# Patient Record
Sex: FEMALE | Race: WHITE | Employment: PART TIME
[De-identification: names, ages, dates, MRNs, and addresses within clinical notes are randomized per-mention and may not be internally consistent; named-entity substitution may affect disease eponyms.]

---

## 2017-01-03 ENCOUNTER — OFFICE VISIT (OUTPATIENT)
Dept: FAMILY MEDICINE CLINIC | Facility: CLINIC | Age: 32
End: 2017-01-03

## 2017-01-03 VITALS
HEIGHT: 64 IN | SYSTOLIC BLOOD PRESSURE: 126 MMHG | DIASTOLIC BLOOD PRESSURE: 68 MMHG | WEIGHT: 212 LBS | BODY MASS INDEX: 36.19 KG/M2 | HEART RATE: 114 BPM | TEMPERATURE: 97.9 F

## 2017-01-03 DIAGNOSIS — E11.42 DIABETIC POLYNEUROPATHY ASSOCIATED WITH TYPE 2 DIABETES MELLITUS (HCC): Primary | ICD-10-CM

## 2017-01-03 DIAGNOSIS — E78.5 HYPERLIPIDEMIA, UNSPECIFIED HYPERLIPIDEMIA TYPE: ICD-10-CM

## 2017-01-03 DIAGNOSIS — I10 ESSENTIAL HYPERTENSION: ICD-10-CM

## 2017-01-03 LAB
GLUCOSE BLD-MCNC: 167 MG/DL
HBA1C MFR BLD: 6.7 %

## 2017-01-03 PROCEDURE — 82962 GLUCOSE BLOOD TEST: CPT | Performed by: FAMILY MEDICINE

## 2017-01-03 PROCEDURE — 83036 HEMOGLOBIN GLYCOSYLATED A1C: CPT | Performed by: FAMILY MEDICINE

## 2017-01-03 PROCEDURE — 99214 OFFICE O/P EST MOD 30 MIN: CPT | Performed by: FAMILY MEDICINE

## 2017-01-03 ASSESSMENT — ENCOUNTER SYMPTOMS
ABDOMINAL PAIN: 0
SHORTNESS OF BREATH: 0
NAUSEA: 0
SORE THROAT: 0

## 2017-01-09 RX ORDER — INSULIN GLARGINE 100 [IU]/ML
INJECTION, SOLUTION SUBCUTANEOUS
Qty: 5 PEN | Refills: 1 | Status: SHIPPED | OUTPATIENT
Start: 2017-01-09 | End: 2017-02-06 | Stop reason: SDUPTHER

## 2017-01-09 RX ORDER — GLIMEPIRIDE 4 MG/1
4 TABLET ORAL 2 TIMES DAILY
Qty: 60 TABLET | Refills: 2 | Status: SHIPPED | OUTPATIENT
Start: 2017-01-09 | End: 2020-02-26 | Stop reason: SINTOL

## 2017-01-24 RX ORDER — CITALOPRAM 40 MG/1
40 TABLET ORAL DAILY
Qty: 30 TABLET | Refills: 2 | Status: SHIPPED | OUTPATIENT
Start: 2017-01-24 | End: 2020-02-26 | Stop reason: SDUPTHER

## 2017-02-03 ENCOUNTER — OFFICE VISIT (OUTPATIENT)
Dept: FAMILY MEDICINE CLINIC | Facility: CLINIC | Age: 32
End: 2017-02-03

## 2017-02-03 VITALS
BODY MASS INDEX: 36.02 KG/M2 | DIASTOLIC BLOOD PRESSURE: 60 MMHG | HEIGHT: 64 IN | WEIGHT: 211 LBS | SYSTOLIC BLOOD PRESSURE: 134 MMHG | TEMPERATURE: 98.2 F | HEART RATE: 110 BPM

## 2017-02-03 DIAGNOSIS — N39.0 RECURRENT UTI: Primary | ICD-10-CM

## 2017-02-03 DIAGNOSIS — I10 ESSENTIAL HYPERTENSION: ICD-10-CM

## 2017-02-03 DIAGNOSIS — E11.42 DIABETIC POLYNEUROPATHY ASSOCIATED WITH TYPE 2 DIABETES MELLITUS (HCC): ICD-10-CM

## 2017-02-03 LAB
BILIRUBIN, POC: ABNORMAL
BLOOD URINE, POC: ABNORMAL
CLARITY, POC: ABNORMAL
COLOR, POC: YELLOW
GLUCOSE URINE, POC: ABNORMAL
KETONES, POC: ABNORMAL
LEUKOCYTE EST, POC: ABNORMAL
NITRITE, POC: ABNORMAL
PH, POC: 5
PROTEIN, POC: ABNORMAL
SPECIFIC GRAVITY, POC: 1.03
UROBILINOGEN, POC: ABNORMAL

## 2017-02-03 PROCEDURE — 81002 URINALYSIS NONAUTO W/O SCOPE: CPT | Performed by: FAMILY MEDICINE

## 2017-02-03 PROCEDURE — 99213 OFFICE O/P EST LOW 20 MIN: CPT | Performed by: FAMILY MEDICINE

## 2017-02-03 RX ORDER — GLIMEPIRIDE 4 MG/1
4 TABLET ORAL 2 TIMES DAILY
Qty: 60 TABLET | Refills: 2 | Status: CANCELLED | OUTPATIENT
Start: 2017-02-03

## 2017-02-03 RX ORDER — CITALOPRAM 40 MG/1
40 TABLET ORAL DAILY
Qty: 30 TABLET | Refills: 2 | Status: CANCELLED | OUTPATIENT
Start: 2017-02-03

## 2017-02-03 RX ORDER — CIPROFLOXACIN 500 MG/1
500 TABLET, FILM COATED ORAL 2 TIMES DAILY
Qty: 14 TABLET | Refills: 0 | Status: SHIPPED | OUTPATIENT
Start: 2017-02-03 | End: 2017-02-10

## 2017-02-03 RX ORDER — LISINOPRIL 10 MG/1
10 TABLET ORAL DAILY
Qty: 30 TABLET | Refills: 2 | Status: CANCELLED | OUTPATIENT
Start: 2017-02-03

## 2017-02-03 RX ORDER — PREGABALIN 50 MG/1
50 CAPSULE ORAL DAILY
Qty: 30 CAPSULE | Refills: 1 | Status: CANCELLED | OUTPATIENT
Start: 2017-02-03

## 2017-02-03 RX ORDER — INSULIN GLARGINE 100 [IU]/ML
INJECTION, SOLUTION SUBCUTANEOUS
Qty: 5 PEN | Refills: 1 | Status: CANCELLED | OUTPATIENT
Start: 2017-02-03

## 2017-02-03 RX ORDER — PHENAZOPYRIDINE HYDROCHLORIDE 200 MG/1
200 TABLET, FILM COATED ORAL 3 TIMES DAILY
Qty: 9 TABLET | Refills: 0 | Status: SHIPPED | OUTPATIENT
Start: 2017-02-03 | End: 2017-02-06

## 2017-02-03 RX ORDER — LANCETS 30 GAUGE
EACH MISCELLANEOUS
Qty: 100 EACH | Refills: 2 | Status: CANCELLED | OUTPATIENT
Start: 2017-02-03

## 2017-02-03 RX ORDER — SIMVASTATIN 20 MG
20 TABLET ORAL EVERY EVENING
Qty: 30 TABLET | Refills: 2 | Status: CANCELLED | OUTPATIENT
Start: 2017-02-03

## 2017-02-03 ASSESSMENT — ENCOUNTER SYMPTOMS
SORE THROAT: 0
ABDOMINAL PAIN: 0
BACK PAIN: 1
SHORTNESS OF BREATH: 0
NAUSEA: 0

## 2017-02-03 ASSESSMENT — PATIENT HEALTH QUESTIONNAIRE - PHQ9
SUM OF ALL RESPONSES TO PHQ QUESTIONS 1-9: 0
2. FEELING DOWN, DEPRESSED OR HOPELESS: 0
SUM OF ALL RESPONSES TO PHQ9 QUESTIONS 1 & 2: 0
1. LITTLE INTEREST OR PLEASURE IN DOING THINGS: 0

## 2017-02-14 DIAGNOSIS — I10 ESSENTIAL HYPERTENSION: ICD-10-CM

## 2017-02-14 DIAGNOSIS — E11.42 DIABETIC POLYNEUROPATHY ASSOCIATED WITH TYPE 2 DIABETES MELLITUS (HCC): ICD-10-CM

## 2017-02-14 RX ORDER — SIMVASTATIN 20 MG
20 TABLET ORAL EVERY EVENING
Qty: 30 TABLET | Refills: 2 | Status: SHIPPED | OUTPATIENT
Start: 2017-02-14 | End: 2020-02-26 | Stop reason: ALTCHOICE

## 2017-02-14 RX ORDER — LISINOPRIL 10 MG/1
10 TABLET ORAL DAILY
Qty: 30 TABLET | Refills: 2 | Status: SHIPPED | OUTPATIENT
Start: 2017-02-14 | End: 2020-02-26

## 2017-02-24 RX ORDER — GLIMEPIRIDE 4 MG/1
4 TABLET ORAL 2 TIMES DAILY
Qty: 60 TABLET | Refills: 2 | Status: CANCELLED | OUTPATIENT
Start: 2017-02-24

## 2017-02-24 RX ORDER — PREGABALIN 50 MG/1
50 CAPSULE ORAL DAILY
Qty: 30 CAPSULE | Refills: 1 | OUTPATIENT
Start: 2017-02-24 | End: 2020-02-26 | Stop reason: ALTCHOICE

## 2017-03-02 ENCOUNTER — OFFICE VISIT (OUTPATIENT)
Dept: FAMILY MEDICINE CLINIC | Facility: CLINIC | Age: 32
End: 2017-03-02

## 2017-03-02 VITALS
TEMPERATURE: 97.9 F | HEART RATE: 98 BPM | BODY MASS INDEX: 35.34 KG/M2 | WEIGHT: 207 LBS | SYSTOLIC BLOOD PRESSURE: 126 MMHG | HEIGHT: 64 IN | DIASTOLIC BLOOD PRESSURE: 62 MMHG

## 2017-03-02 DIAGNOSIS — J32.1 FRONTAL SINUSITIS, UNSPECIFIED CHRONICITY: Primary | ICD-10-CM

## 2017-03-02 DIAGNOSIS — E11.42 DIABETIC POLYNEUROPATHY ASSOCIATED WITH TYPE 2 DIABETES MELLITUS (HCC): ICD-10-CM

## 2017-03-02 PROCEDURE — 99213 OFFICE O/P EST LOW 20 MIN: CPT | Performed by: FAMILY MEDICINE

## 2017-03-02 RX ORDER — SULFAMETHOXAZOLE AND TRIMETHOPRIM 800; 160 MG/1; MG/1
1 TABLET ORAL 2 TIMES DAILY
Qty: 20 TABLET | Refills: 0 | Status: SHIPPED | OUTPATIENT
Start: 2017-03-02 | End: 2017-03-12

## 2017-03-02 RX ORDER — FLUTICASONE PROPIONATE 50 MCG
2 SPRAY, SUSPENSION (ML) NASAL DAILY
Qty: 1 BOTTLE | Refills: 0 | Status: SHIPPED | OUTPATIENT
Start: 2017-03-02 | End: 2021-12-28

## 2017-03-02 ASSESSMENT — ENCOUNTER SYMPTOMS
SINUS PRESSURE: 1
COUGH: 1
SHORTNESS OF BREATH: 0
NAUSEA: 0
ABDOMINAL PAIN: 0
SORE THROAT: 0
WHEEZING: 1

## 2017-05-08 ENCOUNTER — TELEPHONE (OUTPATIENT)
Dept: FAMILY MEDICINE CLINIC | Age: 32
End: 2017-05-08

## 2018-09-23 ENCOUNTER — HOSPITAL ENCOUNTER (EMERGENCY)
Age: 33
Discharge: HOME | End: 2018-09-23
Attending: EMERGENCY MEDICINE

## 2018-09-23 VITALS
BODY MASS INDEX: 35.44 KG/M2 | TEMPERATURE: 97.5 F | WEIGHT: 200 LBS | HEART RATE: 100 BPM | OXYGEN SATURATION: 99 % | HEIGHT: 63 IN | DIASTOLIC BLOOD PRESSURE: 79 MMHG | SYSTOLIC BLOOD PRESSURE: 143 MMHG | RESPIRATION RATE: 18 BRPM

## 2018-09-23 DIAGNOSIS — K05.10 GINGIVITIS: ICD-10-CM

## 2018-09-23 DIAGNOSIS — K02.9 DENTAL DECAY: Primary | ICD-10-CM

## 2018-09-23 PROCEDURE — 99282 EMERGENCY DEPT VISIT SF MDM: CPT

## 2018-09-23 RX ORDER — CLINDAMYCIN HYDROCHLORIDE 150 MG/1
300 CAPSULE ORAL 4 TIMES DAILY
Qty: 56 CAPSULE | Refills: 0 | Status: SHIPPED | OUTPATIENT
Start: 2018-09-23 | End: 2018-09-30

## 2018-09-23 RX ORDER — CHLORHEXIDINE GLUCONATE 0.12 MG/ML
15 RINSE ORAL 2 TIMES DAILY
Qty: 420 ML | Refills: 0 | Status: SHIPPED | OUTPATIENT
Start: 2018-09-23 | End: 2018-10-07

## 2018-09-23 ASSESSMENT — PAIN DESCRIPTION - PAIN TYPE: TYPE: ACUTE PAIN

## 2018-09-23 ASSESSMENT — PAIN DESCRIPTION - LOCATION: LOCATION: TEETH

## 2018-09-23 ASSESSMENT — PAIN SCALES - GENERAL: PAINLEVEL_OUTOF10: 8

## 2018-09-23 NOTE — ED NOTES
C/o pain left lower jaw; left lower jacob + decay redness at gums slight swelling      Lorraine Ibrahim RN  09/23/18 [id number]

## 2018-09-24 NOTE — ED PROVIDER NOTES
[address] ED  eMERGENCY dEPARTMENT eNCOUnter   Independent Attestation     Pt Name: Binh Miranda  MRN: [id number]  [place] 1985  Date of evaluation: 9/23/18   Binh Miranda is a 26 y.o. female who presents with Dental Pain    Vitals:   Vitals:    09/23/18 0950   BP: (!) 143/79   Pulse: 100   Resp: 18   Temp: 97.5 °F (36.4 °C)   TempSrc: Oral   SpO2: 99%   Weight: 200 lb (90.7 kg)   Height: 5' 3\" (1.6 m)     Impression:   1. Dental decay    2. Gingivitis      I was personally available for consultation in the Emergency Department. I have reviewed the chart and agree with the documentation as recorded by the [facility], including the assessment, treatment plan and disposition.   Steve Lomax MD  Attending Emergency  Physician                  Steve Loamx MD  09/23/18 2012       Steve Lomax MD  10/20/18 [id number]

## 2019-07-26 ENCOUNTER — TELEPHONE (OUTPATIENT)
Dept: FAMILY MEDICINE CLINIC | Age: 34
End: 2019-07-26

## 2019-08-28 ENCOUNTER — TELEPHONE (OUTPATIENT)
Dept: FAMILY MEDICINE CLINIC | Age: 34
End: 2019-08-28

## 2019-09-02 ENCOUNTER — HOSPITAL ENCOUNTER (EMERGENCY)
Age: 34
Discharge: HOME | End: 2019-09-02
Attending: EMERGENCY MEDICINE

## 2019-09-02 VITALS
DIASTOLIC BLOOD PRESSURE: 89 MMHG | RESPIRATION RATE: 16 BRPM | SYSTOLIC BLOOD PRESSURE: 153 MMHG | TEMPERATURE: 98 F | HEART RATE: 94 BPM | OXYGEN SATURATION: 97 % | HEIGHT: 63 IN | BODY MASS INDEX: 35.44 KG/M2 | WEIGHT: 200 LBS

## 2019-09-02 DIAGNOSIS — R22.0 SWELLING OF RIGHT SIDE OF FACE: ICD-10-CM

## 2019-09-02 DIAGNOSIS — K08.89 PAIN, DENTAL: Primary | ICD-10-CM

## 2019-09-02 PROCEDURE — 99282 EMERGENCY DEPT VISIT SF MDM: CPT

## 2019-09-02 PROCEDURE — 6370000000 HC RX 637 (ALT 250 FOR IP): Performed by: PHYSICIAN ASSISTANT

## 2019-09-02 RX ORDER — CLINDAMYCIN HYDROCHLORIDE 150 MG/1
300 CAPSULE ORAL ONCE
Status: COMPLETED | OUTPATIENT
Start: 2019-09-02 | End: 2019-09-02

## 2019-09-02 RX ORDER — CLINDAMYCIN HYDROCHLORIDE 300 MG/1
300 CAPSULE ORAL 3 TIMES DAILY
Qty: 30 CAPSULE | Refills: 0 | Status: SHIPPED | OUTPATIENT
Start: 2019-09-02 | End: 2019-09-12

## 2019-09-02 RX ORDER — IBUPROFEN 800 MG/1
800 TABLET ORAL EVERY 8 HOURS PRN
Qty: 20 TABLET | Refills: 0 | Status: SHIPPED | OUTPATIENT
Start: 2019-09-02

## 2019-09-02 RX ORDER — IBUPROFEN 800 MG/1
800 TABLET ORAL ONCE
Status: COMPLETED | OUTPATIENT
Start: 2019-09-02 | End: 2019-09-02

## 2019-09-02 RX ADMIN — IBUPROFEN 800 MG: 800 TABLET ORAL at 21:01

## 2019-09-02 RX ADMIN — CLINDAMYCIN HYDROCHLORIDE 300 MG: 150 CAPSULE ORAL at 21:01

## 2019-09-02 ASSESSMENT — PAIN SCALES - GENERAL: PAINLEVEL_OUTOF10: 5

## 2019-09-03 NOTE — ED PROVIDER NOTES
eMERGENCY dEPARTMENT eNCOUnter   Independent Attestation     Pt Name: Harpal Covington  MRN: [id number]  [place] 1985  Date of evaluation: 9/3/19     Harpal Covington is a 37 y.o. female with CC: Dental Pain      Based on the medical record the care appears appropriate. I was personally available for consultation in the Emergency Department.     Jackie Tapia MD  Attending Emergency Physician                    Agustin Moore MD  09/03/19 [id number]       Agustin Moore MD  09/03/19 [id number]
Take 1 capsule by mouth 3 times daily for 10 days    IBUPROFEN (ADVIL;MOTRIN) 800 MG TABLET    Take 1 tablet by mouth every 8 hours as needed for Pain       (Please note that portions of this note were completed with a voice recognition program.  Efforts were made to edit the dictations but occasionally words are mis-transcribed.)    Julia Morris 124, PA-C  09/02/19 [id number]

## 2019-10-23 ENCOUNTER — TELEPHONE (OUTPATIENT)
Dept: FAMILY MEDICINE CLINIC | Age: 34
End: 2019-10-23

## 2019-11-08 ENCOUNTER — TELEPHONE (OUTPATIENT)
Dept: FAMILY MEDICINE CLINIC | Age: 34
End: 2019-11-08

## 2020-02-26 ENCOUNTER — OFFICE VISIT (OUTPATIENT)
Dept: FAMILY MEDICINE CLINIC | Age: 35
End: 2020-02-26
Payer: COMMERCIAL

## 2020-02-26 VITALS
HEART RATE: 93 BPM | BODY MASS INDEX: 33.73 KG/M2 | WEIGHT: 190.4 LBS | DIASTOLIC BLOOD PRESSURE: 84 MMHG | OXYGEN SATURATION: 98 % | SYSTOLIC BLOOD PRESSURE: 132 MMHG | HEIGHT: 63 IN

## 2020-02-26 PROBLEM — M25.472 LEFT ANKLE SWELLING: Status: ACTIVE | Noted: 2020-02-26

## 2020-02-26 LAB — HBA1C MFR BLD: 12.1 %

## 2020-02-26 PROCEDURE — 4004F PT TOBACCO SCREEN RCVD TLK: CPT | Performed by: FAMILY MEDICINE

## 2020-02-26 PROCEDURE — 99204 OFFICE O/P NEW MOD 45 MIN: CPT | Performed by: FAMILY MEDICINE

## 2020-02-26 PROCEDURE — G8427 DOCREV CUR MEDS BY ELIG CLIN: HCPCS | Performed by: FAMILY MEDICINE

## 2020-02-26 PROCEDURE — G8417 CALC BMI ABV UP PARAM F/U: HCPCS | Performed by: FAMILY MEDICINE

## 2020-02-26 PROCEDURE — 2022F DILAT RTA XM EVC RTNOPTHY: CPT | Performed by: FAMILY MEDICINE

## 2020-02-26 PROCEDURE — G8484 FLU IMMUNIZE NO ADMIN: HCPCS | Performed by: FAMILY MEDICINE

## 2020-02-26 PROCEDURE — 3046F HEMOGLOBIN A1C LEVEL >9.0%: CPT | Performed by: FAMILY MEDICINE

## 2020-02-26 PROCEDURE — 83036 HEMOGLOBIN GLYCOSYLATED A1C: CPT | Performed by: FAMILY MEDICINE

## 2020-02-26 RX ORDER — ATORVASTATIN CALCIUM 40 MG/1
40 TABLET, FILM COATED ORAL DAILY
Qty: 90 TABLET | Refills: 3 | Status: SHIPPED | OUTPATIENT
Start: 2020-02-26 | End: 2020-06-01 | Stop reason: SDUPTHER

## 2020-02-26 RX ORDER — LISINOPRIL 5 MG/1
5 TABLET ORAL DAILY
Qty: 90 TABLET | Refills: 1 | Status: SHIPPED | OUTPATIENT
Start: 2020-02-26 | End: 2020-09-16

## 2020-02-26 RX ORDER — CITALOPRAM 40 MG/1
40 TABLET ORAL DAILY
Qty: 30 TABLET | Refills: 2 | Status: SHIPPED | OUTPATIENT
Start: 2020-02-26 | End: 2020-04-22 | Stop reason: SDUPTHER

## 2020-02-26 RX ORDER — ASPIRIN 81 MG/1
81 TABLET ORAL DAILY
Qty: 90 TABLET | Refills: 1 | Status: SHIPPED | OUTPATIENT
Start: 2020-02-26 | End: 2020-09-16 | Stop reason: SDUPTHER

## 2020-02-26 SDOH — ECONOMIC STABILITY: FOOD INSECURITY: WITHIN THE PAST 12 MONTHS, YOU WORRIED THAT YOUR FOOD WOULD RUN OUT BEFORE YOU GOT MONEY TO BUY MORE.: NEVER TRUE

## 2020-02-26 SDOH — ECONOMIC STABILITY: TRANSPORTATION INSECURITY
IN THE PAST 12 MONTHS, HAS LACK OF TRANSPORTATION KEPT YOU FROM MEETINGS, WORK, OR FROM GETTING THINGS NEEDED FOR DAILY LIVING?: NO

## 2020-02-26 SDOH — ECONOMIC STABILITY: FOOD INSECURITY: WITHIN THE PAST 12 MONTHS, THE FOOD YOU BOUGHT JUST DIDN'T LAST AND YOU DIDN'T HAVE MONEY TO GET MORE.: NEVER TRUE

## 2020-02-26 SDOH — ECONOMIC STABILITY: INCOME INSECURITY: HOW HARD IS IT FOR YOU TO PAY FOR THE VERY BASICS LIKE FOOD, HOUSING, MEDICAL CARE, AND HEATING?: NOT HARD AT ALL

## 2020-02-26 SDOH — ECONOMIC STABILITY: TRANSPORTATION INSECURITY
IN THE PAST 12 MONTHS, HAS THE LACK OF TRANSPORTATION KEPT YOU FROM MEDICAL APPOINTMENTS OR FROM GETTING MEDICATIONS?: NO

## 2020-02-26 ASSESSMENT — ENCOUNTER SYMPTOMS
BLOOD IN STOOL: 0
SHORTNESS OF BREATH: 0
SINUS PAIN: 0
COUGH: 0
PHOTOPHOBIA: 0
CHEST TIGHTNESS: 0
ABDOMINAL DISTENTION: 0
RHINORRHEA: 0
SORE THROAT: 0
ABDOMINAL PAIN: 0
CONSTIPATION: 0
BACK PAIN: 0
VOMITING: 0
NAUSEA: 0
WHEEZING: 0
COLOR CHANGE: 1

## 2020-02-26 NOTE — PROGRESS NOTES
Visit Information    Have you changed or started any medications since your last visit including any over-the-counter medicines, vitamins, or herbal medicines? no   Are you having any side effects from any of your medications? -  no  Have you stopped taking any of your medications? Is so, why? -  no    Have you seen any other physician or provider since your last visit? No  Have you had any other diagnostic tests since your last visit? No  Have you been seen in the emergency room and/or had an admission to a hospital since we last saw you? Yes - Records Obtained  Have you had your routine dental cleaning in the past 6 months? no    Have you activated your [employer] account? If not, what are your barriers?  No:      Patient Care Team:  Kayla Fortune MD as PCP - General (Family Medicine)  Ryann De La O MD as PCP - [facility] Provider    Medical History Review  Past Medical, Family, and Social History reviewed and does contribute to the patient presenting condition    Health Maintenance   Topic Date Due    Varicella vaccine (1 of 2 - 2-dose childhood series) 02/07/1984    HIV screen  02/07/1998    Hepatitis B vaccine (1 of 3 - Risk 3-dose series) 02/07/2002    Diabetic retinal exam  06/10/2016    Lipid screen  04/05/2017    Potassium monitoring  04/05/2017    Creatinine monitoring  04/05/2017    Diabetic foot exam  07/05/2017    Diabetic microalbuminuria test  07/07/2017    A1C test (Diabetic or Prediabetic)  01/03/2018    Cervical cancer screen  06/16/2018    Flu vaccine (1) 09/01/2019    Pneumococcal 0-64 years Vaccine (1 of 1 - PPSV23) 02/04/2021 (Originally 2/7/1989)    DTaP/Tdap/Td vaccine (1 - Tdap) 02/26/2021 (Originally 2/7/1994)    Shingles Vaccine (1 of 2) 02/07/2033    Hepatitis A vaccine  Aged Out    Hib vaccine  Aged Out    Meningococcal (ACWY) vaccine  Aged Out

## 2020-02-26 NOTE — PROGRESS NOTES
Chief Complaint   Patient presents with    Established New Doctor    Hypertension    Hyperlipidemia    Diabetes         Salinas Blackwell  here today for follow up on chronic medical problems, go over labs and/or diagnostic studies, and medication refills. Established New Doctor; Hypertension; Hyperlipidemia; and Diabetes      HPI: Patient is here to  establish, has history of uncontrolled diabetes, was not on any medications since past 2 years. Patient reports she did not have any insurance and recently she went to ER with history of ankle injury and also she was not feeling well. Patient's blood sugars were running high more than 400. She was in [facility], was not in DKA. Patient was sent home with Lantus and Humalog. Patient reports her sugars were running high more than 300 and after she was taking Humalog before meals they were bumping to more than 400. Morning and evening sugars have slightly improved but are still more than 300. She increase her Lantus to 40 units twice a day by herself. Patient stopped Humalog. She has tried metformin in the past but she could not tolerate that. Hypertension fairly controlled on small dose of lisinopril. Hyperlipidemia no recent blood work. Patient reports she had a fall and twisted her left ankle, which is swollen, she had x-ray done in the ER that showed soft tissue swelling no fractures. Patient reports she still has swelling and pain but she is able to put weight on that. Swelling has mildly improved and bruise has also improved. She had mild range of motion. Patient reports she cannot by lancets and test strips, she is using her father's as he is getting free from [address] [facility]. She has history of depression was taking Celexa reports that was helping. She needs to restart on Celexa denies any bad thoughts, denies any suicide attempts.     /84   Pulse 93   Ht 5' 3\" (1.6 m)   Wt 190 lb 6.4 oz (86.4 kg)   LMP 02/07/2020 (Exact Date)   SpO2 98% Comment: resting @ RA  BMI 33.73 kg/m²    Body mass index is 33.73 kg/m². Wt Readings from Last 3 Encounters:   02/26/20 190 lb 6.4 oz (86.4 kg)   09/02/19 200 lb (90.7 kg)   09/23/18 200 lb (90.7 kg)        []Negative depression screening. PHQ Scores 2/3/2017   PHQ2 Score 0   PHQ9 Score 0      []1-4 = Minimal depression   []5-9 = Milddepression   []10-14 = Moderate depression   []15-19 = Moderately severe depression   []20-27 = Severe depression    Discussed testing with the patient and all questions fully answered. Hospital Outpatient Visit on 02/03/2017   Component Date Value Ref Range Status    Color, UA 02/03/2017 YELLOW  YEL Final    Turbidity UA 02/03/2017 TURBID* CLEAR Final    Glucose, Ur 02/03/2017 NEGATIVE  NEG Final    Bilirubin Urine 02/03/2017 NEGATIVE  NEG Final    Ketones, Urine 02/03/2017 TRACE* NEG Final    Specific Gravity, UA 02/03/2017 1.026  1.005 - 1.030 Final    Urine Hgb 02/03/2017 MODERATE* NEG Final    pH, UA 02/03/2017 5.0  5.0 - 8.0 Final    Protein, UA 02/03/2017 NEGATIVE  NEG Final    Urobilinogen, Urine 02/03/2017 Normal  NORM Final    Nitrite, Urine 02/03/2017 NEGATIVE  NEG Final    Leukocyte Esterase, Urine 02/03/2017 MODERATE* NEG Final    Urinalysis Comments 02/03/2017 Microscopic exam not performed based on chemical results unless requested in   Final    Comment:  original order.   [address] [place] [address] [address] [address] [address] [phone]           Most recent labs reviewed:     Lab Results   Component Value Date    WBC 6.9 10/28/2012    HGB 14.7 10/28/2012    HCT 43.1 10/28/2012    MCV 95.8 10/28/2012     (L) 10/28/2012       @BRIEFLAB(NA,K,CL,CO2,BUN,CREATININE,GLUCOSE,CALCIUM)@     Lab Results   Component Value Date    ALT 61 04/05/2016    AST 48 04/05/2016    ALKPHOS 121 04/05/2016    BILITOT 0.5 04/05/2016       No results found for: TSHFT4, TSH    Lab Results   Component Value Date    CHOL 150 04/05/2016    CHOL 231 06/04/2015     Lab Results   Component Value Date    TRIG 228 04/05/2016    TRIG 271 06/04/2015     Lab Results   Component Value Date    HDL 26 (A) 04/05/2016    HDL 27 (A) 06/04/2015     Lab Results   Component Value Date    LDLCALC 78 04/05/2016    LDLCALC 150 06/04/2015     Lab Results   Component Value Date    VLDL 46 04/05/2016    VLDL 54 06/04/2015     Lab Results   Component Value Date    CHOLHDLRATIO 5.8 04/05/2016    CHOLHDLRATIO 8.6 06/04/2015       Lab Results   Component Value Date    LABA1C 12.1 02/26/2020       No results found for: TNSJMCQD65    No results found for: FOLATE    No results found for: IRON, TIBC, FERRITIN    No results found for: VITD25          Current Outpatient Medications   Medication Sig Dispense Refill    citalopram (CELEXA) 40 MG tablet Take 1 tablet by mouth daily 30 tablet 2    insulin glargine (LANTUS SOLOSTAR) 100 UNIT/ML injection pen Inject 50 Units into the skin 2 times daily inject subcutaneously 25 units every morning then 35 units AT NIGHT 5 pen 3    SITagliptin (JANUVIA) 100 MG tablet Take 1 tablet by mouth daily 90 tablet 1    atorvastatin (LIPITOR) 40 MG tablet Take 1 tablet by mouth daily 90 tablet 3    aspirin EC 81 MG EC tablet Take 1 tablet by mouth daily 90 tablet 1    lisinopril (PRINIVIL;ZESTRIL) 5 MG tablet Take 1 tablet by mouth daily 90 tablet 1    Insulin Pen Needle 29G X 12.7MM MISC 1 each by Does not apply route daily To use with insulin 120 each 1    ibuprofen (ADVIL;MOTRIN) 800 MG tablet Take 1 tablet by mouth every 8 hours as needed for Pain (Patient not taking: Reported on 2/26/2020) 20 tablet 0    fluticasone (FLONASE) 50 MCG/ACT nasal spray 2 sprays by Nasal route daily (Patient not taking: Reported on 2/26/2020) 1 Bottle 0    Insulin Pen Needle 31G X 8 MM MISC 1 each by Does not apply route daily (Patient not taking: Reported on 2/26/2020) 100 each 1    B-D UF III MINI PEN NEEDLES 31G X 5 MM MISC Inject 1 each into the skin 2 times No  Counseling given: Yes        Family History   Problem Relation Age of Onset    Other Mother         non alcoholic cirrhorsis liver-stage 4,fatty liver    Diabetes Mother     High Blood Pressure Mother     High Cholesterol Mother     Depression Father     Anxiety Disorder Father     High Blood Pressure Father     High Cholesterol Father     Diabetes Father     Diabetes Paternal Grandmother              -rest of complaints with corresponding details per ROS    The patient's past medical, surgical, social, and family history as well as her current medications and allergies were reviewed as documented intoday's encounter. Review of Systems   Constitutional: Positive for activity change. Negative for appetite change, diaphoresis and unexpected weight change. HENT: Negative for congestion, ear pain, hearing loss, nosebleeds, rhinorrhea, sinus pain and sore throat. Eyes: Negative for photophobia and visual disturbance. Respiratory: Negative for cough, chest tightness, shortness of breath and wheezing. Cardiovascular: Negative for chest pain, palpitations and leg swelling. Gastrointestinal: Negative for abdominal distention, abdominal pain, blood in stool, constipation, nausea and vomiting. Endocrine: Positive for polyphagia and polyuria. Genitourinary: Negative for difficulty urinating, dysuria, flank pain, frequency, urgency and vaginal pain. Musculoskeletal: Positive for joint swelling. Negative for arthralgias, back pain, gait problem, myalgias and neck pain. Ankle pain   Skin: Positive for color change. Negative for wound. Neurological: Negative for speech difficulty, weakness, numbness and headaches. Psychiatric/Behavioral: Negative for agitation, decreased concentration, dysphoric mood and sleep disturbance. The patient is not nervous/anxious. Physical Exam  Musculoskeletal:      Left ankle: She exhibits decreased range of motion and swelling.  She exhibits no deformity and normal pulse. Tenderness. Lateral malleolus tenderness found. No medial malleolus and no proximal fibula tenderness found. Achilles tendon exhibits pain. Achilles tendon exhibits normal Pineda's test results. PHYSICAL EXAM:   VITALS:   Vitals:    02/26/20 1604   BP: 132/84   Pulse: 93   SpO2: 98%     GENERAL:  Patient is a well-developed, well-nourished female  in no acute distress, alert and oriented x3, appropriate and pleasant conversation. HEAD: Normocephalic, atraumatic. EYES: Pupils equal, round and reactive to light and accommodation, extraocular   movements intact. ENT: Moist mucous membranes. No erythema is noted. NECK: Supple. No masses. No lymphadenopathy. CARDIOVASCULAR: Regular rate and rhythm. PULMONARY: Lungs are clear to auscultation bilaterally. ABDOMEN: Soft, nontender, nondistended. Positive bowel sounds. NEUROLOGIC: Cranial nerves II through XII grossly intact. No focal deficits are noted. ASSESSMENT AND PLAN      1. Type 2 diabetes mellitus with diabetic polyneuropathy, with long-term current use of insulin (HCC)  Uncontrolled A1c is 12.9, increase insulin to 50 units twice a day, start on Januvia, keep checking blood sugars get log close follow-up in 6 weeks. Patient needs follow-up with dietitian. Refill diabetic education  - POCT glycosylated hemoglobin (Hb A1C)  - Microalbumin, Ur; Future  - HIV-1 And HIV-2 Antibodies; Future  - Varicella Zoster Antibody, IgG; Future  - Hepatitis B Surface Antibody; Future  - insulin glargine (LANTUS SOLOSTAR) 100 UNIT/ML injection pen; Inject 50 Units into the skin 2 times daily inject subcutaneously 25 units every morning then 35 units AT NIGHT  Dispense: 5 pen; Refill: 3  - SITagliptin (JANUVIA) 100 MG tablet; Take 1 tablet by mouth daily  Dispense: 90 tablet; Refill: 1  - aspirin EC 81 MG EC tablet; Take 1 tablet by mouth daily  Dispense: 90 tablet;  Refill: 1  - Insulin Pen Needle 29G X 12.7MM MISC; 1 each by Does not apply route daily To use with insulin  Dispense: 120 each; Refill: 1    2. Essential hypertension  Controlled start on low-dose lisinopril  - lisinopril (PRINIVIL;ZESTRIL) 5 MG tablet; Take 1 tablet by mouth daily  Dispense: 90 tablet; Refill: 1    3. Mixed hyperlipidemia  Start on Lipitor and aspirin  - Lipid, Fasting; Future  - TSH With Reflex Ft4; Future  - atorvastatin (LIPITOR) 40 MG tablet; Take 1 tablet by mouth daily  Dispense: 90 tablet; Refill: 3    4. Recurrent major depressive disorder, in partial remission ([facility] [address]  -Restart on Celexa. - citalopram (CELEXA) 40 MG tablet; Take 1 tablet by mouth daily  Dispense: 30 tablet; Refill: 2    5. Left ankle swelling  -Patient is able to put weight on feet, able to walk swelling has improved. Discussed monitor for now if it did not improve in 1 week we can repeat x-rays.       Orders Placed This Encounter   Procedures    Microalbumin, Ur     Standing Status:   Future     Standing Expiration Date:   2/25/2021    Lipid, Fasting     Standing Status:   Future     Standing Expiration Date:   2/26/2021    HIV-1 And HIV-2 Antibodies     Standing Status:   Future     Standing Expiration Date:   2/26/2021    Varicella Zoster Antibody, IgG     Standing Status:   Future     Standing Expiration Date:   2/26/2021    Hepatitis B Surface Antibody     Standing Status:   Future     Standing Expiration Date:   2/26/2021    TSH With Reflex Ft4     Standing Status:   Future     Standing Expiration Date:   2/26/2021    POCT glycosylated hemoglobin (Hb A1C)         Medications Discontinued During This Encounter   Medication Reason    metFORMIN (GLUCOPHAGE) 1000 MG tablet Side effects    glimepiride (AMARYL) 4 MG tablet Side effects    ibuprofen (ADVIL) 200 MG tablet Therapy completed    pregabalin (LYRICA) 50 MG capsule Therapy completed    simvastatin (ZOCOR) 20 MG tablet Therapy completed    citalopram (CELEXA) 40 MG tablet REORDER    insulin glargine (LANTUS SOLOSTAR) 100 UNIT/ML injection pen REORDER    lisinopril (PRINIVIL;ZESTRIL) 10 MG tablet        Sarah received counseling on the following healthy behaviors: nutrition, exercise and medication adherence  Reviewed prior labs and health maintenance  Continue current medications, diet and exercise. Discussed use, benefit, and side effects of prescribed medications. Barriers to medication compliance addressed. Patient given educational materials - see patient instructions  Was a self-tracking handout given in paper form or via [employer]? Yes    Requested Prescriptions     Signed Prescriptions Disp Refills    citalopram (CELEXA) 40 MG tablet 30 tablet 2     Sig: Take 1 tablet by mouth daily    insulin glargine (LANTUS SOLOSTAR) 100 UNIT/ML injection pen 5 pen 3     Sig: Inject 50 Units into the skin 2 times daily inject subcutaneously 25 units every morning then 35 units AT NIGHT    SITagliptin (JANUVIA) 100 MG tablet 90 tablet 1     Sig: Take 1 tablet by mouth daily    atorvastatin (LIPITOR) 40 MG tablet 90 tablet 3     Sig: Take 1 tablet by mouth daily    aspirin EC 81 MG EC tablet 90 tablet 1     Sig: Take 1 tablet by mouth daily    lisinopril (PRINIVIL;ZESTRIL) 5 MG tablet 90 tablet 1     Sig: Take 1 tablet by mouth daily    Insulin Pen Needle 29G X 12.7MM MISC 120 each 1     Si each by Does not apply route daily To use with insulin       All patient questions answered. Patient voiced understanding. Quality Measures    Body mass index is 33.73 kg/m². Elevated. Weight control planned discussed Healthy diet and regular exercise. BP: 132/84 Blood pressure is normal. Treatment plan consists of Weight Reduction, DASH Eating Plan, Dietary Sodium Restriction, Increased Physical Activity and No treatment change needed.     Lab Results   Component Value Date    LDLCALC 78 2016    (goal LDL reduction with dx if diabetes is 50% LDL reduction)      PHQ Scores 2/3/2017   PHQ2 Score 0   PHQ9 Score 0     Interpretation of Total Score Depression Severity: 1-4 = Minimal depression, 5-9 = Mild depression, 10-14 = Moderate depression, 15-19 = Moderately severe depression, 20-27 = Severe depression    The patient'spast medical, surgical, social, and family history as well as her   current medications and allergies were reviewed as documented in today's encounter. Medications, labs, diagnostic studies, consultations andfollow-up as documented in this encounter. Return in about 6 weeks (around 4/8/2020) for dm ,htn, hld. Patient wasseen with total face to face time of 45 minutes. More than 50% of this visit was counseling and education. Future Appointments   Date Time Provider Alex Carl   4/8/2020  4:00 PM Brandee Huang MD  negrita Whitaker     This note was completed by using the assistance of a speech-recognition program. However, inadvertent computerized transcription errors may be present. Althoughevery effort was made to ensure accuracy, no guarantees can be provided that every mistake has been identified and corrected by editing.   Electronically signed by Brandee Huang MD on 2/26/2020  9:44 PM

## 2020-03-16 ENCOUNTER — TELEPHONE (OUTPATIENT)
Dept: FAMILY MEDICINE CLINIC | Age: 35
End: 2020-03-16

## 2020-03-16 RX ORDER — INSULIN GLARGINE 100 [IU]/ML
50 INJECTION, SOLUTION SUBCUTANEOUS 2 TIMES DAILY
Qty: 5 PEN | Refills: 3 | Status: SHIPPED | OUTPATIENT
Start: 2020-03-16 | End: 2020-03-16 | Stop reason: SDUPTHER

## 2020-03-16 RX ORDER — INSULIN GLARGINE 100 [IU]/ML
50 INJECTION, SOLUTION SUBCUTANEOUS 2 TIMES DAILY
Qty: 5 PEN | Refills: 3 | Status: SHIPPED | OUTPATIENT
Start: 2020-03-16 | End: 2020-06-01 | Stop reason: SDUPTHER

## 2020-03-16 NOTE — TELEPHONE ENCOUNTER
Call from pharm insulin glargine (LANTUS SOLOSTAR) 100 UNIT/ML injection pen has 2 sets of directions

## 2020-03-31 ENCOUNTER — HOSPITAL ENCOUNTER (OUTPATIENT)
Age: 35
Discharge: HOME | End: 2020-03-31
Payer: COMMERCIAL

## 2020-03-31 LAB
CHOLESTEROL, FASTING: 240 MG/DL
CHOLESTEROL/HDL RATIO: 6.7
CREATININE URINE: 140.8 MG/DL (ref 28–217)
HBV SURFACE AB TITR SER: <3.5 MIU/ML
HDLC SERPL-MCNC: 36 MG/DL
HIV AG/AB: NONREACTIVE
LDL CHOLESTEROL: 171 MG/DL (ref 0–130)
MICROALBUMIN/CREAT 24H UR: <12 MG/L
MICROALBUMIN/CREAT UR-RTO: NORMAL MCG/MG CREAT
TRIGLYCERIDE, FASTING: 166 MG/DL
TSH SERPL DL<=0.05 MIU/L-ACNC: 0.74 MIU/L (ref 0.3–5)
VLDLC SERPL CALC-MCNC: ABNORMAL MG/DL (ref 1–30)

## 2020-03-31 PROCEDURE — 84443 ASSAY THYROID STIM HORMONE: CPT

## 2020-03-31 PROCEDURE — 86317 IMMUNOASSAY INFECTIOUS AGENT: CPT

## 2020-03-31 PROCEDURE — 80061 LIPID PANEL: CPT

## 2020-03-31 PROCEDURE — 82570 ASSAY OF URINE CREATININE: CPT

## 2020-03-31 PROCEDURE — 87389 HIV-1 AG W/HIV-1&-2 AB AG IA: CPT

## 2020-03-31 PROCEDURE — 82043 UR ALBUMIN QUANTITATIVE: CPT

## 2020-03-31 PROCEDURE — 36415 COLL VENOUS BLD VENIPUNCTURE: CPT

## 2020-03-31 PROCEDURE — 86787 VARICELLA-ZOSTER ANTIBODY: CPT

## 2020-04-01 LAB — VZV IGG SER QL IA: 3.55

## 2020-04-22 RX ORDER — CITALOPRAM 40 MG/1
40 TABLET ORAL DAILY
Qty: 30 TABLET | Refills: 2 | Status: SHIPPED | OUTPATIENT
Start: 2020-04-22 | End: 2020-09-16 | Stop reason: ALTCHOICE

## 2020-06-01 ENCOUNTER — TELEMEDICINE (OUTPATIENT)
Dept: FAMILY MEDICINE CLINIC | Age: 35
End: 2020-06-01
Payer: COMMERCIAL

## 2020-06-01 VITALS — WEIGHT: 192 LBS | BODY MASS INDEX: 32.78 KG/M2 | HEIGHT: 64 IN

## 2020-06-01 PROCEDURE — 3046F HEMOGLOBIN A1C LEVEL >9.0%: CPT | Performed by: FAMILY MEDICINE

## 2020-06-01 PROCEDURE — 99214 OFFICE O/P EST MOD 30 MIN: CPT | Performed by: FAMILY MEDICINE

## 2020-06-01 PROCEDURE — G8427 DOCREV CUR MEDS BY ELIG CLIN: HCPCS | Performed by: FAMILY MEDICINE

## 2020-06-01 PROCEDURE — 2022F DILAT RTA XM EVC RTNOPTHY: CPT | Performed by: FAMILY MEDICINE

## 2020-06-01 RX ORDER — INSULIN GLARGINE 100 [IU]/ML
50 INJECTION, SOLUTION SUBCUTANEOUS 2 TIMES DAILY
Qty: 5 PEN | Refills: 3 | Status: SHIPPED | OUTPATIENT
Start: 2020-06-01 | End: 2020-07-31 | Stop reason: SDUPTHER

## 2020-06-01 RX ORDER — ATORVASTATIN CALCIUM 40 MG/1
40 TABLET, FILM COATED ORAL DAILY
Qty: 90 TABLET | Refills: 3 | Status: SHIPPED | OUTPATIENT
Start: 2020-06-01 | End: 2021-06-17

## 2020-06-01 ASSESSMENT — ENCOUNTER SYMPTOMS
PHOTOPHOBIA: 0
CHEST TIGHTNESS: 0
RHINORRHEA: 0
ABDOMINAL DISTENTION: 0
ABDOMINAL PAIN: 0
CONSTIPATION: 0
VOMITING: 0
COLOR CHANGE: 0
SHORTNESS OF BREATH: 0
SINUS PRESSURE: 0
WHEEZING: 0
DIARRHEA: 0

## 2020-06-01 NOTE — PROGRESS NOTES
speech difficulty, weakness, light-headedness, numbness and headaches. Psychiatric/Behavioral: Negative for agitation, decreased concentration and sleep disturbance. The patient is not nervous/anxious. Prior to Visit Medications    Medication Sig Taking? Authorizing Provider   atorvastatin (LIPITOR) 40 MG tablet Take 1 tablet by mouth daily Yes Landen Bhatt MD   insulin glargine (LANTUS SOLOSTAR) 100 UNIT/ML injection pen Inject 50 Units into the skin 2 times daily Yes Landen Bhatt MD   citalopram (CELEXA) 40 MG tablet Take 1 tablet by mouth daily Yes Landen Bhatt MD   SITagliptin (JANUVIA) 100 MG tablet Take 1 tablet by mouth daily Yes Landen Bhatt MD   aspirin EC 81 MG EC tablet Take 1 tablet by mouth daily Yes Landen Bhatt MD   Insulin Pen Needle 29G X 12.7MM MISC 1 each by Does not apply route daily To use with insulin Yes Landen Bhatt MD   ibuprofen (ADVIL;MOTRIN) 800 MG tablet Take 1 tablet by mouth every 8 hours as needed for Pain Yes Danny Bobby PA-C   fluticasone (FLONASE) 50 MCG/ACT nasal spray 2 sprays by Nasal route daily Yes Fredo John MD   Insulin Pen Needle 31G X 8 MM MISC 1 each by Does not apply route daily Yes Fredo John MD   B-D UF III MINI PEN NEEDLES 31G X 5 MM MISC Inject 1 each into the skin 2 times daily Yes Fredo John MD   glucose blood VI test strips (ONE TOUCH ULTRA TEST) strip TEST twice a day Yes Fredo John MD   Lancets MISC One Touch. Patient is to test blood sugars twice a day. Yes Fredo John MD   lisinopril (PRINIVIL;ZESTRIL) 5 MG tablet Take 1 tablet by mouth daily  Patient not taking: Reported on 6/1/2020  Landen Bhatt MD       Social History     Tobacco Use    Smoking status: Current Every Day Smoker     Packs/day: 2.00     Years: 23.00     Pack years: 46.00     Types: Cigarettes    Smokeless tobacco: Never Used   Substance Use Topics    Alcohol use:  Yes     Alcohol/week: 0.0 standard drinks    Drug use: Not Currently Comment: previous IV heroin abuse; clean since 10/26/2012        Allergies   Allergen Reactions    Pcn [Penicillins] Hives    Reglan [Metoclopramide] Other (See Comments)     Agitated   ,   Past Medical History:   Diagnosis Date    Anxiety     Bipolar disorder ([facility]     disorder #2    Dental decay     Depression     History of heroin abuse ([facility]     previous IV heroin abuse; clean since 10/26/2012    Hyperlipidemia     Hypertension     Type II or unspecified type diabetes mellitus without mention of complication, not stated as uncontrolled    ,   Past Surgical History:   Procedure Laterality Date    OVARY REMOVAL Right     TONSILLECTOMY      TUBAL LIGATION     ,   Social History     Tobacco Use    Smoking status: Current Every Day Smoker     Packs/day: 2.00     Years: 23.00     Pack years: 46.00     Types: Cigarettes    Smokeless tobacco: Never Used   Substance Use Topics    Alcohol use:  Yes     Alcohol/week: 0.0 standard drinks    Drug use: Not Currently     Comment: previous IV heroin abuse; clean since 10/26/2012   ,   Family History   Problem Relation Age of Onset    Other Mother         non alcoholic cirrhorsis liver-stage 4,fatty liver    Diabetes Mother     High Blood Pressure Mother     High Cholesterol Mother     Depression Father     Anxiety Disorder Father     High Blood Pressure Father     High Cholesterol Father     Diabetes Father     Diabetes Paternal Grandmother    ,   Immunization History   Administered Date(s) Administered    Influenza, Quadv, IM, (6 mo and older Fluzone, Flulaval, Fluarix and 3 yrs and older Afluria) 10/03/2016   ,   Health Maintenance   Topic Date Due    Hepatitis B vaccine (1 of 3 - Risk 3-dose series) 02/07/2002    Diabetic retinal exam  06/10/2016    Potassium monitoring  04/05/2017    Creatinine monitoring  04/05/2017    Diabetic foot exam  07/05/2017    Cervical cancer screen  06/16/2018    A1C test (Diabetic or Prediabetic)  05/26/2020   

## 2020-06-02 ENCOUNTER — HOSPITAL ENCOUNTER (OUTPATIENT)
Age: 35
Discharge: HOME | End: 2020-06-02
Payer: COMMERCIAL

## 2020-06-02 LAB
-: ABNORMAL
ALBUMIN SERPL-MCNC: 4.4 G/DL (ref 3.5–5.2)
ALBUMIN/GLOBULIN RATIO: ABNORMAL (ref 1–2.5)
ALP BLD-CCNC: 153 U/L (ref 35–104)
ALT SERPL-CCNC: 18 U/L (ref 5–33)
AMORPHOUS: ABNORMAL
ANION GAP SERPL CALCULATED.3IONS-SCNC: 10 MMOL/L (ref 9–17)
AST SERPL-CCNC: 15 U/L
BACTERIA: ABNORMAL
BILIRUB SERPL-MCNC: 0.27 MG/DL (ref 0.3–1.2)
BILIRUBIN URINE: NEGATIVE
BUN BLDV-MCNC: 8 MG/DL (ref 6–20)
BUN/CREAT BLD: ABNORMAL (ref 9–20)
CALCIUM SERPL-MCNC: 8.9 MG/DL (ref 8.6–10.4)
CASTS UA: ABNORMAL /LPF
CHLORIDE BLD-SCNC: 104 MMOL/L (ref 98–107)
CO2: 24 MMOL/L (ref 20–31)
COLOR: ABNORMAL
COMMENT UA: ABNORMAL
CREAT SERPL-MCNC: 0.57 MG/DL (ref 0.5–0.9)
CRYSTALS, UA: ABNORMAL /HPF
EPITHELIAL CELLS UA: ABNORMAL /HPF
ESTIMATED AVERAGE GLUCOSE: 174 MG/DL
GFR AFRICAN AMERICAN: >60 ML/MIN
GFR NON-AFRICAN AMERICAN: >60 ML/MIN
GFR SERPL CREATININE-BSD FRML MDRD: ABNORMAL ML/MIN/{1.73_M2}
GFR SERPL CREATININE-BSD FRML MDRD: ABNORMAL ML/MIN/{1.73_M2}
GLUCOSE BLD-MCNC: 260 MG/DL (ref 70–99)
GLUCOSE URINE: ABNORMAL
HBA1C MFR BLD: 7.7 % (ref 4–6)
KETONES, URINE: ABNORMAL
LEUKOCYTE ESTERASE, URINE: ABNORMAL
MUCUS: ABNORMAL
NITRITE, URINE: NEGATIVE
OTHER OBSERVATIONS UA: ABNORMAL
PH UA: 6 (ref 5–8)
POTASSIUM SERPL-SCNC: 4.6 MMOL/L (ref 3.7–5.3)
PROTEIN UA: ABNORMAL
RBC UA: ABNORMAL /HPF
RENAL EPITHELIAL, UA: ABNORMAL /HPF
SODIUM BLD-SCNC: 138 MMOL/L (ref 135–144)
SPECIFIC GRAVITY UA: 1.03 (ref 1–1.03)
TOTAL PROTEIN: 7.8 G/DL (ref 6.4–8.3)
TRICHOMONAS: ABNORMAL
TURBIDITY: ABNORMAL
URINE HGB: ABNORMAL
UROBILINOGEN, URINE: NORMAL
WBC UA: ABNORMAL /HPF
YEAST: ABNORMAL

## 2020-06-02 PROCEDURE — 81001 URINALYSIS AUTO W/SCOPE: CPT

## 2020-06-02 PROCEDURE — 87088 URINE BACTERIA CULTURE: CPT

## 2020-06-02 PROCEDURE — 80053 COMPREHEN METABOLIC PANEL: CPT

## 2020-06-02 PROCEDURE — 36415 COLL VENOUS BLD VENIPUNCTURE: CPT

## 2020-06-02 PROCEDURE — 87186 SC STD MICRODIL/AGAR DIL: CPT

## 2020-06-02 PROCEDURE — 87086 URINE CULTURE/COLONY COUNT: CPT

## 2020-06-02 PROCEDURE — 83036 HEMOGLOBIN GLYCOSYLATED A1C: CPT

## 2020-06-02 RX ORDER — CIPROFLOXACIN 500 MG/1
500 TABLET, FILM COATED ORAL 2 TIMES DAILY
Qty: 20 TABLET | Refills: 0 | Status: SHIPPED | OUTPATIENT
Start: 2020-06-02 | End: 2020-06-12

## 2020-06-03 LAB
CULTURE: ABNORMAL
Lab: ABNORMAL
SPECIMEN DESCRIPTION: ABNORMAL

## 2020-08-01 RX ORDER — INSULIN GLARGINE 100 [IU]/ML
50 INJECTION, SOLUTION SUBCUTANEOUS 2 TIMES DAILY
Qty: 5 PEN | Refills: 3 | Status: SHIPPED | OUTPATIENT
Start: 2020-08-01 | End: 2020-12-15 | Stop reason: SDUPTHER

## 2020-09-16 ENCOUNTER — TELEMEDICINE (OUTPATIENT)
Dept: FAMILY MEDICINE CLINIC | Age: 35
End: 2020-09-16
Payer: COMMERCIAL

## 2020-09-16 PROCEDURE — 3051F HG A1C>EQUAL 7.0%<8.0%: CPT | Performed by: FAMILY MEDICINE

## 2020-09-16 PROCEDURE — G8427 DOCREV CUR MEDS BY ELIG CLIN: HCPCS | Performed by: FAMILY MEDICINE

## 2020-09-16 PROCEDURE — 99214 OFFICE O/P EST MOD 30 MIN: CPT | Performed by: FAMILY MEDICINE

## 2020-09-16 PROCEDURE — 2022F DILAT RTA XM EVC RTNOPTHY: CPT | Performed by: FAMILY MEDICINE

## 2020-09-16 RX ORDER — ASPIRIN 81 MG/1
81 TABLET ORAL DAILY
Qty: 90 TABLET | Refills: 1 | Status: SHIPPED | OUTPATIENT
Start: 2020-09-16 | End: 2022-02-22 | Stop reason: SDUPTHER

## 2020-09-16 RX ORDER — BUSPIRONE HYDROCHLORIDE 10 MG/1
10 TABLET ORAL 3 TIMES DAILY
Qty: 90 TABLET | Refills: 0 | Status: SHIPPED | OUTPATIENT
Start: 2020-09-16 | End: 2020-10-16 | Stop reason: SDUPTHER

## 2020-09-16 RX ORDER — CITALOPRAM 40 MG/1
40 TABLET ORAL DAILY
Qty: 30 TABLET | Refills: 2 | Status: SHIPPED | OUTPATIENT
Start: 2020-09-16 | End: 2020-12-28

## 2020-09-16 ASSESSMENT — ENCOUNTER SYMPTOMS
ABDOMINAL PAIN: 0
ABDOMINAL DISTENTION: 0
SINUS PRESSURE: 0
WHEEZING: 0
SHORTNESS OF BREATH: 0
PHOTOPHOBIA: 0
COUGH: 0
BACK PAIN: 0
NAUSEA: 0
CHEST TIGHTNESS: 0
RHINORRHEA: 0
CONSTIPATION: 0

## 2020-09-16 ASSESSMENT — ANXIETY QUESTIONNAIRES
4. TROUBLE RELAXING: 3-NEARLY EVERY DAY
2. NOT BEING ABLE TO STOP OR CONTROL WORRYING: 3-NEARLY EVERY DAY
5. BEING SO RESTLESS THAT IT IS HARD TO SIT STILL: 3-NEARLY EVERY DAY
GAD7 TOTAL SCORE: 19
6. BECOMING EASILY ANNOYED OR IRRITABLE: 3-NEARLY EVERY DAY
1. FEELING NERVOUS, ANXIOUS, OR ON EDGE: 3-NEARLY EVERY DAY
7. FEELING AFRAID AS IF SOMETHING AWFUL MIGHT HAPPEN: 1-SEVERAL DAYS
3. WORRYING TOO MUCH ABOUT DIFFERENT THINGS: 3-NEARLY EVERY DAY

## 2020-09-16 ASSESSMENT — PATIENT HEALTH QUESTIONNAIRE - PHQ9
SUM OF ALL RESPONSES TO PHQ QUESTIONS 1-9: 0
SUM OF ALL RESPONSES TO PHQ9 QUESTIONS 1 & 2: 0
1. LITTLE INTEREST OR PLEASURE IN DOING THINGS: 0
2. FEELING DOWN, DEPRESSED OR HOPELESS: 0
SUM OF ALL RESPONSES TO PHQ QUESTIONS 1-9: 0

## 2020-09-16 NOTE — PROGRESS NOTES
[address] [address]  Phone: [phone], Fax: [phone]    TELEHEALTH EVALUATION -- Audio/Visual (During XNNTO-89 public health emergency)    Patient ID verified by me prior to start of this visit    Corwin Chavez (:  1985) has requested an audio/video evaluation for the following concern(s):  Chief Complaint   Patient presents with    Diabetes    Anxiety      HPI:  Corwin Chavez is an established patient of. Patient has a history of . Patient is followed for diabetes A1c is improved to 7.7, reports sugars are running within range. She denies any hyperglycemic episodes or hypo-. She is on insulin and Januvia. Patient has stopped lisinopril as her blood pressure is running normal.   Hyperlipidemia on statins has stopped taking aspirin discussed with patient that is important to prevent cardiovascular disease. Patient has history of depression and is on Celexa reports she still gets anxiety on and off, she does not want to change Celexa and wants to try something else along with Celexa. She cannot take Effexor due to side effects. Review of Systems   Constitutional: Negative for activity change, appetite change, diaphoresis, fatigue and unexpected weight change. HENT: Negative for congestion, ear pain, nosebleeds, rhinorrhea and sinus pressure. Eyes: Negative for photophobia and visual disturbance. Respiratory: Negative for cough, chest tightness, shortness of breath and wheezing. Cardiovascular: Negative for chest pain, palpitations and leg swelling. Gastrointestinal: Negative for abdominal distention, abdominal pain, constipation and nausea. Endocrine: Negative for polyphagia and polyuria. Genitourinary: Negative for difficulty urinating, frequency, hematuria, urgency and vaginal pain. Musculoskeletal: Positive for arthralgias. Negative for back pain, gait problem and joint swelling.    Allergic/Immunologic: Positive for environmental allergies. Neurological: Negative for speech difficulty, weakness, light-headedness, numbness and headaches. Psychiatric/Behavioral: Positive for decreased concentration. Negative for agitation, dysphoric mood, sleep disturbance and suicidal ideas. The patient is nervous/anxious. The patient is not hyperactive.         Patient Active Problem List    Diagnosis Date Noted    Left ankle swelling 02/26/2020    Depression 04/07/2016    Diabetic polyneuropathy associated with type 2 [facility] mellitus [facility] 08/24/2015    Essential hypertension 08/24/2015    Hyperlipidemia 06/08/2015    Type 2 diabetes mellitus with diabetic polyneuropathy, with long-term current use of insulin ([facility] 06/01/2015        Past Medical History:   Diagnosis Date    Anxiety     Bipolar disorder ([facility]     disorder #2    Dental decay     Depression     History of heroin abuse ([facility]     previous IV heroin abuse; clean since 10/26/2012    Hyperlipidemia     Hypertension     Type II or unspecified type diabetes mellitus without mention of complication, not stated as uncontrolled      Past Surgical History:   Procedure Laterality Date    OVARY REMOVAL Right     TONSILLECTOMY      TUBAL LIGATION       Family History   Problem Relation Age of Onset    Other Mother         non alcoholic cirrhorsis liver-stage 4,fatty liver    Diabetes Mother     High Blood Pressure Mother     High Cholesterol Mother     Depression Father     Anxiety Disorder Father     High Blood Pressure Father     High Cholesterol Father     Diabetes Father     Diabetes Paternal Grandmother      Current Outpatient Medications   Medication Sig Dispense Refill    aspirin EC 81 MG EC tablet Take 1 tablet by mouth daily 90 tablet 1    citalopram (CELEXA) 40 MG tablet Take 1 tablet by mouth daily 30 tablet 2    busPIRone (BUSPAR) 10 MG tablet Take 1 tablet by mouth 3 times daily 90 tablet 0    SITagliptin (JANUVIA) 100 MG tablet Take 1 tablet by mouth daily 90 tablet 1    insulin glargine (LANTUS SOLOSTAR) 100 UNIT/ML injection pen Inject 50 Units into the skin 2 times daily 5 pen 3    atorvastatin (LIPITOR) 40 MG tablet Take 1 tablet by mouth daily 90 tablet 3    Insulin Pen Needle 29G X 12.7MM MISC 1 each by Does not apply route daily To use with insulin 120 each 1    ibuprofen (ADVIL;MOTRIN) 800 MG tablet Take 1 tablet by mouth every 8 hours as needed for Pain 20 tablet 0    Insulin Pen Needle 31G X 8 MM MISC 1 each by Does not apply route daily 100 each 1    B-D UF III MINI PEN NEEDLES 31G X 5 MM MISC Inject 1 each into the skin 2 times daily 100 each 1    glucose blood VI test strips (ONE TOUCH ULTRA TEST) strip TEST twice a day 100 strip 2    Lancets MISC One Touch. Patient is to test blood sugars twice a day. 100 each 2    fluticasone (FLONASE) 50 MCG/ACT nasal spray 2 sprays by Nasal route daily (Patient not taking: Reported on 9/16/2020) 1 Bottle 0     No current facility-administered medications for this visit. Allergies   Allergen Reactions    Pcn [Penicillins] Hives    Reglan [Metoclopramide] Other (See Comments)     Agitated        Prior to Visit Medications    Medication Sig Taking?  Authorizing Provider   aspirin EC 81 MG EC tablet Take 1 tablet by mouth daily Yes Roddie Gottron, MD   citalopram (CELEXA) 40 MG tablet Take 1 tablet by mouth daily Yes Roddie Gottron, MD   busPIRone (BUSPAR) 10 MG tablet Take 1 tablet by mouth 3 times daily Yes Roddie Gottron, MD   SITagliptin (JANUVIA) 100 MG tablet Take 1 tablet by mouth daily Yes Roddie Gottron, MD   insulin glargine (LANTUS SOLOSTAR) 100 UNIT/ML injection pen Inject 50 Units into the skin 2 times daily Yes Roddie Gottron, MD   atorvastatin (LIPITOR) 40 MG tablet Take 1 tablet by mouth daily Yes Roddie Gottron, MD   Insulin Pen Needle 29G X 12.7MM MISC 1 each by Does not apply route daily To use with insulin Yes Roddie Gottron, MD   ibuprofen (ADVIL;MOTRIN) 800 MG tablet Take 1 tablet by mouth every 8 hours as needed for Pain Yes Esperanza Jorgensen PA-C   Insulin Pen Needle 31G X 8 MM MISC 1 each by Does not apply route daily Yes MD SUZANNE Yip UF III MINI PEN NEEDLES 31G X 5 MM MISC Inject 1 each into the skin 2 times daily Yes Stefany Damico MD   glucose blood VI test strips (ONE TOUCH ULTRA TEST) strip TEST twice a day Yes Stefany Damico MD   Lancets MISC One Touch. Patient is to test blood sugars twice a day. Yes Stefany Damico MD   fluticasone Sandip Danraza) 50 MCG/ACT nasal spray 2 sprays by Nasal route daily  Patient not taking: Reported on 9/16/2020  Stefany Damico MD       Social History     Tobacco Use    Smoking status: Current Every Day Smoker     Packs/day: 2.00     Years: 23.00     Pack years: 46.00     Types: Cigarettes    Smokeless tobacco: Never Used   Substance Use Topics    Alcohol use: Yes     Alcohol/week: 0.0 standard drinks    Drug use: Not Currently     Comment: previous IV heroin abuse; clean since 10/26/2012        PHYSICAL EXAMINATION:  Vital Signs: (As obtained by patient/caregiver or practitioner observation)    Constitutional: [x] Appears well-developed and well-nourished [x] No apparent distress      [] Abnormal-   Mental status  [x] Alert and awake  [x] Oriented to person/place/time [x]Able to follow commands      Eyes:  EOM    [x]  Normal  [] Abnormal-  Sclera  [x]  Normal  [] Abnormal -         Discharge [x]  None visible  [] Abnormal -    HENT:   [x] Normocephalic, atraumatic.   [] Abnormal   [x] Mouth/Throat: Mucous membranes are moist.     External Ears [x] Normal  [] Abnormal-     Neck: [x] No visualized mass     Pulmonary/Chest: [x] Respiratory effort normal.  [x] No visualized signs of difficulty breathing or respiratory distress        [] Abnormal     Musculoskeletal:   [x] Normal gait with no signs of ataxia         [x] Normal range of motion of neck        [] Abnormal-     Neurological:        [x] No Facial Asymmetry (Cranial nerve 7 motor function) (limited exam to video visit)          [x] No gaze palsy        [] Abnormal-     Skin:        [x] No significant exanthematous lesions or discoloration noted on facial skin         [] Abnormal-     Psychiatric:       [x] Normal Affect [x] No Hallucinations        [x] Abnormal- Anxious, with pressured speech    Other pertinent observable physical exam findings-   Lab Results   Component Value Date    WBC 6.9 10/28/2012    HGB 14.7 10/28/2012    HCT 43.1 10/28/2012    MCV 95.8 10/28/2012     (L) 10/28/2012     Lab Results   Component Value Date     06/02/2020    K 4.6 06/02/2020     06/02/2020    CO2 24 06/02/2020    BUN 8 06/02/2020    CREATININE 0.57 06/02/2020    GLUCOSE 260 06/02/2020    CALCIUM 8.9 06/02/2020      Lab Results   Component Value Date    ALT 18 06/02/2020    AST 15 06/02/2020    ALKPHOS 153 (H) 06/02/2020    BILITOT 0.27 (L) 06/02/2020     Lab Results   Component Value Date    TSH 0.74 03/31/2020     Lab Results   Component Value Date    CHOL 150 04/05/2016    CHOL 231 06/04/2015     Lab Results   Component Value Date    TRIG 228 04/05/2016    TRIG 271 06/04/2015     Lab Results   Component Value Date    HDL 36 (L) 03/31/2020    HDL 26 (A) 04/05/2016    HDL 27 (A) 06/04/2015     Lab Results   Component Value Date    LDLCALC 78 04/05/2016    LDLCALC 150 06/04/2015    LDLCHOLESTEROL 171 (H) 03/31/2020     Lab Results   Component Value Date    VLDL NOT REPORTED (H) 03/31/2020    VLDL 46 04/05/2016    VLDL 54 06/04/2015     Lab Results   Component Value Date    CHOLHDLRATIO 6.7 (H) 03/31/2020    CHOLHDLRATIO 5.8 04/05/2016    CHOLHDLRATIO 8.6 06/04/2015     Lab Results   Component Value Date    LABA1C 7.7 (H) 06/02/2020     No results found for: SOOCDAXH98  No results found for: FOLATE  No results found for: IRON, TIBC, FERRITIN  No results found for: VITD25   Due to this being a TeleHealth encounter, evaluation of the following organ systems is therapy    aspirin EC 81 MG EC tablet REORDER    lisinopril (PRINIVIL;ZESTRIL) 5 MG tablet Patient Choice      Waldo Vail received counseling on the following healthy behaviors: nutrition, exercise, medication adherence and tobacco cessation  Reviewed prior labs and health maintenance. Continue current medications, diet and exercise. Discussed use, benefit, and side effects of prescribed medications. Barriers to medication compliance addressed. Patient given educational materials - see patient instructions. All patient questions answered. Patient voiced understanding. Return in about 2 months (around 11/16/2020) for pap 30min. Robi David is a 38 y.o. female patient  being evaluated by a Virtual Visit (video visit) encounter to address concerns as mentioned above. A caregiver was present when appropriate. Due to this being a TeleHealth encounter (During [facility]- public health emergency), evaluation of the following organ systems was limited:Vitals/Constitutional/EENT/Resp/CV/GI//MS/Neuro/Skin/Heme-Lymph-Imm. Services were provided through a video synchronous discussion virtually to substitute for in-person clinic visit. This is a telehealth visit that was performed with the originating site at Patient Location: home and provider Location of [place], [place]. Verbal consent to participate in video visit was obtained. Patient ID verified by me prior to start of this visit  I discussed with the patient the nature of our telehealth visits via interactive/real-time audio/video that:  - I would evaluate the patient and recommend diagnostics and treatments based on my assessment  - Our sessions are not being recorded and that personal health information is protected  - Our team would provide follow up care in person if/when the patient needs it.      Pursuant to the emergency declaration under the [facility], [address] and Response Supplemental Appropriations Act, this Virtual Visit was conducted with patient's (and/or legal guardian's) consent, to reduce the patient's risk of exposure to COVID-19 and provide necessary medical care. The patient (and/or legal guardian) has also been advised to contact this office for worsening conditions or problems, and seek emergency medical treatment and/or call 911 if deemed necessary. This note was completed by using the assistance of a speech-recognition program. However, inadvertent computerized transcription errors may be present. Although every effort was made to ensure accuracy, no guarantees can be provided that every mistake has been identified and corrected by editing.   Electronically signed by Won Alaniz MD on 9/16/20 at 2:37 PM EDT

## 2020-10-16 RX ORDER — BUSPIRONE HYDROCHLORIDE 10 MG/1
10 TABLET ORAL 3 TIMES DAILY
Qty: 90 TABLET | Refills: 3 | Status: SHIPPED | OUTPATIENT
Start: 2020-10-16 | End: 2020-11-15

## 2020-12-15 ENCOUNTER — TELEMEDICINE (OUTPATIENT)
Dept: FAMILY MEDICINE CLINIC | Age: 35
End: 2020-12-15
Payer: COMMERCIAL

## 2020-12-15 PROBLEM — J01.90 ACUTE BACTERIAL SINUSITIS: Status: ACTIVE | Noted: 2020-12-15

## 2020-12-15 PROBLEM — Z20.822 SUSPECTED COVID-19 VIRUS INFECTION: Status: ACTIVE | Noted: 2020-12-15

## 2020-12-15 PROBLEM — B96.89 ACUTE BACTERIAL SINUSITIS: Status: ACTIVE | Noted: 2020-12-15

## 2020-12-15 PROCEDURE — G8427 DOCREV CUR MEDS BY ELIG CLIN: HCPCS | Performed by: FAMILY MEDICINE

## 2020-12-15 PROCEDURE — 3051F HG A1C>EQUAL 7.0%<8.0%: CPT | Performed by: FAMILY MEDICINE

## 2020-12-15 PROCEDURE — 2022F DILAT RTA XM EVC RTNOPTHY: CPT | Performed by: FAMILY MEDICINE

## 2020-12-15 PROCEDURE — 99214 OFFICE O/P EST MOD 30 MIN: CPT | Performed by: FAMILY MEDICINE

## 2020-12-15 RX ORDER — INSULIN GLARGINE 100 [IU]/ML
50 INJECTION, SOLUTION SUBCUTANEOUS 2 TIMES DAILY
Qty: 5 PEN | Refills: 3 | Status: SHIPPED | OUTPATIENT
Start: 2020-12-15 | End: 2021-02-15 | Stop reason: SDUPTHER

## 2020-12-15 ASSESSMENT — ENCOUNTER SYMPTOMS
COLOR CHANGE: 0
RHINORRHEA: 1
ABDOMINAL DISTENTION: 0
NAUSEA: 0
BLOOD IN STOOL: 0
TROUBLE SWALLOWING: 0
DIARRHEA: 0
PHOTOPHOBIA: 0
ANAL BLEEDING: 0
FACIAL SWELLING: 0
SINUS PRESSURE: 1
SHORTNESS OF BREATH: 0
CONSTIPATION: 0
SORE THROAT: 0
SINUS PAIN: 1
CHEST TIGHTNESS: 0
WHEEZING: 0
COUGH: 1
ABDOMINAL PAIN: 0
VOMITING: 0
BACK PAIN: 0

## 2020-12-15 NOTE — PROGRESS NOTES
Gastrointestinal: Negative for abdominal distention, abdominal pain, anal bleeding, blood in stool, constipation, diarrhea, nausea and vomiting. Endocrine: Negative for polyuria. Genitourinary: Negative for difficulty urinating, dysuria, flank pain, frequency, menstrual problem, urgency, vaginal discharge and vaginal pain. Musculoskeletal: Negative for arthralgias, back pain, gait problem, joint swelling, myalgias, neck pain and neck stiffness. Skin: Negative for color change and rash. Allergic/Immunologic: Negative for immunocompromised state. Neurological: Positive for headaches. Negative for dizziness, seizures, syncope, facial asymmetry, speech difficulty, weakness, light-headedness and numbness. Hematological: Negative for adenopathy. Psychiatric/Behavioral: Negative for agitation, behavioral problems, decreased concentration, dysphoric mood, sleep disturbance and suicidal ideas. The patient is not nervous/anxious and is not hyperactive.         Patient Active Problem List    Diagnosis Date Noted    Suspected COVID-19 virus infection 12/15/2020    Acute bacterial sinusitis 12/15/2020    Left ankle swelling 02/26/2020    Depression 04/07/2016    Diabetic polyneuropathy associated with type 2 diabetes mellitus ([facility] 08/24/2015    Essential hypertension 08/24/2015    Hyperlipidemia 06/08/2015    Type 2 diabetes mellitus with diabetic polyneuropathy, with long-term current use of insulin ([facility] 06/01/2015        Past Surgical History:   Procedure Laterality Date    OVARY REMOVAL Right     TONSILLECTOMY      TUBAL LIGATION       Family History   Problem Relation Age of Onset    Other Mother         non alcoholic cirrhorsis liver-stage 4,fatty liver    Diabetes Mother     High Blood Pressure Mother     High Cholesterol Mother     Depression Father     Anxiety Disorder Father     High Blood Pressure Father     High Cholesterol Father     Diabetes Father Patient-Reported Height 5 3        Constitutional: [x] Appears well-developed and well-nourished [x] No apparent distress      [] Abnormal-   Mental status  [x] Alert and awake  [x] Oriented to person/place/time [x]Able to follow commands      Eyes:  EOM    [x]  Normal  [] Abnormal-  Sclera  [x]  Normal  [] Abnormal -         Discharge [x]  None visible  [] Abnormal -    HENT:   [x] Normocephalic, atraumatic. [] Abnormal   [x] Mouth/Throat: Mucous membranes are moist.     External Ears [x] Normal  [] Abnormal-     Neck: [x] No visualized mass     Pulmonary/Chest: [x] Respiratory effort normal.  [x] No visualized signs of difficulty breathing or respiratory distress        [] Abnormal     Musculoskeletal:   [x] Normal gait with no signs of ataxia         [x] Normal range of motion of neck        [] Abnormal-     Neurological:        [x] No Facial Asymmetry (Cranial nerve 7 motor function) (limited exam to video visit)          [x] No gaze palsy        [] Abnormal-     Skin:        [x] No significant exanthematous lesions or discoloration noted on facial skin         [] Abnormal-     Psychiatric:       [x] Normal Affect [x] No Hallucinations        [x] Abnormal- Anxious, with pressured speech    Other pertinent observable physical exam findings-   Lab Results   Component Value Date    WBC 6.9 10/28/2012    HGB 14.7 10/28/2012    HCT 43.1 10/28/2012    MCV 95.8 10/28/2012     (L) 10/28/2012     Lab Results   Component Value Date     06/02/2020    K 4.6 06/02/2020     06/02/2020    CO2 24 06/02/2020    BUN 8 06/02/2020    CREATININE 0.57 06/02/2020    GLUCOSE 260 06/02/2020    CALCIUM 8.9 06/02/2020        Due to this being a TeleHealth encounter, evaluation of the following organ systems is limited: Vitals/Constitutional/EENT/Resp/CV/GI//MS/Neuro/Skin/Heme-Lymph-Imm. ASSESSMENT/PLAN:  1.  Suspected COVID-19 virus infection Order Specific Question:   Admitted to ICU for COVID-19? Answer:   No     Order Specific Question:   Employed in healthcare setting? Answer:   No     Order Specific Question:   Resident in a congregate (group) care setting? Answer:   No     Order Specific Question:   Pregnant? Answer:   No     Order Specific Question:   Previously tested for COVID-19? Answer:   No      Orders Placed This Encounter   Medications    insulin glargine (LANTUS SOLOSTAR) 100 UNIT/ML injection pen     Sig: Inject 50 Units into the skin 2 times daily     Dispense:  5 pen     Refill:  3    Insulin Pen Needle 31G X 8 MM MISC     Si each by Does not apply route daily     Dispense:  100 each     Refill:  1      Medications Discontinued During This Encounter   Medication Reason    Insulin Pen Needle 31G X 8 MM MISC REORDER    insulin glargine (LANTUS SOLOSTAR) 100 UNIT/ML injection pen REORDER      Sarah received counseling on the following healthy behaviors: nutrition, exercise and medication adherence  Reviewed prior labs and health maintenance. Continue current medications, diet and exercise. Discussed use, benefit, and side effects of prescribed medications. Barriers to medication compliance addressed. Patient given educational materials - see patient instructions. All patient questions answered. Patient voiced understanding. No follow-ups on file. Kendal Cardona is a 38 y.o. female patient  being evaluated by a Virtual Visit (video visit) encounter to address concerns as mentioned above. A caregiver was present when appropriate. Due to this being a TeleHealth encounter (During EDHZN-68 public [facility] emergency), evaluation of the following organ systems was limited:Vitals/Constitutional/EENT/Resp/CV/GI//MS/Neuro/Skin/Heme-Lymph-Imm. Services were provided through a video synchronous discussion virtually to substitute for in-person clinic visit. This is a telehealth visit that was performed with the originating site at Patient Location: home and provider Location of [place], [place]. Verbal consent to participate in video visit was obtained. Patient ID verified by me prior to start of this visit  I discussed with the patient the nature of our telehealth visits via interactive/real-time audio/video that:  - I would evaluate the patient and recommend diagnostics and treatments based on my assessment  - Our sessions are not being recorded and that personal health information is protected  - Our team would provide follow up care in person if/when the patient needs it. Pursuant to the emergency declaration under the [facility], [address] [place] [address] [facility] authority and the [employer] Act, this Virtual Visit was conducted with patient's (and/or legal guardian's) consent, to reduce the patient's risk of exposure to COVID-19 and provide necessary medical care. The patient (and/or legal guardian) has also been advised to contact this office for worsening conditions or problems, and seek emergency medical treatment and/or call 911 if deemed necessary. This note was completed by using the assistance of a speech-recognition program. However, inadvertent computerized transcription errors may be present. Although every effort was made to ensure accuracy, no guarantees can be provided that every mistake has been identified and corrected by editing.   Electronically signed by Karey Douglas MD on 12/15/20 at 9:50 AM EST

## 2020-12-15 NOTE — PROGRESS NOTES
Visit Information    Have you changed or started any medications since your last visit including any over-the-counter medicines, vitamins, or herbal medicines? no   Are you having any side effects from any of your medications? -  no  Have you stopped taking any of your medications? Is so, why? -  no    Have you seen any other physician or provider since your last visit? No  Have you had any other diagnostic tests since your last visit? No  Have you been seen in the emergency room and/or had an admission to a hospital since we last saw you? No  Have you had your routine dental cleaning in the past 6 months? no    Have you activated your [employer] account? If not, what are your barriers?  Yes     Patient Care Team:  Minh Simmons MD as PCP - General (Family Medicine)  Minh Simmons MD as PCP - [facility]    Medical History Review  Past Medical, Family, and Social History reviewed and does contribute to the patient presenting condition    Health Maintenance   Topic Date Due    Hepatitis B vaccine (1 of 3 - Risk 3-dose series) 02/07/2002    Diabetic retinal exam  06/10/2016    Diabetic foot exam  07/05/2017    Cervical cancer screen  06/16/2018    Flu vaccine (1) 09/01/2020    Pneumococcal 0-64 years Vaccine (1 of 1 - PPSV23) 02/04/2021 (Originally 2/7/1989)    DTaP/Tdap/Td vaccine (1 - Tdap) 02/26/2021 (Originally 2/7/2002)    Diabetic microalbuminuria test  03/31/2021    Lipid screen  03/31/2021    A1C test (Diabetic or Prediabetic)  06/02/2021    Potassium monitoring  06/02/2021    Creatinine monitoring  06/02/2021    HIV screen  Completed    Hepatitis A vaccine  Aged Out    Hib vaccine  Aged Out    Meningococcal (ACWY) vaccine  Aged Out    Varicella vaccine  Discontinued

## 2020-12-16 ENCOUNTER — HOSPITAL ENCOUNTER (OUTPATIENT)
Age: 35
Setting detail: SPECIMEN
Discharge: HOME | End: 2020-12-16
Payer: COMMERCIAL

## 2020-12-16 ENCOUNTER — NURSE ONLY (OUTPATIENT)
Dept: PRIMARY CARE CLINIC | Age: 35
End: 2020-12-16

## 2020-12-18 LAB — SARS-COV-2, NAA: NOT DETECTED

## 2020-12-20 ENCOUNTER — TELEPHONE (OUTPATIENT)
Dept: FAMILY MEDICINE CLINIC | Age: 35
End: 2020-12-20

## 2020-12-27 NOTE — TELEPHONE ENCOUNTER
Please Approve or Refuse.   Send to Pharmacy per Pt's Request:      Next Visit Date:  1/27/2021   Last Visit Date: 12/15/2020    Hemoglobin A1C (%)   Date Value   06/02/2020 7.7 (H)   02/26/2020 12.1   01/03/2017 6.7             ( goal A1C is < 7)   BP Readings from Last 3 Encounters:   02/26/20 132/84   09/02/19 (!) 153/89   09/23/18 (!) 143/79          (goal 120/80)  BUN   Date Value Ref Range Status   06/02/2020 8 6 - 20 mg/dL Final     CREATININE   Date Value Ref Range Status   06/02/2020 0.57 0.50 - 0.90 mg/dL Final     Potassium   Date Value Ref Range Status   06/02/2020 4.6 3.7 - 5.3 mmol/L Final

## 2020-12-28 RX ORDER — CITALOPRAM 40 MG/1
TABLET ORAL
Qty: 30 TABLET | Refills: 2 | Status: SHIPPED | OUTPATIENT
Start: 2020-12-28 | End: 2021-03-30

## 2021-01-18 ENCOUNTER — HOSPITAL ENCOUNTER (OUTPATIENT)
Age: 36
Discharge: HOME | End: 2021-01-18
Payer: COMMERCIAL

## 2021-01-18 DIAGNOSIS — E78.2 MIXED HYPERLIPIDEMIA: ICD-10-CM

## 2021-01-18 DIAGNOSIS — Z79.4 TYPE 2 DIABETES MELLITUS WITH DIABETIC POLYNEUROPATHY, WITH LONG-TERM CURRENT USE OF INSULIN (HCC): ICD-10-CM

## 2021-01-18 DIAGNOSIS — E11.42 TYPE 2 DIABETES MELLITUS WITH DIABETIC POLYNEUROPATHY, WITH LONG-TERM CURRENT USE OF INSULIN (HCC): ICD-10-CM

## 2021-01-18 LAB
CHOLESTEROL/HDL RATIO: 6.7
CHOLESTEROL: 167 MG/DL
ESTIMATED AVERAGE GLUCOSE: 280 MG/DL
HBA1C MFR BLD: 11.4 % (ref 4–6)
HDLC SERPL-MCNC: 25 MG/DL
LDL CHOLESTEROL DIRECT: 89 MG/DL
LDL CHOLESTEROL: ABNORMAL MG/DL (ref 0–130)
TRIGL SERPL-MCNC: 454 MG/DL
VLDLC SERPL CALC-MCNC: ABNORMAL MG/DL (ref 1–30)

## 2021-01-18 PROCEDURE — 36415 COLL VENOUS BLD VENIPUNCTURE: CPT

## 2021-01-18 PROCEDURE — 83036 HEMOGLOBIN GLYCOSYLATED A1C: CPT

## 2021-01-18 PROCEDURE — 83721 ASSAY OF BLOOD LIPOPROTEIN: CPT

## 2021-01-18 PROCEDURE — 80061 LIPID PANEL: CPT

## 2021-01-19 DIAGNOSIS — E78.2 MIXED HYPERLIPIDEMIA: Primary | ICD-10-CM

## 2021-01-19 RX ORDER — FENOFIBRATE 145 MG/1
145 TABLET, COATED ORAL DAILY
Qty: 30 TABLET | Refills: 3 | Status: SHIPPED | OUTPATIENT
Start: 2021-01-19 | End: 2021-06-17

## 2021-01-26 RX ORDER — BUSPIRONE HYDROCHLORIDE 10 MG/1
TABLET ORAL
COMMUNITY
Start: 2021-01-14 | End: 2021-02-15 | Stop reason: SDUPTHER

## 2021-01-27 ENCOUNTER — TELEMEDICINE (OUTPATIENT)
Dept: FAMILY MEDICINE CLINIC | Age: 36
End: 2021-01-27
Payer: COMMERCIAL

## 2021-01-27 DIAGNOSIS — E11.42 TYPE 2 DIABETES MELLITUS WITH DIABETIC POLYNEUROPATHY, WITH LONG-TERM CURRENT USE OF INSULIN (HCC): Primary | ICD-10-CM

## 2021-01-27 DIAGNOSIS — E78.2 MIXED HYPERLIPIDEMIA: ICD-10-CM

## 2021-01-27 DIAGNOSIS — Z00.00 PREVENTATIVE HEALTH CARE: ICD-10-CM

## 2021-01-27 DIAGNOSIS — E11.42 DIABETIC POLYNEUROPATHY ASSOCIATED WITH TYPE 2 DIABETES MELLITUS (HCC): ICD-10-CM

## 2021-01-27 DIAGNOSIS — I10 ESSENTIAL HYPERTENSION: ICD-10-CM

## 2021-01-27 DIAGNOSIS — Z79.4 TYPE 2 DIABETES MELLITUS WITH DIABETIC POLYNEUROPATHY, WITH LONG-TERM CURRENT USE OF INSULIN (HCC): Primary | ICD-10-CM

## 2021-01-27 DIAGNOSIS — E66.09 CLASS 1 OBESITY DUE TO EXCESS CALORIES WITH SERIOUS COMORBIDITY AND BODY MASS INDEX (BMI) OF 33.0 TO 33.9 IN ADULT: ICD-10-CM

## 2021-01-27 DIAGNOSIS — F33.41 RECURRENT MAJOR DEPRESSIVE DISORDER, IN PARTIAL REMISSION (HCC): ICD-10-CM

## 2021-01-27 PROBLEM — E66.811 CLASS 1 OBESITY DUE TO EXCESS CALORIES WITH SERIOUS COMORBIDITY AND BODY MASS INDEX (BMI) OF 33.0 TO 33.9 IN ADULT: Status: ACTIVE | Noted: 2021-01-27

## 2021-01-27 PROCEDURE — 3046F HEMOGLOBIN A1C LEVEL >9.0%: CPT | Performed by: FAMILY MEDICINE

## 2021-01-27 PROCEDURE — G8427 DOCREV CUR MEDS BY ELIG CLIN: HCPCS | Performed by: FAMILY MEDICINE

## 2021-01-27 PROCEDURE — 2022F DILAT RTA XM EVC RTNOPTHY: CPT | Performed by: FAMILY MEDICINE

## 2021-01-27 PROCEDURE — 99214 OFFICE O/P EST MOD 30 MIN: CPT | Performed by: FAMILY MEDICINE

## 2021-01-27 RX ORDER — SEMAGLUTIDE 1.34 MG/ML
0.25 INJECTION, SOLUTION SUBCUTANEOUS WEEKLY
Qty: 5 PEN | Refills: 2 | Status: SHIPPED | OUTPATIENT
Start: 2021-01-27 | End: 2021-09-22

## 2021-01-27 RX ORDER — GLYBURIDE 5 MG/1
5 TABLET ORAL
Qty: 30 TABLET | Refills: 3 | Status: SHIPPED | OUTPATIENT
Start: 2021-01-27 | End: 2021-06-17

## 2021-01-27 ASSESSMENT — ENCOUNTER SYMPTOMS
SINUS PRESSURE: 0
COUGH: 0
BACK PAIN: 0
WHEEZING: 0
CHEST TIGHTNESS: 0
SORE THROAT: 0
CONSTIPATION: 0
ABDOMINAL PAIN: 0
NAUSEA: 0
ABDOMINAL DISTENTION: 0
COLOR CHANGE: 0
DIARRHEA: 0
SHORTNESS OF BREATH: 0
BLOOD IN STOOL: 0
SINUS PAIN: 0
PHOTOPHOBIA: 0
VOMITING: 0

## 2021-01-27 NOTE — PROGRESS NOTES
[place] [address]   [address] [facility] [address]  Phone: [phone], Fax: [phone]    TELEHEALTH EVALUATION -- Audio/Visual (During XBQEB-37 public health emergency)    Patient ID verified by me prior to start of this visit    Taryn Cavanaugh (:  1985) has requested an audio/video evaluation for the following concern(s):  Chief Complaint   Patient presents with    Hypertension    Hyperlipidemia    Diabetes      HPI:  Taryn Cavanaugh is an established patient of Donny Lozada MD   Patient has a history of diabetes uncontrolled A1c has increased to 10.4, patient reports her sugars are running in 300s. She is on 55 units of Lantus and also Januvia. Patient reports lately she has not changed any diet blood sugars running sometimes 400. She has tried short acting insulin in the past and not using. Hyperlipidemia patient has normal lipid panel on recent blood work. Patient is on statins and aspirin. Hypertension controlled compliant with medications denies any side effects. Patient has tried glyburide in the past reports that was helping her sugars. General ways not helping much. Obesity stable denies any recent weight gain. Depression stable on Celexa. Reports she is little stressed because of her blood sugars. [x]Negative depression screening. []1-4 = Minimal depression   []5-9 = Mild depression   []10-14 = Moderate depression   []15-19 = Moderately severe depression   []20-27 = Severe depression  PHQ Scores 2020 2/3/2017   PHQ2 Score 0 0   PHQ9 Score 0 0     Review of Systems   Constitutional: Negative for activity change, appetite change, diaphoresis, fatigue, fever and unexpected weight change. HENT: Negative for congestion, ear pain, hearing loss, mouth sores, postnasal drip, sinus pressure, sinus pain, sneezing, sore throat and tinnitus. Eyes: Negative for photophobia and visual disturbance. Respiratory: Negative for cough, chest tightness, shortness of breath and wheezing. Cardiovascular: Negative for chest pain, palpitations and leg swelling. Gastrointestinal: Negative for abdominal distention, abdominal pain, blood in stool, constipation, diarrhea, nausea and vomiting. Endocrine: Positive for polydipsia and polyphagia. Negative for polyuria. Genitourinary: Negative for difficulty urinating, dysuria, flank pain, frequency, hematuria, urgency and vaginal pain. Musculoskeletal: Negative for arthralgias, back pain, gait problem, myalgias, neck pain and neck stiffness. Skin: Negative for color change, rash and wound. Allergic/Immunologic: Negative for immunocompromised state. Neurological: Positive for numbness. Negative for dizziness, speech difficulty, weakness, light-headedness and headaches. Hematological: Negative for adenopathy. Psychiatric/Behavioral: Positive for decreased concentration. Negative for agitation, behavioral problems, dysphoric mood, hallucinations, sleep disturbance and suicidal ideas. The patient is nervous/anxious.         Patient Active Problem List    Diagnosis Date Noted    Class 1 obesity due to excess calories with serious comorbidity and body mass index (BMI) of 33.0 to 33.9 in adult 01/27/2021    Suspected COVID-19 virus infection 12/15/2020    Acute bacterial sinusitis 12/15/2020    Left ankle swelling 02/26/2020    Depression 04/07/2016    Diabetic polyneuropathy associated with type 2 diabetes mellitus ([facility] 08/24/2015    Essential hypertension 08/24/2015    Hyperlipidemia 06/08/2015    Type 2 diabetes mellitus with diabetic polyneuropathy, with long-term current use of insulin ([address] 06/01/2015        Past Surgical History:   Procedure Laterality Date    OVARY REMOVAL Right     TONSILLECTOMY      TUBAL LIGATION       Family History   Problem Relation Age of Onset    Other Mother         non alcoholic cirrhorsis liver-stage 4,fatty liver  Diabetes Mother     High Blood Pressure Mother     High Cholesterol Mother     Depression Father     Anxiety Disorder Father     High Blood Pressure Father     High Cholesterol Father     Diabetes Father     Diabetes Paternal Grandmother      Current Outpatient Medications   Medication Sig Dispense Refill    Semaglutide,0.25 or 0.5MG/DOS, (OZEMPIC, 0.25 OR 0.5 MG/DOSE,) 2 MG/1.5ML SOPN Inject 0.25 mg into the skin once a week 5 pen 2    glyBURIDE (DIABETA) 5 MG tablet Take 1 tablet by mouth daily (with breakfast) 30 tablet 3    busPIRone (BUSPAR) 10 MG tablet       fenofibrate (TRICOR) 145 MG tablet Take 1 tablet by mouth daily 30 tablet 3    citalopram (CELEXA) 40 MG tablet take 1 tablet by mouth once daily 30 tablet 2    insulin glargine (LANTUS SOLOSTAR) 100 UNIT/ML injection pen Inject 50 Units into the skin 2 times daily 5 pen 3    Insulin Pen Needle 31G X 8 MM MISC 1 each by Does not apply route daily 100 each 1    aspirin EC 81 MG EC tablet Take 1 tablet by mouth daily 90 tablet 1    SITagliptin (JANUVIA) 100 MG tablet Take 1 tablet by mouth daily 90 tablet 1    atorvastatin (LIPITOR) 40 MG tablet Take 1 tablet by mouth daily 90 tablet 3    Insulin Pen Needle 29G X 12.7MM MISC 1 each by Does not apply route daily To use with insulin 120 each 1    ibuprofen (ADVIL;MOTRIN) 800 MG tablet Take 1 tablet by mouth every 8 hours as needed for Pain 20 tablet 0    fluticasone (FLONASE) 50 MCG/ACT nasal spray 2 sprays by Nasal route daily 1 Bottle 0    B-D UF III MINI PEN NEEDLES 31G X 5 MM MISC Inject 1 each into the skin 2 times daily 100 each 1    glucose blood VI test strips (ONE TOUCH ULTRA TEST) strip TEST twice a day 100 strip 2    Lancets MISC One Touch. Patient is to test blood sugars twice a day. 100 each 2     No current facility-administered medications for this visit.         Allergies   Allergen Reactions    Pcn [Penicillins] Hives    Reglan [Metoclopramide] Other (See Comments) MCV 95.8 10/28/2012     (L) 10/28/2012     Lab Results   Component Value Date     06/02/2020    K 4.6 06/02/2020     06/02/2020    CO2 24 06/02/2020    BUN 8 06/02/2020    CREATININE 0.57 06/02/2020    GLUCOSE 260 06/02/2020    CALCIUM 8.9 06/02/2020        Due to this being a TeleHealth encounter, evaluation of the following organ systems is limited: Vitals/Constitutional/EENT/Resp/CV/GI//MS/Neuro/Skin/Heme-Lymph-Imm. ASSESSMENT/PLAN:  1. Type 2 diabetes mellitus with diabetic polyneuropathy, with long-term current use of insulin (HCC)  -Uncontrolled start on Ozempic, and also globuride that has helped patient in the past, start Humalog as needed basis monitor blood sugars, increase Lantus to 55 units follow-up in 6 weeks. - Semaglutide,0.25 or 0.5MG/DOS, (OZEMPIC, 0.25 OR 0.5 MG/DOSE,) 2 MG/1.5ML SOPN; Inject 0.25 mg into the skin once a week  Dispense: 5 pen; Refill: 2  - glyBURIDE (DIABETA) 5 MG tablet; Take 1 tablet by mouth daily (with breakfast)  Dispense: 30 tablet; Refill: 3    2. Mixed hyperlipidemia  Stable continue same medications    3. Diabetic polyneuropathy associated with type 2 diabetes mellitus ([facility] [address]  Start on Ozempic follow-up in 6 weeks  - Semaglutide,0.25 or 0.5MG/DOS, (OZEMPIC, 0.25 OR 0.5 MG/DOSE,) 2 MG/1.5ML SOPN; Inject 0.25 mg into the skin once a week  Dispense: 5 pen; Refill: 2  - glyBURIDE (DIABETA) 5 MG tablet; Take 1 tablet by mouth daily (with breakfast)  Dispense: 30 tablet; Refill: 3    4. Essential hypertension  Controlled continue same medications    5. Class 1 obesity due to excess calories with serious comorbidity and body mass index (BMI) of 33.0 to 33.9 in adult  We will discuss at next appointment encouraged to watch and monitor your diet    6. Recurrent major depressive disorder, in partial remission (HCC)  Stable on Celexa    7. Preventative health care    - Hepatitis C Antibody;  Future      Controlled Substance Monitoring: Acute and Chronic Pain Monitoring:   RX Monitoring 2/24/2017   Attestation The Prescription Monitoring Report for this patient was reviewed today. Periodic Controlled Substance Monitoring No signs of potential drug abuse or diversion identified. Orders Placed This Encounter   Procedures    Hepatitis C Antibody     Standing Status:   Future     Standing Expiration Date:   1/26/2022      Orders Placed This Encounter   Medications    Semaglutide,0.25 or 0.5MG/DOS, (OZEMPIC, 0.25 OR 0.5 MG/DOSE,) 2 MG/1.5ML SOPN     Sig: Inject 0.25 mg into the skin once a week     Dispense:  5 pen     Refill:  2    glyBURIDE (DIABETA) 5 MG tablet     Sig: Take 1 tablet by mouth daily (with breakfast)     Dispense:  30 tablet     Refill:  3      There are no discontinued medications. Rosibel Rico received counseling on the following healthy behaviors: nutrition, exercise and medication adherence  Reviewed prior labs and health maintenance. Continue current medications, diet and exercise. Discussed use, benefit, and side effects of prescribed medications. Barriers to medication compliance addressed. Patient given educational materials - see patient instructions. All patient questions answered. Patient voiced understanding. No follow-ups on file. Jennifer Loyola is a 38 y.o. female patient  being evaluated by a Virtual Visit (video visit) encounter to address concerns as mentioned above. 30A caregiver was present when appropriate. Due to this being a TeleHealth encounter (During YSATR-94 public health emergency), evaluation of the following organ systems was limited:Vitals/Constitutional/EENT/Resp/CV/GI//MS/Neuro/Skin/Heme-Lymph-Imm. Services were provided through a video synchronous discussion virtually to substitute for in-person clinic visit. This is a telehealth visit that was performed with the originating site at Patient Location: home and provider Location of [place], [place]. Verbal consent to participate in video visit was obtained. Patient ID verified by me prior to start of this visit  I discussed with the patient the nature of our telehealth visits via interactive/real-time audio/video that:  - I would evaluate the patient and recommend diagnostics and treatments based on my assessment  - Our sessions are not being recorded and that personal health information is protected  - Our team would provide follow up care in person if/when the patient needs it. Pursuant to the emergency declaration under the [address] [place] [address], [address] [place] [address] and the [employer] and [employer] Act, this Virtual Visit was conducted with patient's (and/or legal guardian's) consent, to reduce the patient's risk of exposure to COVID-19 and provide necessary medical care. The patient (and/or legal guardian) has also been advised to contact this office for worsening conditions or problems, and seek emergency medical treatment and/or call 911 if deemed necessary. This note was completed by using the assistance of a speech-recognition program. However, inadvertent computerized transcription errors may be present. Although every effort was made to ensure accuracy, no guarantees can be provided that every mistake has been identified and corrected by editing.   Electronically signed by Hao Metz MD on 1/27/21 at 2:12 PM EST

## 2021-02-15 DIAGNOSIS — E11.42 TYPE 2 DIABETES MELLITUS WITH DIABETIC POLYNEUROPATHY, WITH LONG-TERM CURRENT USE OF INSULIN (HCC): ICD-10-CM

## 2021-02-15 DIAGNOSIS — Z79.4 TYPE 2 DIABETES MELLITUS WITH DIABETIC POLYNEUROPATHY, WITH LONG-TERM CURRENT USE OF INSULIN (HCC): ICD-10-CM

## 2021-02-15 RX ORDER — BUSPIRONE HYDROCHLORIDE 10 MG/1
10 TABLET ORAL 3 TIMES DAILY
Qty: 90 TABLET | Refills: 1 | Status: SHIPPED | OUTPATIENT
Start: 2021-02-15 | End: 2021-04-19

## 2021-02-15 RX ORDER — INSULIN GLARGINE 100 [IU]/ML
50 INJECTION, SOLUTION SUBCUTANEOUS 2 TIMES DAILY
Qty: 5 PEN | Refills: 3 | Status: SHIPPED | OUTPATIENT
Start: 2021-02-15 | End: 2021-04-28

## 2021-03-10 ENCOUNTER — TELEMEDICINE (OUTPATIENT)
Dept: FAMILY MEDICINE CLINIC | Age: 36
End: 2021-03-10
Payer: COMMERCIAL

## 2021-03-10 DIAGNOSIS — Z79.4 TYPE 2 DIABETES MELLITUS WITH DIABETIC POLYNEUROPATHY, WITH LONG-TERM CURRENT USE OF INSULIN (HCC): Primary | ICD-10-CM

## 2021-03-10 DIAGNOSIS — E11.42 TYPE 2 DIABETES MELLITUS WITH DIABETIC POLYNEUROPATHY, WITH LONG-TERM CURRENT USE OF INSULIN (HCC): Primary | ICD-10-CM

## 2021-03-10 DIAGNOSIS — E78.2 MIXED HYPERLIPIDEMIA: ICD-10-CM

## 2021-03-10 DIAGNOSIS — I10 ESSENTIAL HYPERTENSION: ICD-10-CM

## 2021-03-10 PROCEDURE — 2022F DILAT RTA XM EVC RTNOPTHY: CPT | Performed by: FAMILY MEDICINE

## 2021-03-10 PROCEDURE — G8427 DOCREV CUR MEDS BY ELIG CLIN: HCPCS | Performed by: FAMILY MEDICINE

## 2021-03-10 PROCEDURE — 3046F HEMOGLOBIN A1C LEVEL >9.0%: CPT | Performed by: FAMILY MEDICINE

## 2021-03-10 PROCEDURE — 99213 OFFICE O/P EST LOW 20 MIN: CPT | Performed by: FAMILY MEDICINE

## 2021-03-10 ASSESSMENT — ENCOUNTER SYMPTOMS
WHEEZING: 0
ANAL BLEEDING: 0
VOMITING: 0
COUGH: 0
ABDOMINAL DISTENTION: 0
SINUS PRESSURE: 0
DIARRHEA: 0
SHORTNESS OF BREATH: 0
COLOR CHANGE: 0
BLOOD IN STOOL: 0
PHOTOPHOBIA: 0
CHEST TIGHTNESS: 0

## 2021-03-10 NOTE — PROGRESS NOTES
[place] [address] [facility] [address]  Phone: [phone], Fax: [phone]    TELEHEALTH EVALUATION -- Audio/Visual (During PLMGO-59 public health emergency)    Patient ID verified by me prior to start of this visit    Golden Person (:  1985) has requested an audio/video evaluation for the following concern(s):  Chief Complaint   Patient presents with    Diabetes    Hypertension      HPI:  Golden Person is an established patient of Teodora Saint, MD  . Patient has a history of diabetes uncontrolled, patient was started on Ozempic and also on glyburide, patient is also on insulin. Patient reports her sugars improved, to log her sugars are 90 and highest has been 180. Patient reports she checks her sugars daily, compliant with medications. Patient denies any hypo or hyperglycemic episodes. Patient denies any side effects from medications. Hypertension not on any medications controlled. Hyperlipidemia on statins, had mild high triglycerides is also on fenofibrate. [x]Negative depression screening. []1-4 = Minimal depression   []5-9 = Mild depression   []10-14 = Moderate depression   []15-19 = Moderately severe depression   []20-27 = Severe depression  PHQ Scores 2020 2/3/2017   PHQ2 Score 0 0   PHQ9 Score 0 0     Review of Systems   Constitutional: Negative for activity change, appetite change, fatigue and unexpected weight change. HENT: Negative for nosebleeds and sinus pressure. Eyes: Negative for photophobia and visual disturbance. Respiratory: Negative for cough, chest tightness, shortness of breath and wheezing. Cardiovascular: Negative for chest pain, palpitations and leg swelling. Gastrointestinal: Negative for abdominal distention, anal bleeding, blood in stool, diarrhea and vomiting. Endocrine: Negative for polyphagia and polyuria.    Genitourinary: Negative for difficulty urinating, flank pain, frequency, hematuria and pelvic pain. Musculoskeletal: Negative for arthralgias, gait problem and myalgias. Skin: Negative for color change and rash. Neurological: Positive for numbness. Negative for weakness and headaches. Psychiatric/Behavioral: Negative for agitation, decreased concentration, dysphoric mood, hallucinations and sleep disturbance. The patient is nervous/anxious.         Patient Active Problem List    Diagnosis Date Noted    Class 1 obesity due to excess calories with serious comorbidity and body mass index (BMI) of 33.0 to 33.9 in adult 01/27/2021    Suspected COVID-19 virus infection 12/15/2020    Acute bacterial sinusitis 12/15/2020    Left ankle swelling 02/26/2020    Depression 04/07/2016    Diabetic polyneuropathy associated with type 2 diabetes mellitus ([facility] [address] [address] 08/24/2015    Essential hypertension 08/24/2015    Hyperlipidemia 06/08/2015    Type 2 diabetes mellitus with diabetic polyneuropathy, with long-term current use of insulin ([facility] [address] [facility] 06/01/2015        Past Surgical History:   Procedure Laterality Date    OVARY REMOVAL Right     TONSILLECTOMY      TUBAL LIGATION       Family History   Problem Relation Age of Onset    Other Mother         non alcoholic cirrhorsis liver-stage 4,fatty liver    Diabetes Mother     High Blood Pressure Mother     High Cholesterol Mother     Depression Father     Anxiety Disorder Father     High Blood Pressure Father     High Cholesterol Father     Diabetes Father     Diabetes Paternal Grandmother      Current Outpatient Medications   Medication Sig Dispense Refill    insulin glargine (LANTUS SOLOSTAR) 100 UNIT/ML injection pen Inject 50 Units into the skin 2 times daily 5 pen 3    busPIRone (BUSPAR) 10 MG tablet Take 1 tablet by mouth 3 times daily 90 tablet 1    Continuous Blood Gluc Sensor (DEXCOM G4 SENSOR) MISC Use daily for checking blood sugars 1 each 0    Semaglutide,0.25 or 0.5MG/DOS, (OZEMPIC, 0.25 OR 0.5 MG/DOSE,) 2 MG/1.5ML SOPN Inject 0.25 mg into the skin once a week 5 pen 2    glyBURIDE (DIABETA) 5 MG tablet Take 1 tablet by mouth daily (with breakfast) 30 tablet 3    fenofibrate (TRICOR) 145 MG tablet Take 1 tablet by mouth daily 30 tablet 3    citalopram (CELEXA) 40 MG tablet take 1 tablet by mouth once daily 30 tablet 2    Insulin Pen Needle 31G X 8 MM MISC 1 each by Does not apply route daily 100 each 1    aspirin EC 81 MG EC tablet Take 1 tablet by mouth daily 90 tablet 1    SITagliptin (JANUVIA) 100 MG tablet Take 1 tablet by mouth daily 90 tablet 1    atorvastatin (LIPITOR) 40 MG tablet Take 1 tablet by mouth daily 90 tablet 3    Insulin Pen Needle 29G X 12.7MM MISC 1 each by Does not apply route daily To use with insulin 120 each 1    ibuprofen (ADVIL;MOTRIN) 800 MG tablet Take 1 tablet by mouth every 8 hours as needed for Pain 20 tablet 0    fluticasone (FLONASE) 50 MCG/ACT nasal spray 2 sprays by Nasal route daily 1 Bottle 0    B-D UF III MINI PEN NEEDLES 31G X 5 MM MISC Inject 1 each into the skin 2 times daily 100 each 1    glucose blood VI test strips (ONE TOUCH ULTRA TEST) strip TEST twice a day 100 strip 2    Lancets MISC One Touch. Patient is to test blood sugars twice a day. 100 each 2     No current facility-administered medications for this visit. Allergies   Allergen Reactions    Pcn [Penicillins] Hives    Reglan [Metoclopramide] Other (See Comments)     Agitated        Social History     Tobacco Use    Smoking status: Current Every Day Smoker     Packs/day: 2.00     Years: 23.00     Pack years: 46.00     Types: Cigarettes    Smokeless tobacco: Never Used   Substance Use Topics    Alcohol use:  Yes     Alcohol/week: 0.0 standard drinks    Drug use: Not Currently     Comment: previous IV heroin abuse; clean since 10/26/2012        PHYSICAL EXAMINATION:  Vital Signs: (As obtained by patient/caregiver or practitioner observation)     Patient-Reported Vitals 1/26/2021   Patient-Reported Weight 192 Patient-Reported Height 5 3.6        Constitutional: [x] Appears well-developed and well-nourished [x] No apparent distress      [] Abnormal-   Mental status  [x] Alert and awake  [x] Oriented to person/place/time [x]Able to follow commands      Eyes:  EOM    [x]  Normal  [] Abnormal-  Sclera  [x]  Normal  [] Abnormal -         Discharge [x]  None visible  [] Abnormal -    HENT:   [x] Normocephalic, atraumatic. [] Abnormal   [x] Mouth/Throat: Mucous membranes are moist.     External Ears [x] Normal  [] Abnormal-     Neck: [x] No visualized mass     Pulmonary/Chest: [x] Respiratory effort normal.  [x] No visualized signs of difficulty breathing or respiratory distress        [] Abnormal     Musculoskeletal:   [x] Normal gait with no signs of ataxia         [x] Normal range of motion of neck        [] Abnormal-     Neurological:        [x] No Facial Asymmetry (Cranial nerve 7 motor function) (limited exam to video visit)          [x] No gaze palsy        [] Abnormal-     Skin:        [x] No significant exanthematous lesions or discoloration noted on facial skin         [] Abnormal-     Psychiatric:       [x] Normal Affect [x] No Hallucinations        [x] Abnormal- Anxious, with pressured speech    Other pertinent observable physical exam findings-   Lab Results   Component Value Date    WBC 6.9 10/28/2012    HGB 14.7 10/28/2012    HCT 43.1 10/28/2012    MCV 95.8 10/28/2012     (L) 10/28/2012     Lab Results   Component Value Date     06/02/2020    K 4.6 06/02/2020     06/02/2020    CO2 24 06/02/2020    BUN 8 06/02/2020    CREATININE 0.57 06/02/2020    GLUCOSE 260 06/02/2020    CALCIUM 8.9 06/02/2020        Due to this being a TeleHealth encounter, evaluation of the following organ systems is limited: Vitals/Constitutional/EENT/Resp/CV/GI//MS/Neuro/Skin/Heme-Lymph-Imm. ASSESSMENT/PLAN:  1.  Type 2 diabetes mellitus with diabetic polyneuropathy, with long-term current use of insulin (HCC)  Blood sugars are improving, will continue same regimen. Follow-up in 6 weeks to repeat A1c    2. Essential hypertension  Controlled not on any medications continue to monitor  3. Mixed hyperlipidemia  Stable continue statins    Controlled Substance Monitoring:  Acute and Chronic Pain Monitoring:   RX Monitoring 2/24/2017   Attestation The Prescription Monitoring Report for this patient was reviewed today. Periodic Controlled Substance Monitoring No signs of potential drug abuse or diversion identified. No orders of the defined types were placed in this encounter. No orders of the defined types were placed in this encounter. There are no discontinued medications. Jordon Serna received counseling on the following healthy behaviors: nutrition, exercise and medication adherence  Reviewed prior labs and health maintenance. Continue current medications, diet and exercise. Discussed use, benefit, and side effects of prescribed medications. Barriers to medication compliance addressed. Patient given educational materials - see patient instructions. All patient questions answered. Patient voiced understanding. Return in about 6 weeks (around 4/21/2021) for dm ,htn, hld, A1C, in office, needs dm foot , hep b vacc, . Albertine Kawasaki is a 43 y.o. female patient  being evaluated by a Virtual Visit (video visit) encounter to address concerns as mentioned above. A caregiver was present when appropriate. Due to this being a TeleHealth encounter (During Counts include 234 beds at the [facility]- public health emergency), evaluation of the following organ systems was limited:Vitals/Constitutional/EENT/Resp/CV/GI//MS/Neuro/Skin/Heme-Lymph-Imm. Services were provided through a video synchronous discussion virtually to substitute for in-person clinic visit. This is a telehealth visit that was performed with the originating site at Patient Location: home and provider Location of [place], [place].      Verbal consent to participate in video visit was obtained. Patient ID verified by me prior to start of this visit  I discussed with the patient the nature of our telehealth visits via interactive/real-time audio/video that:  - I would evaluate the patient and recommend diagnostics and treatments based on my assessment  - Our sessions are not being recorded and that personal health information is protected  - Our team would provide follow up care in person if/when the patient needs it. Pursuant to the emergency declaration under the [address] [place] [address], [address] [place] [address] and the [employer] Act, this Virtual Visit was conducted with patient's (and/or legal guardian's) consent, to reduce the patient's risk of exposure to COVID-19 and provide necessary medical care. The patient (and/or legal guardian) has also been advised to contact this office for worsening conditions or problems, and seek emergency medical treatment and/or call 911 if deemed necessary. This note was completed by using the assistance of a speech-recognition program. However, inadvertent computerized transcription errors may be present. Although every effort was made to ensure accuracy, no guarantees can be provided that every mistake has been identified and corrected by editing.   Electronically signed by Cathie Emerson MD on 3/10/21 at 2:27 PM EST

## 2021-03-26 DIAGNOSIS — Z79.4 TYPE 2 DIABETES MELLITUS WITH DIABETIC POLYNEUROPATHY, WITH LONG-TERM CURRENT USE OF INSULIN (HCC): ICD-10-CM

## 2021-03-26 DIAGNOSIS — E11.42 TYPE 2 DIABETES MELLITUS WITH DIABETIC POLYNEUROPATHY, WITH LONG-TERM CURRENT USE OF INSULIN (HCC): ICD-10-CM

## 2021-03-26 RX ORDER — SITAGLIPTIN 100 MG/1
TABLET, FILM COATED ORAL
Qty: 90 TABLET | Refills: 1 | Status: SHIPPED | OUTPATIENT
Start: 2021-03-26 | End: 2021-11-22

## 2021-03-29 DIAGNOSIS — F33.41 RECURRENT MAJOR DEPRESSIVE DISORDER, IN PARTIAL REMISSION (HCC): ICD-10-CM

## 2021-03-29 DIAGNOSIS — F41.9 ANXIETY: ICD-10-CM

## 2021-03-30 RX ORDER — CITALOPRAM 40 MG/1
TABLET ORAL
Qty: 30 TABLET | Refills: 2 | Status: SHIPPED | OUTPATIENT
Start: 2021-03-30 | End: 2021-07-19

## 2021-04-19 RX ORDER — BUSPIRONE HYDROCHLORIDE 10 MG/1
TABLET ORAL
Qty: 90 TABLET | Refills: 1 | Status: SHIPPED | OUTPATIENT
Start: 2021-04-19 | End: 2021-07-20 | Stop reason: SDUPTHER

## 2021-04-19 NOTE — TELEPHONE ENCOUNTER
Please Approve or Refuse.   Send to Pharmacy per Pt's Request:      Next Visit Date:  Visit date not found   Last Visit Date: 3/10/2021    Hemoglobin A1C (%)   Date Value   01/18/2021 11.4 (H)   06/02/2020 7.7 (H)   02/26/2020 12.1             ( goal A1C is < 7)   BP Readings from Last 3 Encounters:   02/26/20 132/84   09/02/19 (!) 153/89   09/23/18 (!) 143/79          (goal 120/80)  BUN   Date Value Ref Range Status   06/02/2020 8 6 - 20 mg/dL Final     CREATININE   Date Value Ref Range Status   06/02/2020 0.57 0.50 - 0.90 mg/dL Final     Potassium   Date Value Ref Range Status   06/02/2020 4.6 3.7 - 5.3 mmol/L Final

## 2021-04-21 DIAGNOSIS — Z79.4 TYPE 2 DIABETES MELLITUS WITH DIABETIC POLYNEUROPATHY, WITH LONG-TERM CURRENT USE OF INSULIN (HCC): Primary | ICD-10-CM

## 2021-04-21 DIAGNOSIS — E11.42 TYPE 2 DIABETES MELLITUS WITH DIABETIC POLYNEUROPATHY, WITH LONG-TERM CURRENT USE OF INSULIN (HCC): Primary | ICD-10-CM

## 2021-04-28 DIAGNOSIS — Z79.4 TYPE 2 DIABETES MELLITUS WITH DIABETIC POLYNEUROPATHY, WITH LONG-TERM CURRENT USE OF INSULIN (HCC): ICD-10-CM

## 2021-04-28 DIAGNOSIS — E11.42 TYPE 2 DIABETES MELLITUS WITH DIABETIC POLYNEUROPATHY, WITH LONG-TERM CURRENT USE OF INSULIN (HCC): ICD-10-CM

## 2021-04-28 RX ORDER — INSULIN GLARGINE 100 [IU]/ML
INJECTION, SOLUTION SUBCUTANEOUS
Qty: 15 ML | Refills: 2 | Status: SHIPPED | OUTPATIENT
Start: 2021-04-28 | Stop reason: SDUPTHER

## 2021-06-17 DIAGNOSIS — Z79.4 TYPE 2 DIABETES MELLITUS WITH DIABETIC POLYNEUROPATHY, WITH LONG-TERM CURRENT USE OF INSULIN (HCC): ICD-10-CM

## 2021-06-17 DIAGNOSIS — E11.42 DIABETIC POLYNEUROPATHY ASSOCIATED WITH TYPE 2 DIABETES MELLITUS (HCC): ICD-10-CM

## 2021-06-17 DIAGNOSIS — E78.2 MIXED HYPERLIPIDEMIA: ICD-10-CM

## 2021-06-17 DIAGNOSIS — E11.42 TYPE 2 DIABETES MELLITUS WITH DIABETIC POLYNEUROPATHY, WITH LONG-TERM CURRENT USE OF INSULIN (HCC): ICD-10-CM

## 2021-06-17 RX ORDER — FENOFIBRATE 145 MG/1
TABLET, COATED ORAL
Qty: 30 TABLET | Refills: 3 | Status: SHIPPED | OUTPATIENT
Start: 2021-06-17 | End: 2021-11-22

## 2021-06-17 RX ORDER — GLYBURIDE 5 MG/1
TABLET ORAL
Qty: 30 TABLET | Refills: 3 | Status: SHIPPED | OUTPATIENT
Start: 2021-06-17 | End: 2021-11-22

## 2021-06-17 RX ORDER — ATORVASTATIN CALCIUM 40 MG/1
TABLET, FILM COATED ORAL
Qty: 90 TABLET | Refills: 3 | Status: SHIPPED | OUTPATIENT
Start: 2021-06-17 | End: 2022-08-11

## 2021-06-19 ENCOUNTER — HOSPITAL ENCOUNTER (OUTPATIENT)
Age: 36
Discharge: HOME | End: 2021-06-19
Payer: COMMERCIAL

## 2021-06-19 DIAGNOSIS — Z79.4 TYPE 2 DIABETES MELLITUS WITH DIABETIC POLYNEUROPATHY, WITH LONG-TERM CURRENT USE OF INSULIN (HCC): ICD-10-CM

## 2021-06-19 DIAGNOSIS — E11.42 TYPE 2 DIABETES MELLITUS WITH DIABETIC POLYNEUROPATHY, WITH LONG-TERM CURRENT USE OF INSULIN (HCC): ICD-10-CM

## 2021-06-19 PROCEDURE — 36415 COLL VENOUS BLD VENIPUNCTURE: CPT

## 2021-06-19 PROCEDURE — 83036 HEMOGLOBIN GLYCOSYLATED A1C: CPT

## 2021-06-20 LAB
ESTIMATED AVERAGE GLUCOSE: 177 MG/DL
HBA1C MFR BLD: 7.8 % (ref 4–6)

## 2021-06-21 ENCOUNTER — OFFICE VISIT (OUTPATIENT)
Dept: FAMILY MEDICINE CLINIC | Age: 36
End: 2021-06-21
Payer: COMMERCIAL

## 2021-06-21 VITALS
WEIGHT: 212 LBS | TEMPERATURE: 97 F | SYSTOLIC BLOOD PRESSURE: 120 MMHG | HEIGHT: 64 IN | OXYGEN SATURATION: 98 % | HEART RATE: 82 BPM | DIASTOLIC BLOOD PRESSURE: 74 MMHG | BODY MASS INDEX: 36.19 KG/M2

## 2021-06-21 DIAGNOSIS — E11.42 TYPE 2 DIABETES MELLITUS WITH DIABETIC POLYNEUROPATHY, WITH LONG-TERM CURRENT USE OF INSULIN (HCC): Primary | ICD-10-CM

## 2021-06-21 DIAGNOSIS — E78.2 MIXED HYPERLIPIDEMIA: ICD-10-CM

## 2021-06-21 DIAGNOSIS — Z79.4 TYPE 2 DIABETES MELLITUS WITH DIABETIC POLYNEUROPATHY, WITH LONG-TERM CURRENT USE OF INSULIN (HCC): Primary | ICD-10-CM

## 2021-06-21 DIAGNOSIS — Z00.00 PREVENTATIVE HEALTH CARE: ICD-10-CM

## 2021-06-21 DIAGNOSIS — I10 ESSENTIAL HYPERTENSION: ICD-10-CM

## 2021-06-21 DIAGNOSIS — E66.01 CLASS 2 SEVERE OBESITY DUE TO EXCESS CALORIES WITH SERIOUS COMORBIDITY AND BODY MASS INDEX (BMI) OF 36.0 TO 36.9 IN ADULT (HCC): ICD-10-CM

## 2021-06-21 DIAGNOSIS — F33.41 RECURRENT MAJOR DEPRESSIVE DISORDER, IN PARTIAL REMISSION (HCC): ICD-10-CM

## 2021-06-21 DIAGNOSIS — B35.1 ONYCHOMYCOSIS: ICD-10-CM

## 2021-06-21 PROBLEM — Z20.822 SUSPECTED COVID-19 VIRUS INFECTION: Status: RESOLVED | Noted: 2020-12-15 | Resolved: 2021-06-21

## 2021-06-21 PROBLEM — B96.89 ACUTE BACTERIAL SINUSITIS: Status: RESOLVED | Noted: 2020-12-15 | Resolved: 2021-06-21

## 2021-06-21 PROBLEM — J01.90 ACUTE BACTERIAL SINUSITIS: Status: RESOLVED | Noted: 2020-12-15 | Resolved: 2021-06-21

## 2021-06-21 PROCEDURE — 99214 OFFICE O/P EST MOD 30 MIN: CPT | Performed by: FAMILY MEDICINE

## 2021-06-21 PROCEDURE — 2022F DILAT RTA XM EVC RTNOPTHY: CPT | Performed by: FAMILY MEDICINE

## 2021-06-21 PROCEDURE — G8417 CALC BMI ABV UP PARAM F/U: HCPCS | Performed by: FAMILY MEDICINE

## 2021-06-21 PROCEDURE — 4004F PT TOBACCO SCREEN RCVD TLK: CPT | Performed by: FAMILY MEDICINE

## 2021-06-21 PROCEDURE — 3051F HG A1C>EQUAL 7.0%<8.0%: CPT | Performed by: FAMILY MEDICINE

## 2021-06-21 PROCEDURE — G8427 DOCREV CUR MEDS BY ELIG CLIN: HCPCS | Performed by: FAMILY MEDICINE

## 2021-06-21 SDOH — ECONOMIC STABILITY: FOOD INSECURITY: WITHIN THE PAST 12 MONTHS, YOU WORRIED THAT YOUR FOOD WOULD RUN OUT BEFORE YOU GOT MONEY TO BUY MORE.: NEVER TRUE

## 2021-06-21 SDOH — ECONOMIC STABILITY: HOUSING INSECURITY
IN THE LAST 12 MONTHS, WAS THERE A TIME WHEN YOU DID NOT HAVE A STEADY PLACE TO SLEEP OR SLEPT IN A SHELTER (INCLUDING NOW)?: NO

## 2021-06-21 SDOH — ECONOMIC STABILITY: INCOME INSECURITY: IN THE LAST 12 MONTHS, WAS THERE A TIME WHEN YOU WERE NOT ABLE TO PAY THE MORTGAGE OR RENT ON TIME?: NO

## 2021-06-21 SDOH — ECONOMIC STABILITY: FOOD INSECURITY: WITHIN THE PAST 12 MONTHS, THE FOOD YOU BOUGHT JUST DIDN'T LAST AND YOU DIDN'T HAVE MONEY TO GET MORE.: NEVER TRUE

## 2021-06-21 ASSESSMENT — ENCOUNTER SYMPTOMS
VOMITING: 0
COLOR CHANGE: 0
COUGH: 0
DIARRHEA: 0
ABDOMINAL PAIN: 0
WHEEZING: 0
CONSTIPATION: 0
SORE THROAT: 0
BACK PAIN: 1
NAUSEA: 0
ABDOMINAL DISTENTION: 0
CHEST TIGHTNESS: 0
SHORTNESS OF BREATH: 0
PHOTOPHOBIA: 0
SINUS PRESSURE: 0

## 2021-06-21 ASSESSMENT — PATIENT HEALTH QUESTIONNAIRE - PHQ9
SUM OF ALL RESPONSES TO PHQ QUESTIONS 1-9: 2
SUM OF ALL RESPONSES TO PHQ9 QUESTIONS 1 & 2: 2
SUM OF ALL RESPONSES TO PHQ QUESTIONS 1-9: 2
SUM OF ALL RESPONSES TO PHQ QUESTIONS 1-9: 2
1. LITTLE INTEREST OR PLEASURE IN DOING THINGS: 1
2. FEELING DOWN, DEPRESSED OR HOPELESS: 1

## 2021-06-21 ASSESSMENT — SOCIAL DETERMINANTS OF HEALTH (SDOH): HOW HARD IS IT FOR YOU TO PAY FOR THE VERY BASICS LIKE FOOD, HOUSING, MEDICAL CARE, AND HEATING?: NOT HARD AT ALL

## 2021-06-21 NOTE — PROGRESS NOTES
Chief Complaint   Patient presents with    Hypertension    Hyperlipidemia    Diabetes         Delta Barthel  here today for follow up on chronic medical problems, go over labs and/or diagnostic studies, and medication refills. Hypertension, Hyperlipidemia, and Diabetes      HPI: Patient is here for diabetes and hypertension. Diabetes controlled A1c has improved to 7.8. Patient is on GLP-1 agonist and also on insulin and oral hypoglycemic. Patient reports sugars have improved. Hypertension controlled denies any chest pain shortness of breath. Hyperlipidemia on statins. Is due for blood work. Obesity getting worse, patient has not lost any weight on Ozempic. Patient is also on medications which are as follows her weight gain Celexa and glyburide. Patient is not willing to change Celexa discussed about Wellbutrin. Patient reports she gets very depressed if she will stop Celexa. She is not able to sleep. She is also on BuSpar 10 mg 3 times daily. Patient has thickening of nails, does not want to see podiatrist.        /74   Pulse 82   Temp 97 °F (36.1 °C)   Ht 5' 3.6\" (1.615 m)   Wt 212 lb (96.2 kg)   LMP 06/14/2021 (Approximate)   SpO2 98%   BMI 36.85 kg/m²    Body mass index is 36.85 kg/m². Wt Readings from Last 3 Encounters:   06/21/21 212 lb (96.2 kg)   06/01/20 192 lb (87.1 kg)   02/26/20 190 lb 6.4 oz (86.4 kg)        []Negative depression screening. PHQ Scores 6/21/2021 9/16/2020 2/3/2017   PHQ2 Score 2 0 0   PHQ9 Score 2 0 0      [x]1-4 = Minimal depression   []5-9 = Milddepression   []10-14 = Moderate depression   []15-19 = Moderately severe depression   []20-27 = Severe depression    Discussed testing with the patient and all questions fully answered.     Hospital Outpatient Visit on 06/19/2021   Component Date Value Ref Range Status    Hemoglobin A1C 06/19/2021 7.8* 4.0 - 6.0 % Final    Estimated Avg Glucose 06/19/2021 177  mg/dL Final    Comment: The ADA and AACC recommend providing the estimated average glucose result to permit better   patient understanding of their HBA1c result. Most recent labs reviewed:     Lab Results   Component Value Date    WBC 6.9 10/28/2012    HGB 14.7 10/28/2012    HCT 43.1 10/28/2012    MCV 95.8 10/28/2012     (L) 10/28/2012       @BRIEFLAB(NA,K,CL,CO2,BUN,CREATININE,GLUCOSE,CALCIUM)@     Lab Results   Component Value Date    ALT 18 06/02/2020    AST 15 06/02/2020    ALKPHOS 153 (H) 06/02/2020    BILITOT 0.27 (L) 06/02/2020       Lab Results   Component Value Date    TSH 0.74 03/31/2020       Lab Results   Component Value Date    CHOL 167 01/18/2021    CHOL 150 04/05/2016    CHOL 231 06/04/2015     Lab Results   Component Value Date    TRIG 454 (H) 01/18/2021    TRIG 228 04/05/2016    TRIG 271 06/04/2015     Lab Results   Component Value Date    HDL 25 (L) 01/18/2021    HDL 36 (L) 03/31/2020    HDL 26 (A) 04/05/2016     Lab Results   Component Value Date    LDLCALC 78 04/05/2016    LDLCALC 150 06/04/2015    LDLCHOLESTEROL      01/18/2021    LDLCHOLESTEROL 171 (H) 03/31/2020     Lab Results   Component Value Date    VLDL NOT REPORTED 01/18/2021    VLDL NOT REPORTED (H) 03/31/2020    VLDL 46 04/05/2016     Lab Results   Component Value Date    CHOLHDLRATIO 6.7 (H) 01/18/2021    CHOLHDLRATIO 6.7 (H) 03/31/2020    CHOLHDLRATIO 5.8 04/05/2016       Lab Results   Component Value Date    LABA1C 7.8 (H) 06/19/2021       No results found for: [id number]    No results found for: FOLATE    No results found for: IRON, TIBC, FERRITIN    No results found for: VITD25          Current Outpatient Medications   Medication Sig Dispense Refill    ciclopirox (PENLAC) 8 % solution Apply topically nightly. Apply to adjacent skin and affected nails daily . Remove with alcohol every 7 days.  TOTAL OF 3 MO. 6 mL 1    atorvastatin (LIPITOR) 40 MG tablet take 1 tablet by mouth once daily 90 tablet 3    fenofibrate (TRICOR) 145 MG tablet take 1 tablet by mouth once daily 30 tablet 3    glyBURIDE (DIABETA) 5 MG tablet take 1 tablet by mouth daily WITH BREAKFAST 30 tablet 3    LANTUS SOLOSTAR 100 UNIT/ML injection pen inject 50 units subcutaneously twice a day 15 mL 2    busPIRone (BUSPAR) 10 MG tablet take 1 tablet by mouth three times a day 90 tablet 1    citalopram (CELEXA) 40 MG tablet take 1 tablet by mouth once daily 30 tablet 2    JANUVIA 100 MG tablet take 1 tablet by mouth once daily 90 tablet 1    Semaglutide,0.25 or 0.5MG/DOS, (OZEMPIC, 0.25 OR 0.5 MG/DOSE,) 2 MG/1.5ML SOPN Inject 0.25 mg into the skin once a week 5 pen 2    Insulin Pen Needle 31G X 8 MM MISC 1 each by Does not apply route daily 100 each 1    aspirin EC 81 MG EC tablet Take 1 tablet by mouth daily 90 tablet 1    Insulin Pen Needle 29G X 12.7MM MISC 1 each by Does not apply route daily To use with insulin 120 each 1    ibuprofen (ADVIL;MOTRIN) 800 MG tablet Take 1 tablet by mouth every 8 hours as needed for Pain 20 tablet 0    B-D UF III MINI PEN NEEDLES 31G X 5 MM MISC Inject 1 each into the skin 2 times daily 100 each 1    glucose blood VI test strips (ONE TOUCH ULTRA TEST) strip TEST twice a day 100 strip 2    Lancets MISC One Touch. Patient is to test blood sugars twice a day. 100 each 2    Continuous Blood Gluc Sensor (DEXCOM G4 SENSOR) MISC Use daily for checking blood sugars 1 each 0    fluticasone (FLONASE) 50 MCG/ACT nasal spray 2 sprays by Nasal route daily 1 Bottle 0     No current facility-administered medications for this visit.              Social History     Socioeconomic History    Marital status:      Spouse name: Not on file    Number of children: Not on file    Years of education: Not on file    Highest education level: Not on file   Occupational History    Not on file   Tobacco Use    Smoking status: Current Every Day Smoker     Packs/day: 2.00     Years: 23.00     Pack years: 46.00     Types: Cigarettes    Smokeless tobacco: Never Used of Systems   Constitutional: Positive for fatigue. Negative for activity change, appetite change, diaphoresis and unexpected weight change. HENT: Negative for congestion, ear pain, nosebleeds, postnasal drip, sinus pressure, sneezing and sore throat. Eyes: Negative for photophobia and visual disturbance. Respiratory: Negative for cough, chest tightness, shortness of breath and wheezing. Cardiovascular: Negative for chest pain, palpitations and leg swelling. Gastrointestinal: Negative for abdominal distention, abdominal pain, constipation, diarrhea, nausea and vomiting. Endocrine: Negative for polyuria. Genitourinary: Negative for difficulty urinating, flank pain, frequency, hematuria, urgency and vaginal pain. Musculoskeletal: Positive for arthralgias and back pain. Negative for gait problem, joint swelling, myalgias, neck pain and neck stiffness. Skin: Positive for rash. Negative for color change. Thickening of toenails   Neurological: Negative for dizziness, speech difficulty, weakness, light-headedness, numbness and headaches. Psychiatric/Behavioral: Positive for decreased concentration and dysphoric mood. Negative for hallucinations and sleep disturbance. The patient is nervous/anxious. The patient is not hyperactive. Physical Exam  Vitals and nursing note reviewed. Constitutional:       Appearance: Normal appearance. She is obese. HENT:      Nose: Nose normal.      Mouth/Throat:      Mouth: Mucous membranes are moist.   Eyes:      General:         Right eye: No discharge. Extraocular Movements: Extraocular movements intact. Pupils: Pupils are equal, round, and reactive to light. Cardiovascular:      Rate and Rhythm: Normal rate and regular rhythm. Pulses:           Dorsalis pedis pulses are 3+ on the right side and 3+ on the left side. Heart sounds: Normal heart sounds.    Pulmonary:      Effort: Pulmonary effort is normal.      Breath sounds: Normal breath sounds. No wheezing. Chest:      Chest wall: No tenderness. Abdominal:      General: Bowel sounds are normal. There is no distension. Palpations: Abdomen is soft. Tenderness: There is no abdominal tenderness. Musculoskeletal:         General: Normal range of motion. Cervical back: Normal range of motion. Right foot: Normal range of motion. No Charcot foot or foot drop. Left foot: Normal range of motion. No Charcot foot. Feet:      Right foot:      Protective Sensation: 5 sites tested. 4 sites sensed. Toenail Condition: Right toenails are abnormally thick. Left foot:      Protective Sensation: 3 sites sensed. Toenail Condition: Left toenails are abnormally thick. Skin:     General: Skin is warm. Capillary Refill: Capillary refill takes less than 2 seconds. Neurological:      General: No focal deficit present. Mental Status: She is alert and oriented to person, place, and time. Psychiatric:         Mood and Affect: Mood is anxious. Mood is not depressed. Affect is not tearful. Speech: She is communicative. Speech is not rapid and pressured. Behavior: Behavior normal.             ASSESSMENT AND PLAN      1. Type 2 diabetes mellitus with diabetic polyneuropathy, with long-term current use of insulin ([facility])  A1c has improved to 7.8. Continue same medications  - Comprehensive Metabolic Panel; Future  - Microalbumin, Ur  - [facility] DIABETES FOOT EXAM  - CBC Auto Differential; Future    2. Essential hypertension  Controlled continue same medications  - CBC Auto Differential; Future    3. Class 2 severe obesity due to excess calories with serious comorbidity and body mass index (BMI) of 36.0 to 36.9 in adult [facility])  Worsening discussed with patient watch her diet and be physically active  - CBC Auto Differential; Future    4.  Recurrent major depressive disorder, in partial remission ([facility] [address]  Continue Celexa patient refused to change medications    5. Mixed hyperlipidemia  Continue statins    6. Onychomycosis  Start on Penlac topical  - ciclopirox (PENLAC) 8 % solution; Apply topically nightly. Apply to adjacent skin and affected nails daily . Remove with alcohol every 7 days. TOTAL OF 3 MO. Dispense: 6 mL; Refill: 1    7. Preventative health care  Pt refused hep shots       Orders Placed This Encounter   Procedures    Comprehensive Metabolic Panel     Standing Status:   Future     Standing Expiration Date:   6/18/2022    Microalbumin, Ur    CBC Auto Differential     Standing Status:   Future     Standing Expiration Date:   6/22/2022     DIABETES FOOT EXAM         There are no discontinued medications. Jb Hernández received counseling on the following healthy behaviors: nutrition, exercise and medication adherence  Reviewed prior labs and health maintenance  Continue current medications, diet and exercise. Discussed use, benefit, and side effects of prescribed medications. Barriers to medication compliance addressed. Patient given educational materials - see patient instructions  Was a self-tracking handout given in paper form or via [employer]? Yes    Requested Prescriptions     Signed Prescriptions Disp Refills    ciclopirox (PENLAC) 8 % solution 6 mL 1     Sig: Apply topically nightly. Apply to adjacent skin and affected nails daily . Remove with alcohol every 7 days. TOTAL OF 3 MO. All patient questions answered. Patient voiced understanding. Quality Measures    Body mass index is 36.85 kg/m². Elevated. Weight control planned discussed daily exercise regimen and Healthy diet and regular exercise. BP: 120/74 Blood pressure is normal. Treatment plan consists of Weight Reduction, DASH Eating Plan, Dietary Sodium Restriction, Increased Physical Activity and No treatment change needed.     Lab Results   Component Value Date    LDLCALC 78 04/05/2016    LDLCHOLESTEROL      01/18/2021    LDLDIRECT 89 01/18/2021    (goal LDL reduction with dx if diabetes is 50% LDL reduction)      PHQ Scores 6/21/2021 9/16/2020 2/3/2017   PHQ2 Score 2 0 0   PHQ9 Score 2 0 0     Interpretation of Total Score Depression Severity: 1-4 = Minimal depression, 5-9 = Mild depression, 10-14 = Moderate depression, 15-19 = Moderately severe depression, 20-27 = Severe depression    The patient'spast medical, surgical, social, and family history as well as her   current medications and allergies were reviewed as documented in today's encounter. Medications, labs, diagnostic studies, consultations andfollow-up as documented in this encounter. Return in about 3 months (around 9/21/2021) for dm ,htn, hld, A1C. Patient wasseen with total face to face time of 30 minutes. More than 50% of this visit was counseling and education. Future Appointments   Date Time Provider Alex Carl   9/22/2021  4:00 PM Amanuel Flores MD fp sc [facility]     This note was completed by using the assistance of a speech-recognition program. However, inadvertent computerized transcription errors may be present. Althoughevery effort was made to ensure accuracy, no guarantees can be provided that every mistake has been identified and corrected by editing.   Electronically signed by Amanuel Flores MD on 6/21/2021  12:23 PM

## 2021-06-23 DIAGNOSIS — Z79.4 TYPE 2 DIABETES MELLITUS WITH DIABETIC POLYNEUROPATHY, WITH LONG-TERM CURRENT USE OF INSULIN (HCC): ICD-10-CM

## 2021-06-23 DIAGNOSIS — E11.42 TYPE 2 DIABETES MELLITUS WITH DIABETIC POLYNEUROPATHY, WITH LONG-TERM CURRENT USE OF INSULIN (HCC): ICD-10-CM

## 2021-06-23 RX ORDER — INSULIN GLARGINE 100 [IU]/ML
INJECTION, SOLUTION SUBCUTANEOUS
Qty: 15 ML | Refills: 2 | Status: SHIPPED | OUTPATIENT
Start: 2021-06-23 | End: 2021-06-24 | Stop reason: SDUPTHER

## 2021-07-19 DIAGNOSIS — F41.9 ANXIETY: ICD-10-CM

## 2021-07-19 DIAGNOSIS — F33.41 RECURRENT MAJOR DEPRESSIVE DISORDER, IN PARTIAL REMISSION (HCC): ICD-10-CM

## 2021-07-19 RX ORDER — CITALOPRAM 40 MG/1
TABLET ORAL
Qty: 30 TABLET | Refills: 2 | Status: SHIPPED | OUTPATIENT
Start: 2021-07-19 | End: 2021-11-22

## 2021-07-20 RX ORDER — BUSPIRONE HYDROCHLORIDE 10 MG/1
10 TABLET ORAL 3 TIMES DAILY
Qty: 90 TABLET | Refills: 0 | Status: SHIPPED | OUTPATIENT
Start: 2021-07-20 | End: 2021-09-10

## 2021-09-10 RX ORDER — BUSPIRONE HYDROCHLORIDE 10 MG/1
TABLET ORAL
Qty: 90 TABLET | Refills: 0 | Status: SHIPPED | OUTPATIENT
Start: 2021-09-10

## 2021-09-20 ENCOUNTER — HOSPITAL ENCOUNTER (OUTPATIENT)
Age: 36
Discharge: HOME | End: 2021-09-20
Payer: COMMERCIAL

## 2021-09-20 DIAGNOSIS — I10 ESSENTIAL HYPERTENSION: ICD-10-CM

## 2021-09-20 DIAGNOSIS — E66.01 CLASS 2 SEVERE OBESITY DUE TO EXCESS CALORIES WITH SERIOUS COMORBIDITY AND BODY MASS INDEX (BMI) OF 36.0 TO 36.9 IN ADULT (HCC): ICD-10-CM

## 2021-09-20 DIAGNOSIS — Z79.4 TYPE 2 DIABETES MELLITUS WITH DIABETIC POLYNEUROPATHY, WITH LONG-TERM CURRENT USE OF INSULIN (HCC): ICD-10-CM

## 2021-09-20 DIAGNOSIS — E11.42 TYPE 2 DIABETES MELLITUS WITH DIABETIC POLYNEUROPATHY, WITH LONG-TERM CURRENT USE OF INSULIN (HCC): ICD-10-CM

## 2021-09-20 LAB
ABSOLUTE EOS #: 0.1 K/UL (ref 0–0.4)
ABSOLUTE IMMATURE GRANULOCYTE: ABNORMAL K/UL (ref 0–0.3)
ABSOLUTE LYMPH #: 3.8 K/UL (ref 1–4.8)
ABSOLUTE MONO #: 0.7 K/UL (ref 0.1–1.3)
ALBUMIN SERPL-MCNC: 4.4 G/DL (ref 3.5–5.2)
ALBUMIN/GLOBULIN RATIO: ABNORMAL (ref 1–2.5)
ALP BLD-CCNC: 151 U/L (ref 35–104)
ALT SERPL-CCNC: 27 U/L (ref 5–33)
ANION GAP SERPL CALCULATED.3IONS-SCNC: 12 MMOL/L (ref 9–17)
AST SERPL-CCNC: 26 U/L
BASOPHILS # BLD: 1 % (ref 0–2)
BASOPHILS ABSOLUTE: 0.1 K/UL (ref 0–0.2)
BILIRUB SERPL-MCNC: 0.22 MG/DL (ref 0.3–1.2)
BUN BLDV-MCNC: 10 MG/DL (ref 6–20)
BUN/CREAT BLD: ABNORMAL (ref 9–20)
CALCIUM SERPL-MCNC: 9.7 MG/DL (ref 8.6–10.4)
CHLORIDE BLD-SCNC: 102 MMOL/L (ref 98–107)
CO2: 23 MMOL/L (ref 20–31)
CREAT SERPL-MCNC: 0.79 MG/DL (ref 0.5–0.9)
DIFFERENTIAL TYPE: ABNORMAL
EOSINOPHILS RELATIVE PERCENT: 1 % (ref 0–4)
GFR AFRICAN AMERICAN: >60 ML/MIN
GFR NON-AFRICAN AMERICAN: >60 ML/MIN
GFR SERPL CREATININE-BSD FRML MDRD: ABNORMAL ML/MIN/{1.73_M2}
GFR SERPL CREATININE-BSD FRML MDRD: ABNORMAL ML/MIN/{1.73_M2}
GLUCOSE BLD-MCNC: 276 MG/DL (ref 70–99)
HBV SURFACE AB TITR SER: <3.5 MIU/ML
HCT VFR BLD CALC: 45.1 % (ref 36–46)
HEMOGLOBIN: 15.1 G/DL (ref 12–16)
IMMATURE GRANULOCYTES: ABNORMAL %
LYMPHOCYTES # BLD: 30 % (ref 24–44)
MCH RBC QN AUTO: 32 PG (ref 26–34)
MCHC RBC AUTO-ENTMCNC: 33.4 G/DL (ref 31–37)
MCV RBC AUTO: 95.9 FL (ref 80–100)
MONOCYTES # BLD: 6 % (ref 1–7)
NRBC AUTOMATED: ABNORMAL PER 100 WBC
PDW BLD-RTO: 13.1 % (ref 11.5–14.9)
PLATELET # BLD: 230 K/UL (ref 150–450)
PLATELET ESTIMATE: ABNORMAL
PMV BLD AUTO: 10.8 FL (ref 6–12)
POTASSIUM SERPL-SCNC: 3.9 MMOL/L (ref 3.7–5.3)
RBC # BLD: 4.71 M/UL (ref 4–5.2)
RBC # BLD: ABNORMAL 10*6/UL
SEG NEUTROPHILS: 62 % (ref 36–66)
SEGMENTED NEUTROPHILS ABSOLUTE COUNT: 8.1 K/UL (ref 1.3–9.1)
SODIUM BLD-SCNC: 137 MMOL/L (ref 135–144)
TOTAL PROTEIN: 7.6 G/DL (ref 6.4–8.3)
WBC # BLD: 12.8 K/UL (ref 3.5–11)
WBC # BLD: ABNORMAL 10*3/UL

## 2021-09-20 PROCEDURE — 36415 COLL VENOUS BLD VENIPUNCTURE: CPT

## 2021-09-20 PROCEDURE — 85025 COMPLETE CBC W/AUTO DIFF WBC: CPT

## 2021-09-20 PROCEDURE — 80053 COMPREHEN METABOLIC PANEL: CPT

## 2021-09-20 PROCEDURE — 86317 IMMUNOASSAY INFECTIOUS AGENT: CPT

## 2021-09-21 ASSESSMENT — PATIENT HEALTH QUESTIONNAIRE - PHQ9
SUM OF ALL RESPONSES TO PHQ9 QUESTIONS 1 & 2: 0
1. LITTLE INTEREST OR PLEASURE IN DOING THINGS: 0
SUM OF ALL RESPONSES TO PHQ QUESTIONS 1-9: 0
2. FEELING DOWN, DEPRESSED OR HOPELESS: 0
SUM OF ALL RESPONSES TO PHQ QUESTIONS 1-9: 0
SUM OF ALL RESPONSES TO PHQ QUESTIONS 1-9: 0

## 2021-09-22 ENCOUNTER — TELEMEDICINE (OUTPATIENT)
Dept: FAMILY MEDICINE CLINIC | Age: 36
End: 2021-09-22
Payer: COMMERCIAL

## 2021-09-22 DIAGNOSIS — E78.2 MIXED HYPERLIPIDEMIA: ICD-10-CM

## 2021-09-22 DIAGNOSIS — Z79.4 TYPE 2 DIABETES MELLITUS WITH DIABETIC POLYNEUROPATHY, WITH LONG-TERM CURRENT USE OF INSULIN (HCC): Primary | ICD-10-CM

## 2021-09-22 DIAGNOSIS — E11.42 TYPE 2 DIABETES MELLITUS WITH DIABETIC POLYNEUROPATHY, WITH LONG-TERM CURRENT USE OF INSULIN (HCC): Primary | ICD-10-CM

## 2021-09-22 DIAGNOSIS — I10 ESSENTIAL HYPERTENSION: ICD-10-CM

## 2021-09-22 DIAGNOSIS — D72.829 LEUKOCYTOSIS, UNSPECIFIED TYPE: ICD-10-CM

## 2021-09-22 DIAGNOSIS — E66.09 CLASS 1 OBESITY DUE TO EXCESS CALORIES WITH SERIOUS COMORBIDITY AND BODY MASS INDEX (BMI) OF 33.0 TO 33.9 IN ADULT: ICD-10-CM

## 2021-09-22 PROCEDURE — 2022F DILAT RTA XM EVC RTNOPTHY: CPT | Performed by: FAMILY MEDICINE

## 2021-09-22 PROCEDURE — 3051F HG A1C>EQUAL 7.0%<8.0%: CPT | Performed by: FAMILY MEDICINE

## 2021-09-22 PROCEDURE — 99214 OFFICE O/P EST MOD 30 MIN: CPT | Performed by: FAMILY MEDICINE

## 2021-09-22 PROCEDURE — G8427 DOCREV CUR MEDS BY ELIG CLIN: HCPCS | Performed by: FAMILY MEDICINE

## 2021-09-22 ASSESSMENT — ENCOUNTER SYMPTOMS
ANAL BLEEDING: 0
COLOR CHANGE: 0
ABDOMINAL PAIN: 0
ABDOMINAL DISTENTION: 0
SHORTNESS OF BREATH: 0
WHEEZING: 0
COUGH: 0
SINUS PRESSURE: 0
CHEST TIGHTNESS: 0
BACK PAIN: 1
DIARRHEA: 0
NAUSEA: 0
CONSTIPATION: 0
FACIAL SWELLING: 0
VOMITING: 0

## 2021-09-22 NOTE — PROGRESS NOTES
[address] [address]  Phone: [phone], Fax: [phone]    TELEHEALTH EVALUATION -- Audio/Visual (During MEMXR-21 public health emergency)    Patient ID verified by me prior to start of this visit    Nesha Barriga (:  1985) has requested an audio/video evaluation for the following concern(s):  Chief Complaint   Patient presents with    Diabetes    Hypertension    Hyperlipidemia    Health Maintenance     Due pneumoniaand flu vaccines. Pt is also due annual pap and ,microalbumin. HPI:  Nesha Barriga is an established patient of Meño Ordonez MD   Patient has a history of diabetes, A1c has improved to 7.8, patient is on insulin and also oral hypoglycemic agents. Reports sugars are improved usually run below 200. Patient has stopped taking Ozempic reports that is expensive along with Lantus. Patient cannot afford both injectables. She did not notice any weight loss on Ozempic. Hypertension controlled denies any chest pain shortness of breath. Hyperlipidemia on statins. Recent blood work showing high triglycerides. Obesity same patient is trying to work on diet and exercise. Blood work showed mild leukocytosis with 12.8, differential was normal.  Patient denies any history of infection does have history of smoking. Repeat CBC in 2 weeks. [x]Negative depression screening. []1-4 = Minimal depression   []5-9 = Mild depression   []10-14 = Moderate depression   []15-19 = Moderately severe depression   []20-27 = Severe depression  PHQ Scores 2021 2021 2020 2/3/2017   PHQ2 Score 0 2 0 0   PHQ9 Score 0 2 0 0     Review of Systems   Constitutional: Positive for unexpected weight change. Negative for activity change, fatigue and fever. HENT: Negative for congestion, ear discharge, facial swelling, postnasal drip and sinus pressure. Eyes: Negative for visual disturbance.    Respiratory: Negative for cough, chest tightness, shortness of breath and wheezing. Cardiovascular: Negative for chest pain, palpitations and leg swelling. Gastrointestinal: Negative for abdominal distention, abdominal pain, anal bleeding, constipation, diarrhea, nausea and vomiting. Endocrine: Negative for polyphagia and polyuria. Genitourinary: Negative for dyspareunia, flank pain and urgency. Musculoskeletal: Positive for back pain. Negative for arthralgias, gait problem, joint swelling, myalgias, neck pain and neck stiffness. Skin: Negative for color change, rash and wound. Neurological: Negative for dizziness, tremors, seizures, speech difficulty, weakness, numbness and headaches. Psychiatric/Behavioral: Negative for agitation, behavioral problems, decreased concentration, dysphoric mood, sleep disturbance and suicidal ideas. The patient is not nervous/anxious and is not hyperactive.         Patient Active Problem List    Diagnosis Date Noted    Leukocytosis 09/22/2021    Onychomycosis 06/21/2021    Class 1 obesity due to excess calories with serious comorbidity and body mass index (BMI) of 33.0 to 33.9 in adult 01/27/2021    Left ankle swelling 02/26/2020    Depression 04/07/2016    Diabetic polyneuropathy associated with type 2 diabetes mellitus ([facility] [address] 08/24/2015    Essential hypertension 08/24/2015    Hyperlipidemia 06/08/2015    Type 2 diabetes mellitus with diabetic polyneuropathy, with long-term current use of insulin ([address] 06/01/2015        Past Surgical History:   Procedure Laterality Date    OVARY REMOVAL Right     TONSILLECTOMY      TUBAL LIGATION       Family History   Problem Relation Age of Onset    Other Mother         non alcoholic cirrhorsis liver-stage 4,fatty liver    Diabetes Mother     High Blood Pressure Mother     High Cholesterol Mother     Depression Father     Anxiety Disorder Father     High Blood Pressure Father     High Cholesterol Father     Diabetes Father  Diabetes Paternal Grandmother      Current Outpatient Medications   Medication Sig Dispense Refill    busPIRone (BUSPAR) 10 MG tablet take 1 tablet by mouth three times a day 90 tablet 0    citalopram (CELEXA) 40 MG tablet take 1 tablet by mouth once daily 30 tablet 2    insulin glargine (LANTUS SOLOSTAR) 100 UNIT/ML injection pen inject 50 units subcutaneously twice a day 15 mL 2    ciclopirox (PENLAC) 8 % solution Apply topically nightly. Apply to adjacent skin and affected nails daily . Remove with alcohol every 7 days. TOTAL OF 3 MO. 6 mL 1    atorvastatin (LIPITOR) 40 MG tablet take 1 tablet by mouth once daily 90 tablet 3    fenofibrate (TRICOR) 145 MG tablet take 1 tablet by mouth once daily 30 tablet 3    glyBURIDE (DIABETA) 5 MG tablet take 1 tablet by mouth daily WITH BREAKFAST 30 tablet 3    JANUVIA 100 MG tablet take 1 tablet by mouth once daily 90 tablet 1    Insulin Pen Needle 31G X 8 MM MISC 1 each by Does not apply route daily 100 each 1    Insulin Pen Needle 29G X 12.7MM MISC 1 each by Does not apply route daily To use with insulin 120 each 1    ibuprofen (ADVIL;MOTRIN) 800 MG tablet Take 1 tablet by mouth every 8 hours as needed for Pain 20 tablet 0    B-D UF III MINI PEN NEEDLES 31G X 5 MM MISC Inject 1 each into the skin 2 times daily 100 each 1    glucose blood VI test strips (ONE TOUCH ULTRA TEST) strip TEST twice a day 100 strip 2    Lancets MISC One Touch. Patient is to test blood sugars twice a day. 100 each 2    aspirin EC 81 MG EC tablet Take 1 tablet by mouth daily (Patient not taking: Reported on 9/21/2021) 90 tablet 1    fluticasone (FLONASE) 50 MCG/ACT nasal spray 2 sprays by Nasal route daily 1 Bottle 0     No current facility-administered medications for this visit.        Allergies   Allergen Reactions    Pcn [Penicillins] Hives    Reglan [Metoclopramide] Other (See Comments)     Agitated        Social History     Tobacco Use    Smoking status: Current Every Day Smoker     Packs/day: 2.00     Years: 23.00     Pack years: 46.00     Types: Cigarettes    Smokeless tobacco: Never Used   Substance Use Topics    Alcohol use: Yes     Alcohol/week: 0.0 standard drinks    Drug use: Not Currently     Comment: previous IV heroin abuse; clean since 10/26/2012        PHYSICAL EXAMINATION:  Vital Signs: (As obtained by patient/caregiver or practitioner observation)  Patient-Reported Vitals 9/21/2021   Patient-Reported Weight 212lb   Patient-Reported Height 5'3        Constitutional: [x] Appears well-developed and well-nourished [x] No apparent distress      [] Abnormal-   Mental status  [x] Alert and awake  [x] Oriented to person/place/time [x]Able to follow commands      Eyes:  EOM    [x]  Normal  [] Abnormal-  Sclera  [x]  Normal  [] Abnormal -         Discharge [x]  None visible  [] Abnormal -    HENT:   [x] Normocephalic, atraumatic.   [] Abnormal   [x] Mouth/Throat: Mucous membranes are moist.     External Ears [x] Normal  [] Abnormal-     Neck: [x] No visualized mass     Pulmonary/Chest: [x] Respiratory effort normal.  [x] No visualized signs of difficulty breathing or respiratory distress        [] Abnormal     Musculoskeletal:   [x] Normal gait with no signs of ataxia         [x] Normal range of motion of neck        [] Abnormal-     Neurological:        [x] No Facial Asymmetry (Cranial nerve 7 motor function) (limited exam to video visit)          [x] No gaze palsy        [] Abnormal-     Skin:        [x] No significant exanthematous lesions or discoloration noted on facial skin         [] Abnormal-     Psychiatric:       [x] Normal Affect [x] No Hallucinations        [x] Abnormal- Anxious, with pressured speech    Other pertinent observable physical exam findings-   Lab Results   Component Value Date    WBC 12.8 (H) 09/20/2021    HGB 15.1 09/20/2021    HCT 45.1 09/20/2021    MCV 95.9 09/20/2021     09/20/2021     Lab Results   Component Value Date     09/20/2021 K 3.9 09/20/2021     09/20/2021    CO2 23 09/20/2021    BUN 10 09/20/2021    CREATININE 0.79 09/20/2021    GLUCOSE 276 09/20/2021    CALCIUM 9.7 09/20/2021        Due to this being a TeleHealth encounter, evaluation of the following organ systems is limited: Vitals/Constitutional/EENT/Resp/CV/GI//MS/Neuro/Skin/Heme-Lymph-Imm. ASSESSMENT/PLAN:  1. Type 2 diabetes mellitus with diabetic polyneuropathy, with long-term current use of insulin (HCC)  Controlled A1c is improved continue same medications discontinue Ozempic as patient cannot afford it.  - Microalbumin / Creatinine Urine Ratio; Future    2. Mixed hyperlipidemia  Continue statins repeat lipid panel in 3 months    3. Essential hypertension  Controlled continue same medications    4. Class 1 obesity due to excess calories with serious comorbidity and body mass index (BMI) of 33.0 to 33.9 in adult  Continue to work on lifestyle changes and.    5. Leukocytosis, unspecified type  Repeat CBC in 2 weeks discussed with patient if no improvement can go for further testing  - CBC Auto Differential; Future    Controlled Substance Monitoring:  Acute and Chronic Pain Monitoring:   RX Monitoring 2/24/2017   Attestation The Prescription Monitoring Report for this patient was reviewed today. Periodic Controlled Substance Monitoring No signs of potential drug abuse or diversion identified. Orders Placed This Encounter   Procedures    CBC Auto Differential     Standing Status:   Future     Standing Expiration Date:   9/23/2022    Microalbumin / Creatinine Urine Ratio     Standing Status:   Future     Standing Expiration Date:   9/22/2022      No orders of the defined types were placed in this encounter.      Medications Discontinued During This Encounter   Medication Reason    Semaglutide,0.25 or 0.5MG/DOS, (OZEMPIC, 0.25 OR 0.5 MG/DOSE,) 2 MG/1.5ML SOPN Cost of medication      Deondre Roberts received counseling on the following healthy behaviors: nutrition, exercise, medication adherence and tobacco cessation  Reviewed prior labs and health maintenance. Continue current medications, diet and exercise. Discussed use, benefit, and side effects of prescribed medications. Barriers to medication compliance addressed. Patient given educational materials - see patient instructions. All patient questions answered. Patient voiced understanding. Return in about 3 months (around 12/22/2021) for dm ,htn, hld, A1C. Tayler Carballo is a 43 y.o. female patient  being evaluated by a Virtual Visit (video visit) encounter to address concerns as mentioned above. A caregiver was present when appropriate. Due to this being a TeleHealth encounter ([facility]), evaluation of the following organ systems was limited:Vitals/Constitutional/EENT/Resp/CV/GI//MS/Neuro/Skin/Heme-Lymph-Imm. Services were provided through a video synchronous discussion virtually to substitute for in-person clinic visit. This is a telehealth visit that was performed with the originating site at Patient Location: home and provider Location of [place], [place]. Verbal consent to participate in video visit was obtained. Patient ID verified by me prior to start of this visit  I discussed with the patient the nature of our telehealth visits via interactive/real-time audio/video that:  - I would evaluate the patient and recommend diagnostics and treatments based on my assessment  - Our sessions are not being recorded and that personal health information is protected  - Our team would provide follow up care in person if/when the patient needs it.      Pursuant to the emergency declaration under the [facility], [address] [place] [address] authority and the [employer] Act, this Virtual Visit was conducted with patient's (and/or legal guardian's) consent, to reduce the patient's risk of exposure to COVID-19 and provide necessary medical care. The patient (and/or legal guardian) has also been advised to contact this office for worsening conditions or problems, and seek emergency medical treatment and/or call 911 if deemed necessary. This note was completed by using the assistance of a speech-recognition program. However, inadvertent computerized transcription errors may be present. Although every effort was made to ensure accuracy, no guarantees can be provided that every mistake has been identified and corrected by editing.   Electronically signed by Alan Welch MD on 9/22/21 at 3:49 PM EDT

## 2021-10-22 DIAGNOSIS — Z79.4 TYPE 2 DIABETES MELLITUS WITH DIABETIC POLYNEUROPATHY, WITH LONG-TERM CURRENT USE OF INSULIN (HCC): ICD-10-CM

## 2021-10-22 DIAGNOSIS — E11.42 TYPE 2 DIABETES MELLITUS WITH DIABETIC POLYNEUROPATHY, WITH LONG-TERM CURRENT USE OF INSULIN (HCC): ICD-10-CM

## 2021-10-25 NOTE — TELEPHONE ENCOUNTER
Please Approve or Refuse.   Send to Pharmacy per Pt's Request:      Next Visit Date:  12/22/2021   Last Visit Date: 9/22/2021    Hemoglobin A1C (%)   Date Value   06/19/2021 7.8 (H)   01/18/2021 11.4 (H)   06/02/2020 7.7 (H)             ( goal A1C is < 7)   BP Readings from Last 3 Encounters:   06/21/21 120/74   02/26/20 132/84   09/02/19 (!) 153/89          (goal 120/80)  BUN   Date Value Ref Range Status   09/20/2021 10 6 - 20 mg/dL Final     CREATININE   Date Value Ref Range Status   09/20/2021 0.79 0.50 - 0.90 mg/dL Final     Potassium   Date Value Ref Range Status   09/20/2021 3.9 3.7 - 5.3 mmol/L Final

## 2021-11-20 DIAGNOSIS — Z79.4 TYPE 2 DIABETES MELLITUS WITH DIABETIC POLYNEUROPATHY, WITH LONG-TERM CURRENT USE OF INSULIN (HCC): ICD-10-CM

## 2021-11-20 DIAGNOSIS — F41.9 ANXIETY: ICD-10-CM

## 2021-11-20 DIAGNOSIS — E11.42 DIABETIC POLYNEUROPATHY ASSOCIATED WITH TYPE 2 DIABETES MELLITUS (HCC): ICD-10-CM

## 2021-11-20 DIAGNOSIS — E78.2 MIXED HYPERLIPIDEMIA: ICD-10-CM

## 2021-11-20 DIAGNOSIS — E11.42 TYPE 2 DIABETES MELLITUS WITH DIABETIC POLYNEUROPATHY, WITH LONG-TERM CURRENT USE OF INSULIN (HCC): ICD-10-CM

## 2021-11-20 DIAGNOSIS — F33.41 RECURRENT MAJOR DEPRESSIVE DISORDER, IN PARTIAL REMISSION (HCC): ICD-10-CM

## 2021-11-22 DIAGNOSIS — E11.42 TYPE 2 DIABETES MELLITUS WITH DIABETIC POLYNEUROPATHY, WITH LONG-TERM CURRENT USE OF INSULIN (HCC): ICD-10-CM

## 2021-11-22 DIAGNOSIS — Z79.4 TYPE 2 DIABETES MELLITUS WITH DIABETIC POLYNEUROPATHY, WITH LONG-TERM CURRENT USE OF INSULIN (HCC): ICD-10-CM

## 2021-11-22 RX ORDER — GLYBURIDE 5 MG/1
TABLET ORAL
Qty: 30 TABLET | Refills: 3 | Status: SHIPPED | OUTPATIENT
Start: 2021-11-22 | End: 2022-06-16

## 2021-11-22 RX ORDER — FENOFIBRATE 145 MG/1
TABLET, COATED ORAL
Qty: 30 TABLET | Refills: 3 | Status: SHIPPED | OUTPATIENT
Start: 2021-11-22 | End: 2022-08-11

## 2021-11-22 RX ORDER — CITALOPRAM 40 MG/1
TABLET ORAL
Qty: 30 TABLET | Refills: 2 | Status: SHIPPED | OUTPATIENT
Start: 2021-11-22 | End: 2022-04-08

## 2021-11-22 RX ORDER — SITAGLIPTIN 100 MG/1
TABLET, FILM COATED ORAL
Qty: 90 TABLET | Refills: 1 | Status: SHIPPED | OUTPATIENT
Start: 2021-11-22 | End: 2021-11-22 | Stop reason: SDUPTHER

## 2021-11-24 ENCOUNTER — TELEPHONE (OUTPATIENT)
Dept: FAMILY MEDICINE CLINIC | Age: 36
End: 2021-11-24

## 2021-11-24 DIAGNOSIS — Z79.4 TYPE 2 DIABETES MELLITUS WITH DIABETIC POLYNEUROPATHY, WITH LONG-TERM CURRENT USE OF INSULIN (HCC): ICD-10-CM

## 2021-11-24 DIAGNOSIS — E11.42 TYPE 2 DIABETES MELLITUS WITH DIABETIC POLYNEUROPATHY, WITH LONG-TERM CURRENT USE OF INSULIN (HCC): ICD-10-CM

## 2021-11-24 RX ORDER — INSULIN GLARGINE 100 [IU]/ML
INJECTION, SOLUTION SUBCUTANEOUS
Qty: 15 ML | Refills: 2 | Status: SHIPPED | OUTPATIENT
Start: 2021-11-24 | End: 2022-02-22

## 2021-11-24 NOTE — TELEPHONE ENCOUNTER
NIKO CALLED INSTATING THE INSULIN SHE HAS BEEN USING insulin glargine (LANTUS SOLOSTAR) 100 UNIT/ML injection pen NEEDS REFILLED BUT STATES HE DIRECTIONS ARE WRONG. STATES SHE IS SUPPOSED TO BE INJECTING 55 UNITS IN THE AM AND 55 UNITS PM. CURRENTLY IT STATES   Sig: inject 50 units subcutaneously twice a day    01/27/2021 ENCOUNTER IT DOES SPECIFY THIS. SHOULD SHE CONTINUE WITH 55 UNITS OR DO ONLY 50? SHE WILL NEED REFILLED I DID PEND THE LAST RX PLEASE ADJUST IF NEEDED.

## 2022-02-19 ENCOUNTER — HOSPITAL ENCOUNTER (OUTPATIENT)
Age: 37
Discharge: HOME | End: 2022-02-19
Payer: COMMERCIAL

## 2022-02-19 DIAGNOSIS — Z79.4 TYPE 2 DIABETES MELLITUS WITH DIABETIC POLYNEUROPATHY, WITH LONG-TERM CURRENT USE OF INSULIN (HCC): ICD-10-CM

## 2022-02-19 DIAGNOSIS — E11.42 TYPE 2 DIABETES MELLITUS WITH DIABETIC POLYNEUROPATHY, WITH LONG-TERM CURRENT USE OF INSULIN (HCC): ICD-10-CM

## 2022-02-19 DIAGNOSIS — D72.829 LEUKOCYTOSIS, UNSPECIFIED TYPE: ICD-10-CM

## 2022-02-19 LAB
ABSOLUTE EOS #: 0.1 K/UL (ref 0–0.4)
ABSOLUTE IMMATURE GRANULOCYTE: ABNORMAL K/UL (ref 0–0.3)
ABSOLUTE LYMPH #: 2 K/UL (ref 1–4.8)
ABSOLUTE MONO #: 0.6 K/UL (ref 0.1–1.3)
BASOPHILS # BLD: 1 % (ref 0–2)
BASOPHILS ABSOLUTE: 0 K/UL (ref 0–0.2)
CREATININE URINE: 30.7 MG/DL (ref 28–217)
DIFFERENTIAL TYPE: ABNORMAL
EOSINOPHILS RELATIVE PERCENT: 1 % (ref 0–4)
HCT VFR BLD CALC: 47.6 % (ref 36–46)
HEMOGLOBIN: 15.8 G/DL (ref 12–16)
IMMATURE GRANULOCYTES: ABNORMAL %
LYMPHOCYTES # BLD: 23 % (ref 24–44)
MCH RBC QN AUTO: 32.2 PG (ref 26–34)
MCHC RBC AUTO-ENTMCNC: 33.2 G/DL (ref 31–37)
MCV RBC AUTO: 96.9 FL (ref 80–100)
MICROALBUMIN/CREAT 24H UR: <12 MG/L
MICROALBUMIN/CREAT UR-RTO: NORMAL MCG/MG CREAT
MONOCYTES # BLD: 7 % (ref 1–7)
NRBC AUTOMATED: ABNORMAL PER 100 WBC
PDW BLD-RTO: 13.5 % (ref 11.5–14.9)
PLATELET # BLD: 204 K/UL (ref 150–450)
PLATELET ESTIMATE: ABNORMAL
PMV BLD AUTO: 11 FL (ref 6–12)
RBC # BLD: 4.91 M/UL (ref 4–5.2)
RBC # BLD: ABNORMAL 10*6/UL
SEG NEUTROPHILS: 68 % (ref 36–66)
SEGMENTED NEUTROPHILS ABSOLUTE COUNT: 6.1 K/UL (ref 1.3–9.1)
WBC # BLD: 8.8 K/UL (ref 3.5–11)
WBC # BLD: ABNORMAL 10*3/UL

## 2022-02-19 PROCEDURE — 82570 ASSAY OF URINE CREATININE: CPT

## 2022-02-19 PROCEDURE — 36415 COLL VENOUS BLD VENIPUNCTURE: CPT

## 2022-02-19 PROCEDURE — 85025 COMPLETE CBC W/AUTO DIFF WBC: CPT

## 2022-02-19 PROCEDURE — 82043 UR ALBUMIN QUANTITATIVE: CPT

## 2022-02-19 PROCEDURE — 83036 HEMOGLOBIN GLYCOSYLATED A1C: CPT

## 2022-02-20 LAB
ESTIMATED AVERAGE GLUCOSE: 272 MG/DL
HBA1C MFR BLD: 11.1 % (ref 4–6)

## 2022-02-21 ENCOUNTER — OFFICE VISIT (OUTPATIENT)
Dept: FAMILY MEDICINE CLINIC | Age: 37
End: 2022-02-21
Payer: COMMERCIAL

## 2022-02-21 VITALS
HEART RATE: 103 BPM | HEIGHT: 64 IN | WEIGHT: 212 LBS | SYSTOLIC BLOOD PRESSURE: 126 MMHG | TEMPERATURE: 97.8 F | OXYGEN SATURATION: 98 % | DIASTOLIC BLOOD PRESSURE: 80 MMHG | BODY MASS INDEX: 36.19 KG/M2

## 2022-02-21 DIAGNOSIS — I10 ESSENTIAL HYPERTENSION: ICD-10-CM

## 2022-02-21 DIAGNOSIS — E11.42 DIABETIC POLYNEUROPATHY ASSOCIATED WITH TYPE 2 DIABETES MELLITUS (HCC): ICD-10-CM

## 2022-02-21 DIAGNOSIS — Z23 ENCOUNTER FOR IMMUNIZATION: ICD-10-CM

## 2022-02-21 DIAGNOSIS — E11.42 TYPE 2 DIABETES MELLITUS WITH DIABETIC POLYNEUROPATHY, WITH LONG-TERM CURRENT USE OF INSULIN (HCC): Primary | ICD-10-CM

## 2022-02-21 DIAGNOSIS — Z79.4 TYPE 2 DIABETES MELLITUS WITH DIABETIC POLYNEUROPATHY, WITH LONG-TERM CURRENT USE OF INSULIN (HCC): Primary | ICD-10-CM

## 2022-02-21 DIAGNOSIS — E78.2 MIXED HYPERLIPIDEMIA: ICD-10-CM

## 2022-02-21 PROCEDURE — 99214 OFFICE O/P EST MOD 30 MIN: CPT | Performed by: FAMILY MEDICINE

## 2022-02-21 PROCEDURE — G8417 CALC BMI ABV UP PARAM F/U: HCPCS | Performed by: FAMILY MEDICINE

## 2022-02-21 PROCEDURE — G8427 DOCREV CUR MEDS BY ELIG CLIN: HCPCS | Performed by: FAMILY MEDICINE

## 2022-02-21 PROCEDURE — 2022F DILAT RTA XM EVC RTNOPTHY: CPT | Performed by: FAMILY MEDICINE

## 2022-02-21 PROCEDURE — 4004F PT TOBACCO SCREEN RCVD TLK: CPT | Performed by: FAMILY MEDICINE

## 2022-02-21 PROCEDURE — G8484 FLU IMMUNIZE NO ADMIN: HCPCS | Performed by: FAMILY MEDICINE

## 2022-02-21 PROCEDURE — 3046F HEMOGLOBIN A1C LEVEL >9.0%: CPT | Performed by: FAMILY MEDICINE

## 2022-02-21 RX ORDER — INSULIN HUMAN 100 [IU]/ML
INJECTION, SUSPENSION SUBCUTANEOUS
Qty: 10 PEN | Refills: 3 | Status: SHIPPED | OUTPATIENT
Start: 2022-02-21 | End: 2022-07-20

## 2022-02-21 ASSESSMENT — ENCOUNTER SYMPTOMS
CHEST TIGHTNESS: 0
ABDOMINAL DISTENTION: 0
PHOTOPHOBIA: 0
BACK PAIN: 0
WHEEZING: 0
COUGH: 0
SHORTNESS OF BREATH: 0
CONSTIPATION: 0
SINUS PRESSURE: 0
NAUSEA: 0
COLOR CHANGE: 0
VOMITING: 0
ABDOMINAL PAIN: 0

## 2022-02-21 ASSESSMENT — PATIENT HEALTH QUESTIONNAIRE - PHQ9
SUM OF ALL RESPONSES TO PHQ QUESTIONS 1-9: 9
9. THOUGHTS THAT YOU WOULD BE BETTER OFF DEAD, OR OF HURTING YOURSELF: 0
10. IF YOU CHECKED OFF ANY PROBLEMS, HOW DIFFICULT HAVE THESE PROBLEMS MADE IT FOR YOU TO DO YOUR WORK, TAKE CARE OF THINGS AT HOME, OR GET ALONG WITH OTHER PEOPLE: 1
7. TROUBLE CONCENTRATING ON THINGS, SUCH AS READING THE NEWSPAPER OR WATCHING TELEVISION: 0
SUM OF ALL RESPONSES TO PHQ QUESTIONS 1-9: 9
1. LITTLE INTEREST OR PLEASURE IN DOING THINGS: 0
3. TROUBLE FALLING OR STAYING ASLEEP: 0
SUM OF ALL RESPONSES TO PHQ QUESTIONS 1-9: 9
SUM OF ALL RESPONSES TO PHQ9 QUESTIONS 1 & 2: 3
8. MOVING OR SPEAKING SO SLOWLY THAT OTHER PEOPLE COULD HAVE NOTICED. OR THE OPPOSITE, BEING SO FIGETY OR RESTLESS THAT YOU HAVE BEEN MOVING AROUND A LOT MORE THAN USUAL: 0
2. FEELING DOWN, DEPRESSED OR HOPELESS: 3
6. FEELING BAD ABOUT YOURSELF - OR THAT YOU ARE A FAILURE OR HAVE LET YOURSELF OR YOUR FAMILY DOWN: 0
5. POOR APPETITE OR OVEREATING: 3
4. FEELING TIRED OR HAVING LITTLE ENERGY: 3
SUM OF ALL RESPONSES TO PHQ QUESTIONS 1-9: 9

## 2022-02-21 NOTE — PROGRESS NOTES
Chief Complaint   Patient presents with    Diabetes    Other     pt wanted to wait to check with her insurance before she got her vaccine          Isabela Snide  here today for follow up on chronic medical problems, go over labs and/or diagnostic studies, and medication refills. Diabetes and Other (pt wanted to wait to check with her insurance before she got her vaccine )      HPI: Patient is follow-up for chronic medical problems including diabetes hyperlipidemia. Diabetes A1c has, but after switching insulin Lantus, A1c increased to 11.6. Patient  reports her sugars are running high after switching Lantus to next insulin because of the insurance. She takes 55 units twice daily and is also on oral hypoglycemic agents. Her previous A1c was 7.4. Patient reports she was also taking Ozempic but insurance is not covering that was also helping her blood sugars. Hyperlipidemia stable on recent blood work discussed with patient. Hypertension controlled, denies any chest pain shortness of breath      Diabetic polyneuropathy, on Neurontin multiple medications. /80   Pulse 103   Temp 97.8 °F (36.6 °C)   Ht 5' 3.6\" (1.615 m)   Wt 212 lb (96.2 kg)   LMP 01/31/2022   SpO2 98%   BMI 36.85 kg/m²    Body mass index is 36.85 kg/m². Wt Readings from Last 3 Encounters:   02/21/22 212 lb (96.2 kg)   06/21/21 212 lb (96.2 kg)   06/01/20 192 lb (87.1 kg)        []Negative depression screening. PHQ Scores 2/21/2022 12/28/2021 9/21/2021 6/21/2021 9/16/2020 2/3/2017   PHQ2 Score 3 0 0 2 0 0   PHQ9 Score 9 0 0 2 0 0      []1-4 = Minimal depression   [x]5-9 = Milddepression   []10-14 = Moderate depression   []15-19 = Moderately severe depression   []20-27 = Severe depression    Discussed testing with the patient and all questions fully answered.     Hospital Outpatient Visit on 02/19/2022   Component Date Value Ref Range Status    Hemoglobin A1C 02/19/2022 11.1* 4.0 - 6.0 % Final    Estimated Avg Glucose 02/19/2022 272  mg/dL Final    Comment: The ADA and AACC recommend providing the estimated average glucose result to permit better   patient understanding of their HBA1c result.  WBC 02/19/2022 8.8  3.5 - 11.0 k/uL Final    RBC 02/19/2022 4.91  4.0 - 5.2 m/uL Final    Hemoglobin 02/19/2022 15.8  12.0 - 16.0 g/dL Final    Hematocrit 02/19/2022 47.6* 36 - 46 % Final    MCV 02/19/2022 96.9  80 - 100 fL Final    MCH 02/19/2022 32.2  26 - 34 pg Final    MCHC 02/19/2022 33.2  31 - 37 g/dL Final    RDW 02/19/2022 13.5  11.5 - 14.9 % Final    Platelets 88/09/6107 204  150 - 450 k/uL Final    MPV 02/19/2022 11.0  6.0 - 12.0 fL Final    NRBC Automated 02/19/2022 NOT REPORTED  per 100 WBC Final    Differential Type 02/19/2022 NOT REPORTED   Final    Seg Neutrophils 02/19/2022 68* 36 - 66 % Final    Lymphocytes 02/19/2022 23* 24 - 44 % Final    Monocytes 02/19/2022 7  1 - 7 % Final    Eosinophils % 02/19/2022 1  0 - 4 % Final    Basophils 02/19/2022 1  0 - 2 % Final    Immature Granulocytes 02/19/2022 NOT REPORTED  0 % Final    Segs Absolute 02/19/2022 6.10  1.3 - 9.1 k/uL Final    Absolute Lymph # 02/19/2022 2.00  1.0 - 4.8 k/uL Final    Absolute Mono # 02/19/2022 0.60  0.1 - 1.3 k/uL Final    Absolute Eos # 02/19/2022 0.10  0.0 - 0.4 k/uL Final    Basophils Absolute 02/19/2022 0.00  0.0 - 0.2 k/uL Final    Absolute Immature Granulocyte 02/19/2022 NOT REPORTED  0.00 - 0.30 k/uL Final    WBC Morphology 02/19/2022 NOT REPORTED   Final    RBC Morphology 02/19/2022 NOT REPORTED   Final    Platelet Estimate 28/21/2836 NOT REPORTED   Final    Microalb, Ur 02/19/2022 <12  <21 mg/L Final    Creatinine, Ur 02/19/2022 30.7  28.0 - 217.0 mg/dL Final    Microalb/Crt.  Ratio 02/19/2022 Can not be calculated  <25 mcg/mg creat Final         Most recent labs reviewed:     Lab Results   Component Value Date    WBC 8.8 02/19/2022    HGB 15.8 02/19/2022    HCT 47.6 (H) 02/19/2022    MCV 96.9 02/19/2022     02/19/2022       @BRIEFLAB(NA,K,CL,CO2,BUN,CREATININE,GLUCOSE,CALCIUM)@     Lab Results   Component Value Date    ALT 27 09/20/2021    AST 26 09/20/2021    ALKPHOS 151 (H) 09/20/2021    BILITOT 0.22 (L) 09/20/2021       Lab Results   Component Value Date    TSH 0.74 03/31/2020       Lab Results   Component Value Date    CHOL 167 01/18/2021    CHOL 150 04/05/2016    CHOL 231 06/04/2015     Lab Results   Component Value Date    TRIG 454 (H) 01/18/2021    TRIG 228 04/05/2016    TRIG 271 06/04/2015     Lab Results   Component Value Date    HDL 25 (L) 01/18/2021    HDL 36 (L) 03/31/2020    HDL 26 (A) 04/05/2016     Lab Results   Component Value Date    LDLCALC 78 04/05/2016    LDLCALC 150 06/04/2015    LDLCHOLESTEROL      01/18/2021    LDLCHOLESTEROL 171 (H) 03/31/2020     Lab Results   Component Value Date    VLDL NOT REPORTED 01/18/2021    VLDL NOT REPORTED (H) 03/31/2020    VLDL 46 04/05/2016     Lab Results   Component Value Date    CHOLHDLRATIO 6.7 (H) 01/18/2021    CHOLHDLRATIO 6.7 (H) 03/31/2020    CHOLHDLRATIO 5.8 04/05/2016       Lab Results   Component Value Date    LABA1C 11.1 (H) 02/19/2022       No results found for: IFUNRMEV54    No results found for: FOLATE    No results found for: IRON, TIBC, FERRITIN    No results found for: VITD25          Current Outpatient Medications   Medication Sig Dispense Refill    Insulin NPH Isophane & Regular (HUMULIN 70/30 KWIKPEN) (70-30) 100 UNIT per ML injection pen Take 70  units bid 10 pen 3    SITagliptin (JANUVIA) 100 MG tablet Take 1 tablet by mouth daily take 1 tablet by mouth once daily 90 tablet 0    glyBURIDE (DIABETA) 5 MG tablet take 1 tablet by mouth daily WITH BREAKFAST 30 tablet 3    citalopram (CELEXA) 40 MG tablet take 1 tablet by mouth once daily 30 tablet 2    fenofibrate (TRICOR) 145 MG tablet take 1 tablet by mouth once daily 30 tablet 3    Insulin Pen Needle 31G X 8 MM MISC 1 each by Does not apply route daily 100 each 1    busPIRone (BUSPAR) 10 MG tablet take 1 tablet by mouth three times a day 90 tablet 0    atorvastatin (LIPITOR) 40 MG tablet take 1 tablet by mouth once daily 90 tablet 3    Insulin Pen Needle 29G X 12.7MM MISC 1 each by Does not apply route daily To use with insulin 120 each 1    ibuprofen (ADVIL;MOTRIN) 800 MG tablet Take 1 tablet by mouth every 8 hours as needed for Pain 20 tablet 0    B-D UF III MINI PEN NEEDLES 31G X 5 MM MISC Inject 1 each into the skin 2 times daily 100 each 1    glucose blood VI test strips (ONE TOUCH ULTRA TEST) strip TEST twice a day 100 strip 2    Lancets MISC One Touch. Patient is to test blood sugars twice a day. 100 each 2    insulin glargine (LANTUS SOLOSTAR) 100 UNIT/ML injection pen inject 55 units subcutaneously twice a day 15 mL 2    ciclopirox (PENLAC) 8 % solution Apply topically nightly. Apply to adjacent skin and affected nails daily . Remove with alcohol every 7 days. TOTAL OF 3 MO. (Patient not taking: Reported on 2/21/2022) 6 mL 1    aspirin EC 81 MG EC tablet Take 1 tablet by mouth daily (Patient not taking: Reported on 9/21/2021) 90 tablet 1     No current facility-administered medications for this visit. Social History     Socioeconomic History    Marital status:      Spouse name: Not on file    Number of children: Not on file    Years of education: Not on file    Highest education level: Not on file   Occupational History    Not on file   Tobacco Use    Smoking status: Current Every Day Smoker     Packs/day: 2.00     Years: 23.00     Pack years: 46.00     Types: Cigarettes    Smokeless tobacco: Never Used   Substance and Sexual Activity    Alcohol use:  Yes     Alcohol/week: 0.0 standard drinks    Drug use: Not Currently     Comment: previous IV heroin abuse; clean since 10/26/2012    Sexual activity: Not on file   Other Topics Concern    Not on file   Social History Narrative    Not on file     Social Determinants of Health Financial Resource Strain: Low Risk     Difficulty of Paying Living Expenses: Not hard at all   Food Insecurity: No Food Insecurity    Worried About Running Out of Food in the Last Year: Never true    Perry of Food in the Last Year: Never true   Transportation Needs: No Transportation Needs    Lack of Transportation (Medical): No    Lack of Transportation (Non-Medical): No   Physical Activity:     Days of Exercise per Week: Not on file    Minutes of Exercise per Session: Not on file   Stress:     Feeling of Stress : Not on file   Social Connections:     Frequency of Communication with Friends and Family: Not on file    Frequency of Social Gatherings with Friends and Family: Not on file    Attends Jehovah's witness Services: Not on file    Active Member of [address] [place] [address] [employer] or Organizations: Not on file    Attends Club or Organization Meetings: Not on file    Marital Status: Not on file   Intimate Partner Violence:     Fear of Current or Ex-Partner: Not on file    Emotionally Abused: Not on file    Physically Abused: Not on file    Sexually Abused: Not on file   Housing Stability: Unknown    Unable to Pay for Housing in the Last Year: No    Number of [place] in the Last Year: Not on file    Unstable Housing in the Last Year: No     Ready to quit: Not Answered  Counseling given: Yes        Family History   Problem Relation Age of Onset    Other Mother         non alcoholic cirrhorsis liver-stage 4,fatty liver    Diabetes Mother     High Blood Pressure Mother     High Cholesterol Mother     Depression Father     Anxiety Disorder Father     High Blood Pressure Father     High Cholesterol Father     Diabetes Father     Diabetes Paternal Grandmother              -rest of complaints with corresponding details per ROS    The patient's past medical, surgical, social, and family history as well as her current medications and allergies were reviewed as documented intoday's encounter.         Review of Systems   Constitutional: Negative for activity change, appetite change and unexpected weight change. HENT: Negative for congestion, nosebleeds, sinus pressure and tinnitus. Eyes: Negative for photophobia and visual disturbance. Respiratory: Negative for cough, chest tightness, shortness of breath and wheezing. Cardiovascular: Negative for chest pain, palpitations and leg swelling. Gastrointestinal: Negative for abdominal distention, abdominal pain, constipation, nausea and vomiting. Endocrine: Negative for polyuria. Genitourinary: Negative for difficulty urinating, flank pain, frequency, hematuria, urgency and vaginal pain. Musculoskeletal: Negative for back pain, myalgias, neck pain and neck stiffness. Skin: Negative for color change and wound. Neurological: Positive for numbness. Negative for dizziness, speech difficulty, weakness and headaches. Psychiatric/Behavioral: Negative for agitation, behavioral problems, decreased concentration, dysphoric mood, hallucinations and sleep disturbance. The patient is nervous/anxious. The patient is not hyperactive. Physical Exam    PHYSICAL EXAM:   VITALS:   Vitals:    02/21/22 1450   BP: 126/80   Pulse: 103   Temp: 97.8 °F (36.6 °C)   SpO2: 98%     GENERAL:  Patient is a well-developed, well-nourished female  in no acute distress, alert and oriented x3, appropriate and pleasant conversation. HEAD: Normocephalic, atraumatic. EYES: Pupils equal, round and reactive to light and accommodation, extraocular   movements intact. ENT: Moist mucous membranes. No erythema is noted. NECK: Supple. No masses. No lymphadenopathy. CARDIOVASCULAR: Regular rate and rhythm. PULMONARY: Lungs are clear to auscultation bilaterally. ABDOMEN: Soft, nontender, nondistended. Positive bowel sounds. MUSCULOSKELETAL: Strength 5/5 bilaterally in all extremities. No tenderness to   palpation of the ribs, long bones, or spine.    NEUROLOGIC: Cranial nerves II through XII grossly intact. No focal deficits are noted. ASSESSMENT AND PLAN      1. Type 2 diabetes mellitus with diabetic polyneuropathy, with long-term current use of insulin (HCC)  Uncontrolled increase insulin to 70 units discussed if sugars do not improve can switch back to Lantus will require authorization.  - Insulin NPH Isophane & Regular (HUMULIN 70/30 KWIKPEN) (70-30) 100 UNIT per ML injection pen; Take 70  units bid  Dispense: 10 pen; Refill: 3    2. Mixed hyperlipidemia  Controlled continue statins    3. Essential hypertension  Continue same medications monitor blood pressure at home    4. Diabetic polyneuropathy associated with type 2 diabetes mellitus (HCC)  Continue Neurontin    5. Encounter for immunization  Pneumonia vaccine given      No orders of the defined types were placed in this encounter. Medications Discontinued During This Encounter   Medication Reason    Insulin NPH Isophane & Regular (HUMULIN 70/30 KWIKPEN) (70-30) 100 UNIT per ML injection pen        Yolis Doan received counseling on the following healthy behaviors: nutrition, exercise and medication adherence  Reviewed prior labs and health maintenance  Continue current medications, diet and exercise. Discussed use, benefit, and side effects of prescribed medications. Barriers to medication compliance addressed. Patient given educational materials - see patient instructions  Was a self-tracking handout given in paper form or via [employer]? Yes    Requested Prescriptions     Signed Prescriptions Disp Refills    Insulin NPH Isophane & Regular (HUMULIN 70/30 KWIKPEN) (70-30) 100 UNIT per ML injection pen 10 pen 3     Sig: Take 70  units bid       All patient questions answered. Patient voiced understanding. Quality Measures    Body mass index is 36.85 kg/m². Elevated. Weight control planned discussed daily exercise regimen and Healthy diet and regular exercise.     BP: 126/80 Blood pressure is normal. Treatment plan consists of Weight Reduction, DASH Eating Plan, Dietary Sodium Restriction, Increased Physical Activity and No treatment change needed. Lab Results   Component Value Date    LDLCALC 78 04/05/2016    LDLCHOLESTEROL      01/18/2021    LDLDIRECT 89 01/18/2021    (goal LDL reduction with dx if diabetes is 50% LDL reduction)      PHQ Scores 2/21/2022 12/28/2021 9/21/2021 6/21/2021 9/16/2020 2/3/2017   PHQ2 Score 3 0 0 2 0 0   PHQ9 Score 9 0 0 2 0 0     Interpretation of Total Score Depression Severity: 1-4 = Minimal depression, 5-9 = Mild depression, 10-14 = Moderate depression, 15-19 = Moderately severe depression, 20-27 = Severe depression    The patient'spast medical, surgical, social, and family history as well as her   current medications and allergies were reviewed as documented in today's encounter. Medications, labs, diagnostic studies, consultations andfollow-up as documented in this encounter. Return in about 3 months (around 5/21/2022) for dm ,htn, hld. Patient wasseen with total face to face time of 30 minutes. More than 50% of this visit was counseling and education. Future Appointments   Date Time Provider Alex Carl   6/2/2022  2:45 PM Corrinne Burows, MD fp [facility]     This note was completed by using the assistance of a speech-recognition program. However, inadvertent computerized transcription errors may be present. Althoughevery effort was made to ensure accuracy, no guarantees can be provided that every mistake has been identified and corrected by editing.   Electronically signed by Corrinne Burows, MD on 2/21/2022  9:27 PM

## 2022-02-21 NOTE — PROGRESS NOTES
Visit Information    Have you changed or started any medications since your last visit including any over-the-counter medicines, vitamins, or herbal medicines? no   Are you having any side effects from any of your medications? -  no  Have you stopped taking any of your medications? Is so, why? -  no    Have you seen any other physician or provider since your last visit? No  Have you had any other diagnostic tests since your last visit? No  Have you been seen in the emergency room and/or had an admission to a hospital since we last saw you? No  Have you had your routine dental cleaning in the past 6 months? no    Have you activated your [employer] account? If not, what are your barriers?  Yes     Patient Care Team:  Prem Dey MD as PCP - General (Family Medicine)  Prem Dey MD as PCP - [facility]    Medical History Review  Past Medical, Family, and Social History reviewed and does contribute to the patient presenting condition    Health Maintenance   Topic Date Due    Hepatitis C screen  Never done    Pneumococcal 0-64 years Vaccine (1 of 2 - PPSV23) Never done    Hepatitis B vaccine (1 of 3 - Risk 3-dose series) Never done    DTaP/Tdap/Td vaccine (1 - Tdap) Never done    Diabetic retinal exam  06/10/2016    Cervical cancer screen  06/16/2018    Flu vaccine (1) 09/01/2021    Lipid screen  01/18/2022    COVID-19 Vaccine (3 - Booster for Pfizer series) 02/01/2022    A1C test (Diabetic or Prediabetic)  05/19/2022    Diabetic foot exam  06/21/2022    Potassium monitoring  09/20/2022    Creatinine monitoring  09/20/2022    Depression Monitoring  12/28/2022    Diabetic microalbuminuria test  02/19/2023    HIV screen  Completed    Hepatitis A vaccine  Aged Out    Hib vaccine  Aged Out    Meningococcal (ACWY) vaccine  Aged Out    Varicella vaccine  Discontinued

## 2022-02-22 RX ORDER — ASPIRIN 81 MG/1
81 TABLET ORAL DAILY
Qty: 90 TABLET | Refills: 1 | Status: SHIPPED | OUTPATIENT
Start: 2022-02-22

## 2022-03-17 DIAGNOSIS — E11.42 TYPE 2 DIABETES MELLITUS WITH DIABETIC POLYNEUROPATHY, WITH LONG-TERM CURRENT USE OF INSULIN (HCC): Primary | ICD-10-CM

## 2022-03-17 DIAGNOSIS — Z79.4 TYPE 2 DIABETES MELLITUS WITH DIABETIC POLYNEUROPATHY, WITH LONG-TERM CURRENT USE OF INSULIN (HCC): Primary | ICD-10-CM

## 2022-03-17 PROCEDURE — 3046F HEMOGLOBIN A1C LEVEL >9.0%: CPT | Performed by: FAMILY MEDICINE

## 2022-03-17 RX ORDER — SITAGLIPTIN AND METFORMIN HYDROCHLORIDE 1000; 50 MG/1; MG/1
1 TABLET, FILM COATED ORAL 2 TIMES DAILY WITH MEALS
Qty: 60 TABLET | Refills: 5 | Status: SHIPPED | OUTPATIENT
Start: 2022-03-17

## 2022-04-07 DIAGNOSIS — F33.41 RECURRENT MAJOR DEPRESSIVE DISORDER, IN PARTIAL REMISSION (HCC): ICD-10-CM

## 2022-04-07 DIAGNOSIS — F41.9 ANXIETY: ICD-10-CM

## 2022-04-07 NOTE — TELEPHONE ENCOUNTER
Last visit: 02/21/2022  Last Med refill: 03/01/2022  Does patient have enough medication for 72 hours: No:     Next Visit Date:  Future Appointments   Date Time Provider Alex Meseret   6/2/2022  2:45 PM Zenia Lyn MD fp sc [address] Maintenance   Topic Date Due    Hepatitis C screen  Never done    Pneumococcal 0-64 years Vaccine (1 of 2 - PPSV23) Never done    Hepatitis B vaccine (1 of 3 - Risk 3-dose series) Never done    DTaP/Tdap/Td vaccine (1 - Tdap) Never done    Diabetic retinal exam  06/10/2016    Cervical cancer screen  06/16/2018    Lipid screen  01/18/2022    COVID-19 Vaccine (3 - Booster for Pfizer series) 02/01/2022    A1C test (Diabetic or Prediabetic)  05/19/2022    Diabetic foot exam  06/21/2022    Flu vaccine (Season Ended) 09/01/2022    Potassium monitoring  09/20/2022    Creatinine monitoring  09/20/2022    Diabetic microalbuminuria test  02/19/2023    Depression Monitoring  02/21/2023    HIV screen  Completed    Hepatitis A vaccine  Aged Out    Hib vaccine  Aged Out    Meningococcal (ACWY) vaccine  Aged Out    Varicella vaccine  Discontinued       Hemoglobin A1C (%)   Date Value   02/19/2022 11.1 (H)   06/19/2021 7.8 (H)   01/18/2021 11.4 (H)             ( goal A1C is < 7)   Microalb/Crt.  Ratio (mcg/mg creat)   Date Value   02/19/2022 Can not be calculated     LDL Cholesterol (mg/dL)   Date Value   01/18/2021 03/31/2020 171 (H)     LDL Calculated (mg/dL)   Date Value   04/05/2016 78   06/04/2015 150       (goal LDL is <100)   AST (U/L)   Date Value   09/20/2021 26     ALT (U/L)   Date Value   09/20/2021 27     BUN (mg/dL)   Date Value   09/20/2021 10     BP Readings from Last 3 Encounters:   02/21/22 126/80   06/21/21 120/74   02/26/20 132/84          (goal 120/80)    All Future Testing planned in [facility]              Patient Active Problem List:     Type 2 diabetes mellitus with diabetic polyneuropathy, with long-term current use of insulin ([address] Hyperlipidemia     Diabetic polyneuropathy associated with type 2 diabetes mellitus ([facility] [address]     Essential hypertension     Depression     Left ankle swelling     Class 1 obesity due to excess calories with serious comorbidity and body mass index (BMI) of 33.0 to 33.9 in adult     Onychomycosis     Leukocytosis

## 2022-04-08 RX ORDER — CITALOPRAM 40 MG/1
TABLET ORAL
Qty: 30 TABLET | Refills: 2 | Status: SHIPPED | OUTPATIENT
Start: 2022-04-08 | End: 2022-07-11

## 2022-05-20 DIAGNOSIS — E11.42 TYPE 2 DIABETES MELLITUS WITH DIABETIC POLYNEUROPATHY, WITH LONG-TERM CURRENT USE OF INSULIN (HCC): ICD-10-CM

## 2022-05-20 DIAGNOSIS — Z79.4 TYPE 2 DIABETES MELLITUS WITH DIABETIC POLYNEUROPATHY, WITH LONG-TERM CURRENT USE OF INSULIN (HCC): ICD-10-CM

## 2022-06-16 DIAGNOSIS — E11.42 TYPE 2 DIABETES MELLITUS WITH DIABETIC POLYNEUROPATHY, WITH LONG-TERM CURRENT USE OF INSULIN (HCC): ICD-10-CM

## 2022-06-16 DIAGNOSIS — E11.42 DIABETIC POLYNEUROPATHY ASSOCIATED WITH TYPE 2 DIABETES MELLITUS (HCC): ICD-10-CM

## 2022-06-16 DIAGNOSIS — Z79.4 TYPE 2 DIABETES MELLITUS WITH DIABETIC POLYNEUROPATHY, WITH LONG-TERM CURRENT USE OF INSULIN (HCC): ICD-10-CM

## 2022-06-16 RX ORDER — GLYBURIDE 5 MG/1
TABLET ORAL
Qty: 30 TABLET | Refills: 3 | Status: SHIPPED | OUTPATIENT
Start: 2022-06-16

## 2022-07-11 DIAGNOSIS — F33.41 RECURRENT MAJOR DEPRESSIVE DISORDER, IN PARTIAL REMISSION (HCC): ICD-10-CM

## 2022-07-11 DIAGNOSIS — F41.9 ANXIETY: ICD-10-CM

## 2022-07-11 RX ORDER — CITALOPRAM 40 MG/1
TABLET ORAL
Qty: 30 TABLET | Refills: 2 | Status: SHIPPED | OUTPATIENT
Start: 2022-07-11

## 2022-07-20 DIAGNOSIS — Z79.4 TYPE 2 DIABETES MELLITUS WITH DIABETIC POLYNEUROPATHY, WITH LONG-TERM CURRENT USE OF INSULIN (HCC): ICD-10-CM

## 2022-07-20 DIAGNOSIS — E11.42 TYPE 2 DIABETES MELLITUS WITH DIABETIC POLYNEUROPATHY, WITH LONG-TERM CURRENT USE OF INSULIN (HCC): ICD-10-CM

## 2022-07-20 RX ORDER — INSULIN HUMAN 100 [IU]/ML
INJECTION, SUSPENSION SUBCUTANEOUS
Qty: 30 ML | Refills: 0 | Status: SHIPPED | OUTPATIENT
Start: 2022-07-20 | End: 2022-08-22

## 2022-07-20 NOTE — TELEPHONE ENCOUNTER
Please Approve or Refuse.   Send to Pharmacy per Pt's Request:      Next Visit Date:  9/7/2022   Last Visit Date: 2/21/2022    Hemoglobin A1C (%)   Date Value   02/19/2022 11.1 (H)   06/19/2021 7.8 (H)   01/18/2021 11.4 (H)             ( goal A1C is < 7)   BP Readings from Last 3 Encounters:   02/21/22 126/80   06/21/21 120/74   02/26/20 132/84          (goal 120/80)  BUN   Date Value Ref Range Status   09/20/2021 10 6 - 20 mg/dL Final     CREATININE   Date Value Ref Range Status   09/20/2021 0.79 0.50 - 0.90 mg/dL Final     Potassium   Date Value Ref Range Status   09/20/2021 3.9 3.7 - 5.3 mmol/L Final

## 2022-08-10 DIAGNOSIS — E78.2 MIXED HYPERLIPIDEMIA: ICD-10-CM

## 2022-08-11 RX ORDER — FENOFIBRATE 145 MG/1
TABLET, COATED ORAL
Qty: 30 TABLET | Refills: 0 | Status: SHIPPED | OUTPATIENT
Start: 2022-08-11 | End: 2022-09-06

## 2022-08-11 RX ORDER — ATORVASTATIN CALCIUM 40 MG/1
TABLET, FILM COATED ORAL
Qty: 90 TABLET | Refills: 0 | Status: SHIPPED | OUTPATIENT
Start: 2022-08-11

## 2022-08-21 DIAGNOSIS — E11.42 TYPE 2 DIABETES MELLITUS WITH DIABETIC POLYNEUROPATHY, WITH LONG-TERM CURRENT USE OF INSULIN (HCC): ICD-10-CM

## 2022-08-21 DIAGNOSIS — Z79.4 TYPE 2 DIABETES MELLITUS WITH DIABETIC POLYNEUROPATHY, WITH LONG-TERM CURRENT USE OF INSULIN (HCC): ICD-10-CM

## 2022-08-22 RX ORDER — INSULIN HUMAN 100 [IU]/ML
INJECTION, SUSPENSION SUBCUTANEOUS
Qty: 30 ML | Refills: 0 | Status: SHIPPED | OUTPATIENT
Start: 2022-08-22 | End: 2022-09-21 | Stop reason: SDUPTHER

## 2022-09-03 DIAGNOSIS — E78.2 MIXED HYPERLIPIDEMIA: ICD-10-CM

## 2022-09-06 RX ORDER — FENOFIBRATE 145 MG/1
TABLET, COATED ORAL
Qty: 30 TABLET | Refills: 0 | Status: SHIPPED | OUTPATIENT
Start: 2022-09-06 | End: 2022-10-03

## 2022-09-21 DIAGNOSIS — E11.42 TYPE 2 DIABETES MELLITUS WITH DIABETIC POLYNEUROPATHY, WITH LONG-TERM CURRENT USE OF INSULIN (HCC): ICD-10-CM

## 2022-09-21 DIAGNOSIS — Z79.4 TYPE 2 DIABETES MELLITUS WITH DIABETIC POLYNEUROPATHY, WITH LONG-TERM CURRENT USE OF INSULIN (HCC): ICD-10-CM

## 2022-09-22 RX ORDER — INSULIN HUMAN 100 [IU]/ML
INJECTION, SUSPENSION SUBCUTANEOUS
Qty: 30 ML | Refills: 0 | Status: SHIPPED | OUTPATIENT
Start: 2022-09-22 | End: 2022-10-23 | Stop reason: SDUPTHER

## 2022-09-22 NOTE — TELEPHONE ENCOUNTER
Boy Russo is calling to request a refill on the following medication(s):    Medication Request:  Requested Prescriptions     Pending Prescriptions Disp Refills    Insulin NPH Isophane & Regular (HUMULIN 70/30 KWIKPEN) (70-30) 100 UNIT per ML injection pen 30 mL 0       Last Visit Date (If Applicable):  9/43/2366    Next Visit Date:    Visit date not found

## 2022-10-01 DIAGNOSIS — E78.2 MIXED HYPERLIPIDEMIA: ICD-10-CM

## 2022-10-03 RX ORDER — FENOFIBRATE 145 MG/1
TABLET, COATED ORAL
Qty: 30 TABLET | Refills: 0 | Status: SHIPPED | OUTPATIENT
Start: 2022-10-03 | End: 2022-10-23 | Stop reason: SDUPTHER

## 2022-10-23 DIAGNOSIS — E11.42 TYPE 2 DIABETES MELLITUS WITH DIABETIC POLYNEUROPATHY, WITH LONG-TERM CURRENT USE OF INSULIN (HCC): ICD-10-CM

## 2022-10-23 DIAGNOSIS — Z79.4 TYPE 2 DIABETES MELLITUS WITH DIABETIC POLYNEUROPATHY, WITH LONG-TERM CURRENT USE OF INSULIN (HCC): ICD-10-CM

## 2022-10-23 DIAGNOSIS — E78.2 MIXED HYPERLIPIDEMIA: ICD-10-CM

## 2022-10-24 RX ORDER — FENOFIBRATE 145 MG/1
145 TABLET, COATED ORAL DAILY
Qty: 90 TABLET | Refills: 2 | Status: SHIPPED | OUTPATIENT
Start: 2022-10-24

## 2022-10-24 RX ORDER — INSULIN HUMAN 100 [IU]/ML
INJECTION, SUSPENSION SUBCUTANEOUS
Qty: 30 ML | Refills: 0 | Status: SHIPPED | OUTPATIENT
Start: 2022-10-24 | End: 2022-11-22

## 2022-11-17 DIAGNOSIS — E11.42 TYPE 2 DIABETES MELLITUS WITH DIABETIC POLYNEUROPATHY, WITH LONG-TERM CURRENT USE OF INSULIN (HCC): ICD-10-CM

## 2022-11-17 DIAGNOSIS — Z79.4 TYPE 2 DIABETES MELLITUS WITH DIABETIC POLYNEUROPATHY, WITH LONG-TERM CURRENT USE OF INSULIN (HCC): ICD-10-CM

## 2022-11-17 DIAGNOSIS — E11.42 DIABETIC POLYNEUROPATHY ASSOCIATED WITH TYPE 2 DIABETES MELLITUS (HCC): ICD-10-CM

## 2022-11-17 RX ORDER — GLYBURIDE 5 MG/1
TABLET ORAL
Qty: 30 TABLET | Refills: 3 | Status: SHIPPED | OUTPATIENT
Start: 2022-11-17 | End: 2022-11-22

## 2022-11-17 NOTE — TELEPHONE ENCOUNTER
Please Approve or Refuse.   Send to Pharmacy per Pt's Request:      Next Visit Date:  11/22/2022   Last Visit Date: 2/21/2022    Hemoglobin A1C (%)   Date Value   02/19/2022 11.1 (H)   06/19/2021 7.8 (H)   01/18/2021 11.4 (H)             ( goal A1C is < 7)   BP Readings from Last 3 Encounters:   02/21/22 126/80   06/21/21 120/74   02/26/20 132/84          (goal 120/80)  BUN   Date Value Ref Range Status   09/20/2021 10 6 - 20 mg/dL Final     Creatinine   Date Value Ref Range Status   09/20/2021 0.79 0.50 - 0.90 mg/dL Final     Potassium   Date Value Ref Range Status   09/20/2021 3.9 3.7 - 5.3 mmol/L Final

## 2022-11-22 ENCOUNTER — OFFICE VISIT (OUTPATIENT)
Dept: FAMILY MEDICINE CLINIC | Age: 37
End: 2022-11-22
Payer: COMMERCIAL

## 2022-11-22 VITALS
HEIGHT: 63 IN | OXYGEN SATURATION: 98 % | SYSTOLIC BLOOD PRESSURE: 124 MMHG | TEMPERATURE: 97.3 F | DIASTOLIC BLOOD PRESSURE: 72 MMHG | HEART RATE: 86 BPM | BODY MASS INDEX: 38.48 KG/M2 | WEIGHT: 217.2 LBS

## 2022-11-22 DIAGNOSIS — E11.42 DIABETIC POLYNEUROPATHY ASSOCIATED WITH TYPE 2 DIABETES MELLITUS (HCC): ICD-10-CM

## 2022-11-22 DIAGNOSIS — E55.9 VITAMIN D DEFICIENCY: ICD-10-CM

## 2022-11-22 DIAGNOSIS — Z11.59 ENCOUNTER FOR SCREENING FOR OTHER VIRAL DISEASES: ICD-10-CM

## 2022-11-22 DIAGNOSIS — E66.09 CLASS 1 OBESITY DUE TO EXCESS CALORIES WITH SERIOUS COMORBIDITY AND BODY MASS INDEX (BMI) OF 33.0 TO 33.9 IN ADULT: ICD-10-CM

## 2022-11-22 DIAGNOSIS — Z79.4 TYPE 2 DIABETES MELLITUS WITH DIABETIC POLYNEUROPATHY, WITH LONG-TERM CURRENT USE OF INSULIN (HCC): Primary | ICD-10-CM

## 2022-11-22 DIAGNOSIS — I10 ESSENTIAL HYPERTENSION: ICD-10-CM

## 2022-11-22 DIAGNOSIS — E78.2 MIXED HYPERLIPIDEMIA: ICD-10-CM

## 2022-11-22 DIAGNOSIS — E11.42 TYPE 2 DIABETES MELLITUS WITH DIABETIC POLYNEUROPATHY, WITH LONG-TERM CURRENT USE OF INSULIN (HCC): Primary | ICD-10-CM

## 2022-11-22 LAB — HBA1C MFR BLD: 11.3 %

## 2022-11-22 PROCEDURE — 2022F DILAT RTA XM EVC RTNOPTHY: CPT | Performed by: FAMILY MEDICINE

## 2022-11-22 PROCEDURE — 3078F DIAST BP <80 MM HG: CPT | Performed by: FAMILY MEDICINE

## 2022-11-22 PROCEDURE — 3046F HEMOGLOBIN A1C LEVEL >9.0%: CPT | Performed by: FAMILY MEDICINE

## 2022-11-22 PROCEDURE — 99214 OFFICE O/P EST MOD 30 MIN: CPT | Performed by: FAMILY MEDICINE

## 2022-11-22 PROCEDURE — G8417 CALC BMI ABV UP PARAM F/U: HCPCS | Performed by: FAMILY MEDICINE

## 2022-11-22 PROCEDURE — 4004F PT TOBACCO SCREEN RCVD TLK: CPT | Performed by: FAMILY MEDICINE

## 2022-11-22 PROCEDURE — G8427 DOCREV CUR MEDS BY ELIG CLIN: HCPCS | Performed by: FAMILY MEDICINE

## 2022-11-22 PROCEDURE — 83036 HEMOGLOBIN GLYCOSYLATED A1C: CPT | Performed by: FAMILY MEDICINE

## 2022-11-22 PROCEDURE — 3074F SYST BP LT 130 MM HG: CPT | Performed by: FAMILY MEDICINE

## 2022-11-22 PROCEDURE — G8484 FLU IMMUNIZE NO ADMIN: HCPCS | Performed by: FAMILY MEDICINE

## 2022-11-22 RX ORDER — INSULIN HUMAN 100 [IU]/ML
INJECTION, SUSPENSION SUBCUTANEOUS
Qty: 30 ML | Refills: 0 | Status: SHIPPED | OUTPATIENT
Start: 2022-11-22

## 2022-11-22 RX ORDER — GLYBURIDE 5 MG/1
TABLET ORAL
Qty: 180 TABLET | Refills: 1 | Status: SHIPPED | OUTPATIENT
Start: 2022-11-22

## 2022-11-22 RX ORDER — ASPIRIN 81 MG/1
81 TABLET ORAL DAILY
Qty: 90 TABLET | Refills: 1 | Status: SHIPPED | OUTPATIENT
Start: 2022-11-22

## 2022-11-22 RX ORDER — GABAPENTIN 300 MG/1
300 CAPSULE ORAL 3 TIMES DAILY
Qty: 90 CAPSULE | Refills: 0 | Status: SHIPPED | OUTPATIENT
Start: 2022-11-22 | End: 2022-12-22

## 2022-11-22 ASSESSMENT — ENCOUNTER SYMPTOMS
SHORTNESS OF BREATH: 0
CHEST TIGHTNESS: 0
NAUSEA: 0
BACK PAIN: 0
STRIDOR: 0
WHEEZING: 0
SORE THROAT: 0
VOMITING: 0
COUGH: 0
RECTAL PAIN: 0
EYE REDNESS: 0
RHINORRHEA: 0
CONSTIPATION: 0
DIARRHEA: 0
BLOOD IN STOOL: 0
COLOR CHANGE: 0
SINUS PRESSURE: 0
ABDOMINAL PAIN: 0
ABDOMINAL DISTENTION: 0
TROUBLE SWALLOWING: 0

## 2022-11-22 NOTE — PATIENT INSTRUCTIONS
New Updates for [facility] MyChart/ [employer] [facility] [facility] BASIM    Thank you for choosing [facility] to give you the best care! [facility] [facility] [facility] is always trying to think of new ways to help their patients. We are asking all patients to try out the new digital registration that is now available through your [facility] account or the new BASIM, [employer] [facility] [facility] Via the basim you're now able to update your personal and registration information prior to your upcoming appointment. This will save you time once you arrive at the office to check-in, not to mention your information remains safe!! Many other perks come from signing up for an account, such as:  Requesting refills  Scheduling an appointment  Completing an E-Visit  Sending a message to the office/provider  Having access to your medication list  Paying your bill/copay prior to your appointment  Scheduling your yearly mammogram  Review your test results    If you are not familiar with [facility] or the [employer] [facility] [facility] BASIM, please ask one of us and we will be happy to answer any questions or help you set-up your account.       Your [facility] office,  Rajinder

## 2022-11-22 NOTE — PROGRESS NOTES
Chief Complaint   Patient presents with    Diabetes     A1c:pending     Peripheral Neuropathy     In feet and hands //PAIN SCORE: 7/10         Sarah Garcia  here today for follow up on chronic medical problems, go over labs and/or diagnostic studies, and medication refills. Diabetes (A1c:pending ) and Peripheral Neuropathy (In feet and hands //PAIN SCORE: 7/10)      HPI: Patient is scheduled for follow-up on chronic medical conditions including diabetes. Diabetes uncontrolled A1c is 9.3 since last 1 year patient has missed multiple appointments. Patient reports she was busy with her mom who passed away. Patient reports she is not watching her diet and drinks pop her sugars are running more than 300 in the morning she is currently on insulin mixed 70 units twice a day and also on Januvia and glimepiride. Patient cannot tolerate metformin. Patient reports Januvia and insulin is expensive. She was before on Lantus which was discontinued by insurance. Patient reports she is also not watching her diet has not seen ophthalmologist.      Hyperlipidemia uncontrolled no recent blood work she is on statins has stopped taking aspirin. The 10-year CVD risk score (D'Agostino, et al., 2008) is: 10.5%    Values used to calculate the score:      Age: 42 years      Sex: Female      Diabetic: Yes      Tobacco smoker: Yes      Systolic Blood Pressure: 608 mmHg      Is BP treated: No      HDL Cholesterol: 25 mg/dL      Total Cholesterol: 167 mg/dL      Hypertension controlled patient not on any medications. Currently patient is on diet control takes low-salt diet and home monitoring. Patient has diabetic neuropathy reported getting worse since last 1 year. Patient was taking gabapentin in the past did not refill. Her sugars have improved but again they are getting worse.     Obesity worsening patient has gained weight, she was started on Trulicity and Victoza reports she could not tolerate that and that did not help her losing weight. Patient stopped that medication also she was on Ozempic. Patient reports she drinks pop every day. Vitamin D deficiency not on any supplements no recent blood work. /72   Pulse 86   Temp 97.3 °F (36.3 °C)   Ht 5' 3\" (1.6 m)   Wt 217 lb 3.2 oz (98.5 kg)   SpO2 98%   BMI 38.48 kg/m²    Body mass index is 38.48 kg/m². Wt Readings from Last 3 Encounters:   11/22/22 217 lb 3.2 oz (98.5 kg)   02/21/22 212 lb (96.2 kg)   06/21/21 212 lb (96.2 kg)        []Negative depression screening. PHQ Scores 2/21/2022 12/28/2021 9/21/2021 6/21/2021 9/16/2020 2/3/2017   PHQ2 Score 3 0 0 2 0 0   PHQ9 Score 9 0 0 2 0 0      []1-4 = Minimal depression   [x]5-9 = Milddepression   []10-14 = Moderate depression   []15-19 = Moderately severe depression   []20-27 = Severe depression    Discussed testing with the patient and all questions fully answered. Hospital Outpatient Visit on 02/19/2022   Component Date Value Ref Range Status    Hemoglobin A1C 02/19/2022 11.1 (A)  4.0 - 6.0 % Final    Estimated Avg Glucose 02/19/2022 272  mg/dL Final    Comment: The ADA and AACC recommend providing the estimated average glucose result to permit better   patient understanding of their HBA1c result.       WBC 02/19/2022 8.8  3.5 - 11.0 k/uL Final    RBC 02/19/2022 4.91  4.0 - 5.2 m/uL Final    Hemoglobin 02/19/2022 15.8  12.0 - 16.0 g/dL Final    Hematocrit 02/19/2022 47.6 (A)  36 - 46 % Final    MCV 02/19/2022 96.9  80 - 100 fL Final    MCH 02/19/2022 32.2  26 - 34 pg Final    MCHC 02/19/2022 33.2  31 - 37 g/dL Final    RDW 02/19/2022 13.5  11.5 - 14.9 % Final    Platelets 45/02/3596 204  150 - 450 k/uL Final    MPV 02/19/2022 11.0  6.0 - 12.0 fL Final    NRBC Automated 02/19/2022 NOT REPORTED  per 100 WBC Final    Differential Type 02/19/2022 NOT REPORTED   Final    Seg Neutrophils 02/19/2022 68 (A)  36 - 66 % Final    Lymphocytes 02/19/2022 23 (A)  24 - 44 % Final    Monocytes 02/19/2022 7  1 - 7 % Final    Eosinophils % 02/19/2022 1  0 - 4 % Final    Basophils 02/19/2022 1  0 - 2 % Final    Immature Granulocytes 02/19/2022 NOT REPORTED  0 % Final    Segs Absolute 02/19/2022 6.10  1.3 - 9.1 k/uL Final    Absolute Lymph # 02/19/2022 2.00  1.0 - 4.8 k/uL Final    Absolute Mono # 02/19/2022 0.60  0.1 - 1.3 k/uL Final    Absolute Eos # 02/19/2022 0.10  0.0 - 0.4 k/uL Final    Basophils Absolute 02/19/2022 0.00  0.0 - 0.2 k/uL Final    Absolute Immature Granulocyte 02/19/2022 NOT REPORTED  0.00 - 0.30 k/uL Final    WBC Morphology 02/19/2022 NOT REPORTED   Final    RBC Morphology 02/19/2022 NOT REPORTED   Final    Platelet Estimate 41/88/0569 NOT REPORTED   Final    Microalb, Ur 02/19/2022 <12  <21 mg/L Final    Creatinine, Ur 02/19/2022 30.7  28.0 - 217.0 mg/dL Final    Microalb/Crt.  Ratio 02/19/2022 Can not be calculated  <25 mcg/mg creat Final         Most recent labs reviewed:     Lab Results   Component Value Date    WBC 8.8 02/19/2022    HGB 15.8 02/19/2022    HCT 47.6 (H) 02/19/2022    MCV 96.9 02/19/2022     02/19/2022       @BRIEFLAB(NA,K,CL,CO2,BUN,CREATININE,GLUCOSE,CALCIUM)@     Lab Results   Component Value Date    ALT 27 09/20/2021    AST 26 09/20/2021    ALKPHOS 151 (H) 09/20/2021    BILITOT 0.22 (L) 09/20/2021       Lab Results   Component Value Date    TSH 0.74 03/31/2020       Lab Results   Component Value Date    CHOL 167 01/18/2021    CHOL 150 04/05/2016    CHOL 231 06/04/2015     Lab Results   Component Value Date    TRIG 454 (H) 01/18/2021    TRIG 228 04/05/2016    TRIG 271 06/04/2015     Lab Results   Component Value Date    HDL 25 (L) 01/18/2021    HDL 36 (L) 03/31/2020    HDL 26 (A) 04/05/2016     Lab Results   Component Value Date    LDLCALC 78 04/05/2016    LDLCALC 150 06/04/2015    LDLCHOLESTEROL      01/18/2021    LDLCHOLESTEROL 171 (H) 03/31/2020     Lab Results   Component Value Date    VLDL NOT REPORTED 01/18/2021    VLDL NOT REPORTED (H) 03/31/2020    VLDL 46 04/05/2016 Lab Results   Component Value Date    CHOLHDLRATIO 6.7 (H) 01/18/2021    CHOLHDLRATIO 6.7 (H) 03/31/2020    CHOLHDLRATIO 5.8 04/05/2016       Lab Results   Component Value Date    LABA1C 11.3 11/22/2022       No results found for: EFAWLMHX12    No results found for: FOLATE    No results found for: IRON, TIBC, FERRITIN    No results found for: VITD25          Current Outpatient Medications   Medication Sig Dispense Refill    glyBURIDE (DIABETA) 5 MG tablet TAKE 1 TAB TWICE / tablet 1    dapagliflozin (FARXIGA) 10 MG tablet Take 1 tablet by mouth every morning 30 tablet 5    Insulin NPH Isophane & Regular (HUMULIN 70/30 KWIKPEN) (70-30) 100 UNIT per ML injection pen inject 75 units subcutaneously twice a day 30 mL 0    aspirin EC 81 MG EC tablet Take 1 tablet by mouth daily 90 tablet 1    gabapentin (NEURONTIN) 300 MG capsule Take 1 capsule by mouth 3 times daily for 30 days. 90 capsule 0    fenofibrate (TRICOR) 145 MG tablet Take 1 tablet by mouth daily take 1 tablet by mouth once daily 90 tablet 2    atorvastatin (LIPITOR) 40 MG tablet take 1 tablet by mouth once daily 90 tablet 0    citalopram (CELEXA) 40 MG tablet take 1 tablet by mouth once daily 30 tablet 2    Insulin Pen Needle 29G X 12.7MM MISC 1 each by Does not apply route daily To use with insulin 120 each 1    Insulin Pen Needle 31G X 8 MM MISC 1 each by Does not apply route daily 100 each 1    ibuprofen (ADVIL;MOTRIN) 800 MG tablet Take 1 tablet by mouth every 8 hours as needed for Pain 20 tablet 0    B-D UF III MINI PEN NEEDLES 31G X 5 MM MISC Inject 1 each into the skin 2 times daily 100 each 1    glucose blood VI test strips (ONE TOUCH ULTRA TEST) strip TEST twice a day 100 strip 2    Lancets MISC One Touch. Patient is to test blood sugars twice a day.  100 each 2    busPIRone (BUSPAR) 10 MG tablet take 1 tablet by mouth three times a day (Patient not taking: Reported on 11/22/2022) 90 tablet 0     No current facility-administered medications for this visit. Social History     Socioeconomic History    Marital status:      Spouse name: Not on file    Number of children: Not on file    Years of education: Not on file    Highest education level: Not on file   Occupational History    Not on file   Tobacco Use    Smoking status: Every Day     Packs/day: 2.00     Years: 23.00     Pack years: 46.00     Types: Cigarettes    Smokeless tobacco: Never   Substance and Sexual Activity    Alcohol use: Yes     Alcohol/week: 0.0 standard drinks    Drug use: Not Currently     Comment: previous IV heroin abuse; clean since 10/26/2012    Sexual activity: Not on file   Other Topics Concern    Not on file   Social History Narrative    Not on file     Social Determinants of Health     Financial Resource Strain: Not on file   Food Insecurity: Not on file   Transportation Needs: Not on file   Physical Activity: Not on file   Stress: Not on file   Social Connections: Not on file   Intimate Partner Violence: Not on file   Housing Stability: Not on file     Ready to quit: Not Answered  Counseling given: Not Answered        Family History   Problem Relation Age of Onset    Other Mother         non alcoholic cirrhorsis liver-stage 4,fatty liver    Diabetes Mother     High Blood Pressure Mother     High Cholesterol Mother     Depression Father     Anxiety Disorder Father     High Blood Pressure Father     High Cholesterol Father     Diabetes Father     Diabetes Paternal Grandmother              -rest of complaints with corresponding details per ROS    The patient's past medical, surgical, social, and family history as well as her current medications and allergies were reviewed as documented intoday's encounter. Review of Systems   Constitutional:  Negative for activity change, appetite change, fatigue, fever and unexpected weight change.    HENT:  Negative for congestion, ear pain, postnasal drip, rhinorrhea, sinus pressure, sore throat and trouble swallowing. Eyes:  Negative for redness and visual disturbance. Respiratory:  Negative for cough, chest tightness, shortness of breath, wheezing and stridor. Cardiovascular:  Negative for chest pain, palpitations and leg swelling. Gastrointestinal:  Negative for abdominal distention, abdominal pain, blood in stool, constipation, diarrhea, nausea, rectal pain and vomiting. Endocrine: Positive for polydipsia and polyphagia. Negative for polyuria. Genitourinary:  Negative for difficulty urinating, flank pain, frequency and urgency. Musculoskeletal:  Positive for arthralgias and joint swelling. Negative for back pain, gait problem, myalgias and neck pain. Skin:  Negative for color change, rash and wound. Allergic/Immunologic: Positive for immunocompromised state. Negative for food allergies. Neurological:  Positive for weakness and numbness. Negative for dizziness, speech difficulty, light-headedness and headaches. Psychiatric/Behavioral:  Positive for decreased concentration. Negative for agitation, behavioral problems, dysphoric mood, hallucinations, sleep disturbance and suicidal ideas. The patient is nervous/anxious. Physical Exam  Vitals and nursing note reviewed. Constitutional:       General: She is not in acute distress. Appearance: Normal appearance. She is well-developed. She is obese. She is not diaphoretic. HENT:      Head: Normocephalic and atraumatic. Nose: Nose normal.   Eyes:      General:         Right eye: No discharge. Left eye: No discharge. Extraocular Movements: Extraocular movements intact. Conjunctiva/sclera: Conjunctivae normal.      Pupils: Pupils are equal, round, and reactive to light. Neck:      Thyroid: No thyromegaly. Cardiovascular:      Rate and Rhythm: Normal rate and regular rhythm. Pulses:           Dorsalis pedis pulses are 3+ on the right side and 3+ on the left side. Heart sounds: Normal heart sounds.  No murmur heard. Pulmonary:      Effort: Pulmonary effort is normal. No respiratory distress. Breath sounds: Normal breath sounds. No wheezing or rhonchi. Abdominal:      General: Bowel sounds are normal. There is no distension. Palpations: Abdomen is soft. There is no mass. Tenderness: There is no abdominal tenderness. There is no right CVA tenderness, left CVA tenderness, guarding or rebound. Musculoskeletal:         General: No tenderness. Cervical back: Normal range of motion and neck supple. No rigidity or spasms. Thoracic back: No spasms. Normal range of motion. Lumbar back: Spasms present. Normal range of motion. Right lower leg: No deformity. No edema. Left lower leg: No edema. Right foot: No deformity or foot drop. Left foot: No deformity or foot drop. Feet:      Right foot:      Protective Sensation: 5 sites tested. 3 sites sensed. Skin integrity: Skin integrity normal. No callus. Toenail Condition: Right toenails are abnormally thick. Fungal disease present. Left foot:      Protective Sensation: 5 sites tested. 4 sites sensed. Skin integrity: Skin integrity normal. No callus. Toenail Condition: Left toenails are abnormally thick. Fungal disease present. Lymphadenopathy:      Cervical: No cervical adenopathy. Skin:     Coloration: Skin is not jaundiced or pale. Findings: No bruising or erythema. Neurological:      General: No focal deficit present. Mental Status: She is alert and oriented to person, place, and time. Cranial Nerves: No cranial nerve deficit. Sensory: Sensory deficit present. Motor: No weakness or tremor. Coordination: Coordination normal.      Gait: Gait and tandem walk normal.      Deep Tendon Reflexes: Reflexes are normal and symmetric. Psychiatric:         Attention and Perception: Attention and perception normal. She is attentive. Mood and Affect: Mood is anxious. Mood is not depressed. Affect is not tearful. Speech: She is communicative. Speech is not rapid and pressured, delayed or slurred. Behavior: Behavior normal. Behavior is not agitated or slowed. Thought Content: Thought content normal.         Judgment: Judgment normal.           ASSESSMENT AND PLAN      1. Type 2 diabetes mellitus with diabetic polyneuropathy, with long-term current use of insulin (HCC)  Uncontrolled, discussed with patient in detail and had to watch her diet, encourage to stop drinking pop on cut down. Increase glimepiride to twice daily can increase up to 20 mg if needed, increase insulin to 75 units, discontinue Januvia due to cost of medication, start on Farxiga if covered by insurance. Monitor blood sugars follow-up in 3 months  - POCT glycosylated hemoglobin (Hb A1C)  - Comprehensive Metabolic Panel; Future  - CBC with Auto Differential; Future  - Microalbumin / Creatinine Urine Ratio; Future  - Urinalysis with Reflex to Culture; Future  - TSH; Future  - glyBURIDE (DIABETA) 5 MG tablet; TAKE 1 TAB TWICE /DAY  Dispense: 180 tablet; Refill: 1  - dapagliflozin (FARXIGA) 10 MG tablet; Take 1 tablet by mouth every morning  Dispense: 30 tablet; Refill: 5  - Insulin NPH Isophane & Regular (HUMULIN 70/30 KWIKPEN) (70-30) 100 UNIT per ML injection pen; inject 75 units subcutaneously twice a day  Dispense: 30 mL; Refill: 0  - aspirin EC 81 MG EC tablet; Take 1 tablet by mouth daily  Dispense: 90 tablet; Refill: 1  - gabapentin (NEURONTIN) 300 MG capsule; Take 1 capsule by mouth 3 times daily for 30 days. Dispense: 90 capsule; Refill: 0  - [facility] DIABETES FOOT EXAM    2. Mixed hyperlipidemia  Recheck lipid panel continue statins  Encourage low-carb low-fat diet  - CBC with Auto Differential; Future  - Lipid Panel; Future    3. Essential hypertension  Controlled not on any medications continue to monitor  Continue low-salt diet    4.  Diabetic polyneuropathy associated with type 2 diabetes mellitus ([facility] [address]  Worsening of neuropathy due to uncontrolled diabetes, start on gabapentin 300 mg medication contract renewed. - glyBURIDE (DIABETA) 5 MG tablet; TAKE 1 TAB TWICE /DAY  Dispense: 180 tablet; Refill: 1    5. Class 1 obesity due to excess calories with serious comorbidity and body mass index (BMI) of 33.0 to 33.9 in adult  Worsening patient is not willing to try other medications. We will continue to monitor. Encourage about changing diet. 6. Vitamin D deficiency  Recheck vitamin D  - Vitamin D 25 Hydroxy; Future    7. Encounter for screening for other viral diseases    - Hepatitis C Antibody; Future      Orders Placed This Encounter   Procedures    Comprehensive Metabolic Panel     Fasting 8 hrs     Standing Status:   Future     Standing Expiration Date:   11/22/2023    CBC with Auto Differential     Standing Status:   Future     Standing Expiration Date:   11/23/2023    Lipid Panel     Standing Status:   Future     Standing Expiration Date:   11/22/2023     Order Specific Question:   Is Patient Fasting?/# of Hours     Answer:   yes, 8-10 hours    Microalbumin / Creatinine Urine Ratio     Standing Status:   Future     Standing Expiration Date:   11/22/2023    Vitamin D 25 Hydroxy     Standing Status:   Future     Standing Expiration Date:   11/22/2023    Urinalysis with Reflex to Culture     Standing Status:   Future     Standing Expiration Date:   11/22/2023     Order Specific Question:   SPECIFY(EX-CATH,MIDSTREAM,CYSTO,ETC)?      Answer:   midstream    TSH     Standing Status:   Future     Standing Expiration Date:   11/22/2023    Hepatitis C Antibody     Standing Status:   Future     Standing Expiration Date:   11/22/2023    POCT glycosylated hemoglobin (Hb A1C)     DIABETES FOOT EXAM         Medications Discontinued During This Encounter   Medication Reason    sitaGLIPtan-metFORMIN (JANUMET)  MG per tablet Cost of medication    insulin glargine (LANTUS SOLOSTAR) 100 UNIT/ML injection pen Therapy completed    SITagliptin (JANUVIA) 100 MG tablet Cost of medication    Insulin NPH Isophane & Regular (HUMULIN 70/30 KWIKPEN) (70-30) 100 UNIT per ML injection pen     glyBURIDE (DIABETA) 5 MG tablet     ciclopirox (PENLAC) 8 % solution LIST CLEANUP    aspirin EC 81 MG EC tablet REORDER       Lovcaesar Jesusita received counseling on the following healthy behaviors: nutrition, exercise, medication adherence, and tobacco cessation  Reviewed prior labs and health maintenance  Continue current medications, diet and exercise. Discussed use, benefit, and side effects of prescribed medications. Barriers to medication compliance addressed. Patient given educational materials - see patient instructions  Was a self-tracking handout given in paper form or via [employer]? Yes    Requested Prescriptions     Signed Prescriptions Disp Refills    glyBURIDE (DIABETA) 5 MG tablet 180 tablet 1     Sig: TAKE 1 TAB TWICE /DAY    dapagliflozin (FARXIGA) 10 MG tablet 30 tablet 5     Sig: Take 1 tablet by mouth every morning    Insulin NPH Isophane & Regular (HUMULIN 70/30 KWIKPEN) (70-30) 100 UNIT per ML injection pen 30 mL 0     Sig: inject 75 units subcutaneously twice a day    aspirin EC 81 MG EC tablet 90 tablet 1     Sig: Take 1 tablet by mouth daily    gabapentin (NEURONTIN) 300 MG capsule 90 capsule 0     Sig: Take 1 capsule by mouth 3 times daily for 30 days. All patient questions answered. Patient voiced understanding. Quality Measures    Body mass index is 38.48 kg/m². Elevated. Weight control planned discussed daily exercise regimen and Healthy diet and regular exercise. BP: 124/72 Blood pressure is Normal. Treatment plan consists of Weight Reduction, DASH Eating Plan, Dietary Sodium Restriction, and No treatment change needed.     Lab Results   Component Value Date    LDLCALC 78 04/05/2016    LDLCHOLESTEROL      01/18/2021    LDLDIRECT 89 01/18/2021    (goal LDL reduction with dx if diabetes is 50% LDL reduction)      PHQ Scores 2/21/2022 12/28/2021 9/21/2021 6/21/2021 9/16/2020 2/3/2017   PHQ2 Score 3 0 0 2 0 0   PHQ9 Score 9 0 0 2 0 0     Interpretation of Total Score Depression Severity: 1-4 = Minimal depression, 5-9 = Mild depression, 10-14 = Moderate depression, 15-19 = Moderately severe depression, 20-27 = Severe depression    The patient'spast medical, surgical, social, and family history as well as her   current medications and allergies were reviewed as documented in today's encounter. Medications, labs, diagnostic studies, consultations andfollow-up as documented in this encounter. Return in about 3 months (around 2/22/2023) for 30min,always chronic conditons, dm ,htn, hld, lab work. Patient wasseen with total face to face time of 35 minutes. More than 50% of this visit was counseling and education. Future Appointments   Date Time Provider Alex Meseret   2/21/2023  9:00 AM [facility]     This note was completed by using the assistance of a speech-recognition program. However, inadvertent computerized transcription errors may be present. Althoughevery effort was made to ensure accuracy, no guarantees can be provided that every mistake has been identified and corrected by editing.   Electronically signed by Erik Bjawa MD on 11/22/2022  9:24 AM

## 2022-11-22 NOTE — PROGRESS NOTES
Visit Information    Have you changed or started any medications since your last visit including any over-the-counter medicines, vitamins, or herbal medicines? YES   Have you stopped taking any of your medications? Is so, why? -  YES  Are you having any side effects from any of your medications? - no    Have you seen any other physician or provider since your last visit?  no   Have you had any other diagnostic tests since your last visit?  no   Have you been seen in the emergency room and/or had an admission in a hospital since we last saw you?  no   Have you had your routine dental cleaning in the past 6 months?  no     Do you have an active MyChart account? If no, what is the barrier?   Yes    Patient Care Team:  Malone Mohs, MD as PCP - General (Family Medicine)  Malone Mohs, MD as PCP - [facility]    Medical History Review  Past Medical, Family, and Social History reviewed and does contribute to the patient presenting condition    Health Maintenance   Topic Date Due    Pneumococcal 0-64 years Vaccine (1 - PCV) Never done    Hepatitis C screen  Never done    Hepatitis B vaccine (1 of 3 - Risk 3-dose series) Never done    DTaP/Tdap/Td vaccine (1 - Tdap) Never done    Diabetic retinal exam  06/10/2016    Cervical cancer screen  06/16/2018    COVID-19 Vaccine (3 - Booster for Pfizer series) 10/27/2021    Lipids  01/18/2022    A1C test (Diabetic or Prediabetic)  05/19/2022    Diabetic foot exam  06/21/2022    Flu vaccine (1) 08/01/2022    Diabetic microalbuminuria test  02/19/2023    Depression Monitoring  02/21/2023    HIV screen  Completed    Hepatitis A vaccine  Aged Out    Hib vaccine  Aged Out    Meningococcal (ACWY) vaccine  Aged Out    Varicella vaccine  Discontinued

## 2022-11-26 DIAGNOSIS — E78.2 MIXED HYPERLIPIDEMIA: ICD-10-CM

## 2022-11-28 RX ORDER — ATORVASTATIN CALCIUM 40 MG/1
TABLET, FILM COATED ORAL
Qty: 90 TABLET | Refills: 3 | Status: SHIPPED | OUTPATIENT
Start: 2022-11-28

## 2022-12-03 ENCOUNTER — HOSPITAL ENCOUNTER (OUTPATIENT)
Age: 37
Setting detail: SPECIMEN
Discharge: HOME | End: 2022-12-03

## 2022-12-03 ENCOUNTER — OFFICE VISIT (OUTPATIENT)
Dept: FAMILY MEDICINE CLINIC | Age: 37
End: 2022-12-03
Payer: COMMERCIAL

## 2022-12-03 VITALS
WEIGHT: 207 LBS | OXYGEN SATURATION: 97 % | HEIGHT: 63 IN | BODY MASS INDEX: 36.68 KG/M2 | HEART RATE: 81 BPM | DIASTOLIC BLOOD PRESSURE: 81 MMHG | SYSTOLIC BLOOD PRESSURE: 124 MMHG | TEMPERATURE: 98 F

## 2022-12-03 DIAGNOSIS — R30.0 DYSURIA: Primary | ICD-10-CM

## 2022-12-03 DIAGNOSIS — R10.9 FLANK PAIN: ICD-10-CM

## 2022-12-03 DIAGNOSIS — R81 GLUCOSURIA: ICD-10-CM

## 2022-12-03 DIAGNOSIS — E11.65 UNCONTROLLED TYPE 2 DIABETES MELLITUS WITH HYPERGLYCEMIA (HCC): ICD-10-CM

## 2022-12-03 DIAGNOSIS — R30.0 DYSURIA: ICD-10-CM

## 2022-12-03 LAB
BILIRUBIN, POC: NEGATIVE
BLOOD URINE, POC: ABNORMAL
CHP ED QC CHECK: ABNORMAL
CLARITY, POC: CLEAR
COLOR, POC: YELLOW
GLUCOSE BLD-MCNC: 325 MG/DL
GLUCOSE URINE, POC: ABNORMAL
KETONES, POC: NEGATIVE
LEUKOCYTE EST, POC: NEGATIVE
NITRITE, POC: NEGATIVE
PH, POC: 5.5
PROTEIN, POC: NEGATIVE
SPECIFIC GRAVITY, POC: 1.02
UROBILINOGEN, POC: 0.2

## 2022-12-03 PROCEDURE — G8427 DOCREV CUR MEDS BY ELIG CLIN: HCPCS

## 2022-12-03 PROCEDURE — 99214 OFFICE O/P EST MOD 30 MIN: CPT

## 2022-12-03 PROCEDURE — 82962 GLUCOSE BLOOD TEST: CPT

## 2022-12-03 PROCEDURE — G8417 CALC BMI ABV UP PARAM F/U: HCPCS

## 2022-12-03 PROCEDURE — 3046F HEMOGLOBIN A1C LEVEL >9.0%: CPT

## 2022-12-03 PROCEDURE — 2022F DILAT RTA XM EVC RTNOPTHY: CPT

## 2022-12-03 PROCEDURE — 81002 URINALYSIS NONAUTO W/O SCOPE: CPT

## 2022-12-03 PROCEDURE — G8484 FLU IMMUNIZE NO ADMIN: HCPCS

## 2022-12-03 PROCEDURE — 3078F DIAST BP <80 MM HG: CPT

## 2022-12-03 PROCEDURE — 4004F PT TOBACCO SCREEN RCVD TLK: CPT

## 2022-12-03 PROCEDURE — 3074F SYST BP LT 130 MM HG: CPT

## 2022-12-03 RX ORDER — NITROFURANTOIN 25; 75 MG/1; MG/1
100 CAPSULE ORAL 2 TIMES DAILY
Qty: 14 CAPSULE | Refills: 0 | Status: SHIPPED | OUTPATIENT
Start: 2022-12-03 | End: 2022-12-10

## 2022-12-03 SDOH — ECONOMIC STABILITY: FOOD INSECURITY: WITHIN THE PAST 12 MONTHS, THE FOOD YOU BOUGHT JUST DIDN'T LAST AND YOU DIDN'T HAVE MONEY TO GET MORE.: NEVER TRUE

## 2022-12-03 SDOH — ECONOMIC STABILITY: FOOD INSECURITY: WITHIN THE PAST 12 MONTHS, YOU WORRIED THAT YOUR FOOD WOULD RUN OUT BEFORE YOU GOT MONEY TO BUY MORE.: NEVER TRUE

## 2022-12-03 ASSESSMENT — ENCOUNTER SYMPTOMS
BACK PAIN: 1
BOWEL INCONTINENCE: 0
EYE REDNESS: 0
NAUSEA: 0
ABDOMINAL PAIN: 1
EYE PAIN: 0

## 2022-12-03 ASSESSMENT — SOCIAL DETERMINANTS OF HEALTH (SDOH): HOW HARD IS IT FOR YOU TO PAY FOR THE VERY BASICS LIKE FOOD, HOUSING, MEDICAL CARE, AND HEATING?: NOT VERY HARD

## 2022-12-04 ENCOUNTER — APPOINTMENT (OUTPATIENT)
Dept: CT IMAGING | Age: 37
End: 2022-12-04
Payer: COMMERCIAL

## 2022-12-04 ENCOUNTER — HOSPITAL ENCOUNTER (EMERGENCY)
Age: 37
Discharge: HOME | End: 2022-12-04
Attending: EMERGENCY MEDICINE
Payer: COMMERCIAL

## 2022-12-04 VITALS
HEIGHT: 63 IN | SYSTOLIC BLOOD PRESSURE: 108 MMHG | BODY MASS INDEX: 36.68 KG/M2 | RESPIRATION RATE: 16 BRPM | HEART RATE: 107 BPM | WEIGHT: 207 LBS | OXYGEN SATURATION: 99 % | DIASTOLIC BLOOD PRESSURE: 72 MMHG | TEMPERATURE: 99.6 F

## 2022-12-04 DIAGNOSIS — N30.01 ACUTE CYSTITIS WITH HEMATURIA: Primary | ICD-10-CM

## 2022-12-04 LAB
ABSOLUTE BANDS #: 0.92 K/UL (ref 0–1)
ABSOLUTE EOS #: 0.18 K/UL (ref 0–0.4)
ABSOLUTE LYMPH #: 0.28 K/UL (ref 1–4.8)
ABSOLUTE MONO #: 0.46 K/UL (ref 0.1–1.3)
ALBUMIN SERPL-MCNC: 4 G/DL (ref 3.5–5.2)
ALP BLD-CCNC: 186 U/L (ref 35–104)
ALT SERPL-CCNC: 74 U/L (ref 5–33)
ANION GAP SERPL CALCULATED.3IONS-SCNC: 11 MMOL/L (ref 9–17)
AST SERPL-CCNC: 59 U/L
BACTERIA: NORMAL
BANDS: 10 % (ref 0–10)
BASOPHILS # BLD: 0 % (ref 0–2)
BASOPHILS ABSOLUTE: 0 K/UL (ref 0–0.2)
BILIRUB SERPL-MCNC: 0.7 MG/DL (ref 0.3–1.2)
BILIRUBIN URINE: ABNORMAL
BUN BLDV-MCNC: 7 MG/DL (ref 6–20)
CALCIUM SERPL-MCNC: 9.4 MG/DL (ref 8.6–10.4)
CASTS UA: NORMAL /LPF
CHLORIDE BLD-SCNC: 103 MMOL/L (ref 98–107)
CO2: 21 MMOL/L (ref 20–31)
COLOR: ABNORMAL
CREAT SERPL-MCNC: 0.54 MG/DL (ref 0.5–0.9)
CULTURE: ABNORMAL
EOSINOPHILS RELATIVE PERCENT: 2 % (ref 0–4)
EPITHELIAL CELLS UA: NORMAL /HPF
GFR SERPL CREATININE-BSD FRML MDRD: >60 ML/MIN/1.73M2
GLUCOSE BLD-MCNC: 288 MG/DL (ref 70–99)
GLUCOSE URINE: ABNORMAL
HCG(URINE) PREGNANCY TEST: NEGATIVE
HCT VFR BLD CALC: 44.4 % (ref 36–46)
HEMOGLOBIN: 15 G/DL (ref 12–16)
INFLUENZA A: NOT DETECTED
INFLUENZA B: NOT DETECTED
INR BLD: 1.1
KETONES, URINE: NEGATIVE
LACTIC ACID: 2.7 MMOL/L (ref 0.5–2.2)
LEUKOCYTE ESTERASE, URINE: ABNORMAL
LIPASE: 22 U/L (ref 13–60)
LYMPHOCYTES # BLD: 3 % (ref 24–44)
MAGNESIUM: 1.4 MG/DL (ref 1.6–2.6)
MCH RBC QN AUTO: 31.6 PG (ref 26–34)
MCHC RBC AUTO-ENTMCNC: 33.7 G/DL (ref 31–37)
MCV RBC AUTO: 93.7 FL (ref 80–100)
MONOCYTES # BLD: 5 % (ref 1–7)
MORPHOLOGY: NORMAL
NITRITE, URINE: POSITIVE
PDW BLD-RTO: 12.9 % (ref 11.5–14.9)
PH UA: 5 (ref 5–8)
PLATELET # BLD: 177 K/UL (ref 150–450)
PMV BLD AUTO: 10.7 FL (ref 6–12)
POTASSIUM SERPL-SCNC: 3.5 MMOL/L (ref 3.7–5.3)
PROTEIN UA: ABNORMAL
PROTHROMBIN TIME: 14 SEC (ref 11.8–14.6)
RBC # BLD: 4.74 M/UL (ref 4–5.2)
RBC UA: NORMAL /HPF
SARS-COV-2 RNA, RT PCR: NOT DETECTED
SEG NEUTROPHILS: 80 % (ref 36–66)
SEGMENTED NEUTROPHILS ABSOLUTE COUNT: 7.36 K/UL (ref 1.3–9.1)
SODIUM BLD-SCNC: 135 MMOL/L (ref 135–144)
SOURCE: NORMAL
SPECIFIC GRAVITY UA: 1.09 (ref 1–1.03)
SPECIMEN DESCRIPTION: ABNORMAL
SPECIMEN DESCRIPTION: NORMAL
TOTAL PROTEIN: 7.5 G/DL (ref 6.4–8.3)
TROPONIN, HIGH SENSITIVITY: <6 NG/L (ref 0–14)
TROPONIN, HIGH SENSITIVITY: <6 NG/L (ref 0–14)
TURBIDITY: CLEAR
URINE HGB: ABNORMAL
UROBILINOGEN, URINE: NORMAL
WBC # BLD: 9.2 K/UL (ref 3.5–11)
WBC UA: NORMAL /HPF

## 2022-12-04 PROCEDURE — 36415 COLL VENOUS BLD VENIPUNCTURE: CPT

## 2022-12-04 PROCEDURE — 74177 CT ABD & PELVIS W/CONTRAST: CPT

## 2022-12-04 PROCEDURE — 83735 ASSAY OF MAGNESIUM: CPT

## 2022-12-04 PROCEDURE — 96361 HYDRATE IV INFUSION ADD-ON: CPT

## 2022-12-04 PROCEDURE — 85025 COMPLETE CBC W/AUTO DIFF WBC: CPT

## 2022-12-04 PROCEDURE — 6370000000 HC RX 637 (ALT 250 FOR IP): Performed by: EMERGENCY MEDICINE

## 2022-12-04 PROCEDURE — 99285 EMERGENCY DEPT VISIT HI MDM: CPT

## 2022-12-04 PROCEDURE — 87636 SARSCOV2 & INF A&B AMP PRB: CPT

## 2022-12-04 PROCEDURE — 6360000002 HC RX W HCPCS: Performed by: EMERGENCY MEDICINE

## 2022-12-04 PROCEDURE — 96375 TX/PRO/DX INJ NEW DRUG ADDON: CPT

## 2022-12-04 PROCEDURE — 81001 URINALYSIS AUTO W/SCOPE: CPT

## 2022-12-04 PROCEDURE — 2580000003 HC RX 258: Performed by: EMERGENCY MEDICINE

## 2022-12-04 PROCEDURE — 87086 URINE CULTURE/COLONY COUNT: CPT

## 2022-12-04 PROCEDURE — 80053 COMPREHEN METABOLIC PANEL: CPT

## 2022-12-04 PROCEDURE — 6360000004 HC RX CONTRAST MEDICATION: Performed by: EMERGENCY MEDICINE

## 2022-12-04 PROCEDURE — 93005 ELECTROCARDIOGRAM TRACING: CPT | Performed by: EMERGENCY MEDICINE

## 2022-12-04 PROCEDURE — 83690 ASSAY OF LIPASE: CPT

## 2022-12-04 PROCEDURE — 83605 ASSAY OF LACTIC ACID: CPT

## 2022-12-04 PROCEDURE — 86403 PARTICLE AGGLUT ANTBDY SCRN: CPT

## 2022-12-04 PROCEDURE — 84484 ASSAY OF TROPONIN QUANT: CPT

## 2022-12-04 PROCEDURE — 81025 URINE PREGNANCY TEST: CPT

## 2022-12-04 PROCEDURE — 85610 PROTHROMBIN TIME: CPT

## 2022-12-04 PROCEDURE — 96374 THER/PROPH/DIAG INJ IV PUSH: CPT

## 2022-12-04 PROCEDURE — 72131 CT LUMBAR SPINE W/O DYE: CPT

## 2022-12-04 RX ORDER — CEPHALEXIN 500 MG/1
500 CAPSULE ORAL 4 TIMES DAILY
Qty: 40 CAPSULE | Refills: 0 | Status: SHIPPED | OUTPATIENT
Start: 2022-12-04 | End: 2022-12-14

## 2022-12-04 RX ORDER — MORPHINE SULFATE 4 MG/ML
4 INJECTION, SOLUTION INTRAMUSCULAR; INTRAVENOUS ONCE
Status: COMPLETED | OUTPATIENT
Start: 2022-12-04 | End: 2022-12-04

## 2022-12-04 RX ORDER — KETOROLAC TROMETHAMINE 30 MG/ML
30 INJECTION, SOLUTION INTRAMUSCULAR; INTRAVENOUS ONCE
Status: COMPLETED | OUTPATIENT
Start: 2022-12-04 | End: 2022-12-04

## 2022-12-04 RX ORDER — 0.9 % SODIUM CHLORIDE 0.9 %
1000 INTRAVENOUS SOLUTION INTRAVENOUS ONCE
Status: COMPLETED | OUTPATIENT
Start: 2022-12-04 | End: 2022-12-04

## 2022-12-04 RX ORDER — LANOLIN ALCOHOL/MO/W.PET/CERES
400 CREAM (GRAM) TOPICAL ONCE
Status: COMPLETED | OUTPATIENT
Start: 2022-12-04 | End: 2022-12-04

## 2022-12-04 RX ORDER — SODIUM CHLORIDE 0.9 % (FLUSH) 0.9 %
10 SYRINGE (ML) INJECTION PRN
Status: DISCONTINUED | OUTPATIENT
Start: 2022-12-04 | End: 2022-12-04 | Stop reason: HOSPADM

## 2022-12-04 RX ORDER — ONDANSETRON 4 MG/1
4 TABLET, ORALLY DISINTEGRATING ORAL EVERY 8 HOURS PRN
Qty: 20 TABLET | Refills: 0 | Status: SHIPPED | OUTPATIENT
Start: 2022-12-04

## 2022-12-04 RX ORDER — 0.9 % SODIUM CHLORIDE 0.9 %
100 INTRAVENOUS SOLUTION INTRAVENOUS ONCE
Status: COMPLETED | OUTPATIENT
Start: 2022-12-04 | End: 2022-12-04

## 2022-12-04 RX ORDER — ONDANSETRON 2 MG/ML
4 INJECTION INTRAMUSCULAR; INTRAVENOUS ONCE
Status: COMPLETED | OUTPATIENT
Start: 2022-12-04 | End: 2022-12-04

## 2022-12-04 RX ORDER — IBUPROFEN 600 MG/1
600 TABLET ORAL EVERY 6 HOURS PRN
Qty: 20 TABLET | Refills: 0 | Status: SHIPPED | OUTPATIENT
Start: 2022-12-04

## 2022-12-04 RX ADMIN — Medication 400 MG: at 15:28

## 2022-12-04 RX ADMIN — SODIUM CHLORIDE 1000 ML: 9 INJECTION, SOLUTION INTRAVENOUS at 15:28

## 2022-12-04 RX ADMIN — ONDANSETRON 4 MG: 2 INJECTION INTRAMUSCULAR; INTRAVENOUS at 12:38

## 2022-12-04 RX ADMIN — SODIUM CHLORIDE 100 ML: 9 INJECTION, SOLUTION INTRAVENOUS at 13:30

## 2022-12-04 RX ADMIN — SODIUM CHLORIDE, PRESERVATIVE FREE 10 ML: 5 INJECTION INTRAVENOUS at 13:29

## 2022-12-04 RX ADMIN — KETOROLAC TROMETHAMINE 30 MG: 30 INJECTION, SOLUTION INTRAMUSCULAR; INTRAVENOUS at 14:43

## 2022-12-04 RX ADMIN — IOPAMIDOL 75 ML: 755 INJECTION, SOLUTION INTRAVENOUS at 13:29

## 2022-12-04 RX ADMIN — SODIUM CHLORIDE 1000 ML: 9 INJECTION, SOLUTION INTRAVENOUS at 12:38

## 2022-12-04 RX ADMIN — MORPHINE SULFATE 4 MG: 4 INJECTION, SOLUTION INTRAMUSCULAR; INTRAVENOUS at 12:38

## 2022-12-04 ASSESSMENT — ENCOUNTER SYMPTOMS
BACK PAIN: 0
EYE PAIN: 0
ABDOMINAL PAIN: 1
COLOR CHANGE: 0
NAUSEA: 1
SHORTNESS OF BREATH: 0

## 2022-12-04 NOTE — ED PROVIDER NOTES
EMERGENCY DEPARTMENT ENCOUNTER    Pt Name: Nancy Gonzalez  MRN: [id number]  [place] 1985  Date of evaluation: 12/4/22  CHIEF COMPLAINT       Chief Complaint   Patient presents with    Abdominal Pain     HISTORY OF PRESENT ILLNESS   78-year-old female presents for complaints of abdominal pain and flank pain. She reports that she been having symptoms for the last approximately 2 days states that she was concern for urinary tract infection, states that she been having urinary frequency and she has a history of UTIs in the past and states that she sometimes gets lower abdominal pain with her symptoms that she has been having. She reports that she was seen in urgent care yesterday and was started on antibiotic for a UTI but reports that she has been getting worse since yesterday states she having worsening bilateral flank pain as well as lower abdominal pain. Describes the pain as dull and achy denies anything making it significantly better or worse reports that she did have a fever to 101 this morning, she states that she is having urinary frequency denies any dysuria hematuria denies any vaginal bleeding or discharge she reports she did have an episode of diarrhea. Denies any history of kidney stone, denies any known recent sick contacts denies any chest pain, cough or shortness of breath. The history is provided by the patient. REVIEW OF SYSTEMS     Review of Systems   Constitutional:  Positive for fatigue and fever. HENT:  Negative for congestion and ear pain. Eyes:  Negative for pain. Respiratory:  Negative for shortness of breath. Cardiovascular:  Negative for chest pain, palpitations and leg swelling. Gastrointestinal:  Positive for abdominal pain and nausea. Genitourinary:  Positive for flank pain and frequency. Negative for dysuria. Musculoskeletal:  Negative for back pain. Skin:  Negative for color change. Neurological:  Negative for numbness and headaches. Psychiatric/Behavioral:  Negative for confusion. All other systems reviewed and are negative. PASTMEDICAL HISTORY     Past Medical History:   Diagnosis Date    Anxiety     Bipolar disorder ([facility]     disorder #2    Dental decay     Depression     History of heroin abuse ([facility]     previous IV heroin abuse; clean since 10/26/2012    Hyperlipidemia     Hypertension     Type II or unspecified type diabetes mellitus without mention of complication, not stated as uncontrolled      Past Problem List  Patient Active Problem List   Diagnosis Code    Type 2 diabetes mellitus with diabetic polyneuropathy, with long-term current use of insulin ([facility] E11.42, Z79.4    Hyperlipidemia E78.5    Diabetic polyneuropathy associated with type 2 diabetes mellitus ([facility] E11.42    Essential hypertension I10    Depression F32. A    Left ankle swelling M25.472    Class 1 obesity due to excess calories with serious comorbidity and body mass index (BMI) of 33.0 to 33.9 in adult E66.09, Z68.33    Onychomycosis B35.1    Leukocytosis D72.829    Vitamin D deficiency E55.9     SURGICAL HISTORY       Past Surgical History:   Procedure Laterality Date    OOPHORECTOMY Right     TONSILLECTOMY      TUBAL LIGATION       CURRENT MEDICATIONS       Discharge Medication List as of 12/4/2022  4:32 PM        CONTINUE these medications which have NOT CHANGED    Details   nitrofurantoin, macrocrystal-monohydrate, (MACROBID) 100 MG capsule Take 1 capsule by mouth 2 times daily for 7 days, Disp-14 capsule, R-0Normal      atorvastatin (LIPITOR) 40 MG tablet take 1 tablet by mouth once daily, Disp-90 tablet, R-3Normal      glyBURIDE (DIABETA) 5 MG tablet TAKE 1 TAB TWICE /DAY, Disp-180 tablet, R-1Normal      dapagliflozin (FARXIGA) 10 MG tablet Take 1 tablet by mouth every morning, Disp-30 tablet, R-5Normal      Insulin NPH Isophane & Regular (HUMULIN 70/30 KWIKPEN) (70-30) 100 UNIT per ML injection pen inject 75 units subcutaneously twice a day, Disp-30 mL, R-0Normal      aspirin EC 81 MG EC tablet Take 1 tablet by mouth daily, Disp-90 tablet, R-1Normal      gabapentin (NEURONTIN) 300 MG capsule Take 1 capsule by mouth 3 times daily for 30 days. , Disp-90 capsule, R-0Normal      fenofibrate (TRICOR) 145 MG tablet Take 1 tablet by mouth daily take 1 tablet by mouth once daily, Disp-90 tablet, R-2Normal      citalopram (CELEXA) 40 MG tablet take 1 tablet by mouth once daily, Disp-30 tablet, R-2Normal      !! Insulin Pen Needle 29G X 12.7MM MISC DAILY Starting 2022, Disp-120 each, R-1, NormalTo use with insulin      !! Insulin Pen Needle 31G X 8 MM MISC DAILY Starting Mon 10/25/2021, Disp-100 each, R-1, Normal      !! B-D UF III MINI PEN NEEDLES 31G X 5 MM MISC 2 TIMES DAILY Starting 10/13/2016, Until Discontinued, Disp-100 each, R-1, ANH, Normal      glucose blood VI test strips (ONE TOUCH ULTRA TEST) strip Disp-100 strip, R-2, NormalTEST twice a day      Lancets MISC Disp-100 each, R-2, NormalOne Touch. Patient is to test blood sugars twice a day. !! - Potential duplicate medications found. Please discuss with provider. ALLERGIES     is allergic to pcn [penicillins] and reglan [metoclopramide]. FAMILY HISTORY     She indicated that her mother is alive. She indicated that her father is alive. She indicated that her brother is alive. She indicated that her maternal grandmother is . She indicated that her maternal grandfather is . She indicated that her paternal grandmother is . She indicated that her paternal grandfather is . SOCIAL HISTORY       Social History     Tobacco Use    Smoking status: Every Day     Packs/day: 2.00     Years: 23.00     Pack years: 46.00     Types: Cigarettes    Smokeless tobacco: Never   Substance Use Topics    Alcohol use:  Yes     Alcohol/week: 0.0 standard drinks    Drug use: Not Currently     Comment: previous IV heroin abuse; clean since 10/26/2012     PHYSICAL EXAM     INITIAL VITALS: /72   Pulse (!) 107   Temp 99.6 °F (37.6 °C)   Resp 16   Ht 5' 3\" (1.6 m)   Wt 207 lb (93.9 kg)   SpO2 99%   BMI 36.67 kg/m²    Physical Exam  Vitals and nursing note reviewed. Constitutional:       General: She is not in acute distress. Appearance: Normal appearance. She is not toxic-appearing. HENT:      Head: Normocephalic and atraumatic. Nose: Nose normal.      Mouth/Throat:      Mouth: Mucous membranes are moist.      Pharynx: Oropharynx is clear. Eyes:      General: No visual field deficit. Extraocular Movements: Extraocular movements intact. Conjunctiva/sclera: Conjunctivae normal.      Pupils: Pupils are equal, round, and reactive to light. Cardiovascular:      Rate and Rhythm: Regular rhythm. Tachycardia present. Pulses: Normal pulses. Radial pulses are 2+ on the right side. Dorsalis pedis pulses are 2+ on the right side and 2+ on the left side. Heart sounds: Normal heart sounds. Pulmonary:      Effort: Pulmonary effort is normal.      Breath sounds: Normal breath sounds. Abdominal:      General: Bowel sounds are normal. There is no distension. Palpations: Abdomen is soft. Tenderness: There is generalized abdominal tenderness. There is right CVA tenderness and left CVA tenderness. Musculoskeletal:         General: Normal range of motion. Cervical back: Normal range of motion. No spinous process tenderness or muscular tenderness. Lumbar back: No tenderness or bony tenderness. Skin:     General: Skin is warm and dry. Capillary Refill: Capillary refill takes less than 2 seconds. Neurological:      General: No focal deficit present. Mental Status: She is alert and oriented to person, place, and time. Cranial Nerves: Cranial nerves 2-12 are intact. No cranial nerve deficit, dysarthria or facial asymmetry. Sensory: Sensation is intact. No sensory deficit. Motor: Motor function is intact.  No weakness. Coordination: Coordination is intact. Finger-Nose-Finger Test and Heel to [place] julia Test normal.      Gait: Gait is intact. Gait normal.   Psychiatric:         Mood and Affect: Mood normal.         Thought Content: Thought content does not include homicidal or suicidal ideation. MEDICAL DECISION MAKIN-year-old female presents for complaints of flank pain and lower abdominal pain. On initial exam patient in no acute distress she is noted have initial heart rate of 136 temp was 99.6, abdomen is soft she has generalized tenderness has tenderness in bilateral flanks as well, she was complaining of some back pain as well but no midline tenderness, no neurodeficits, we will check labs and imaging    UA was reviewed, she was noted to have positive nitrites more leuks magnesium 1.4 and was replaced CT was negative for acute process    Heart rate improved after fluids and meds    Patient was reevaluated she reports she is feeling better    Suspect symptoms are likely related to UTI, given that she is feeling better no other significant findings on CT, feel she is stable for discharge home at this time results were discussed with patient, discussed need for follow-up with her PCP and return precautions, patient voiced understanding comfortable plan of discharge    Patient/Guardian was informed of their diagnosis and told to follow up with PCP in 1-3 days. Patient demonstrates understanding and agreement with the plan. They were given the opportunity to ask questions and those questions were answered to the best of our ability with the available information. Patient/Guardian told to return to the ED for any new, worsening, changing or persistent symptoms. This dictation was prepared using [employer] voice recognition software.             CRITICAL CARE:       PROCEDURES:    Procedures    DIAGNOSTIC RESULTS   EKG:All EKG's are interpreted by the Emergency Department Physician who either signs or Co-signs this chart in the absence of a cardiologist.    Sinus tach rate of 123, normal axis, normal intervals, no ST segment elevation or depression, no significant T wave changes    RADIOLOGY:All plain film, CT, MRI, and formal ultrasound images (except ED bedside ultrasound) are read by the radiologist, see reports below, unless otherwisenoted in MDM or here. CT ABDOMEN PELVIS W IV CONTRAST Additional Contrast? None   Final Result   1. Diverticulosis. 2. Fatty infiltration of the liver. CT LUMBAR SPINE WO CONTRAST   Final Result   1. No acute abnormality. 2. Bertolotti syndrome. LABS: All lab results were reviewed by myself, and all abnormals are listed below.   Labs Reviewed   CBC WITH AUTO DIFFERENTIAL - Abnormal; Notable for the following components:       Result Value    Seg Neutrophils 80 (*)     Lymphocytes 3 (*)     Absolute Lymph # 0.28 (*)     All other components within normal limits   COMPREHENSIVE METABOLIC PANEL - Abnormal; Notable for the following components:    Glucose 288 (*)     Potassium 3.5 (*)     Alkaline Phosphatase 186 (*)     ALT 74 (*)     AST 59 (*)     All other components within normal limits   MAGNESIUM - Abnormal; Notable for the following components:    Magnesium 1.4 (*)     All other components within normal limits   URINALYSIS WITH REFLEX TO CULTURE - Abnormal; Notable for the following components:    Color, UA Orange (*)     Glucose, Ur LARGE (*)     Bilirubin Urine NEGATIVE  Verified by ictotest. (*)     Specific Gravity, UA 1.086 (*)     Urine Hgb SMALL (*)     Protein, UA 1+ (*)     Nitrite, Urine POSITIVE (*)     Leukocyte Esterase, Urine SMALL (*)     All other components within normal limits   LACTIC ACID - Abnormal; Notable for the following components:    Lactic Acid 2.7 (*)     All other components within normal limits   COVID-19 & INFLUENZA COMBO   CULTURE, URINE   LIPASE   PROTIME-INR   TROPONIN   TROPONIN   PREGNANCY, URINE   MICROSCOPIC URINALYSIS       EMERGENCY DEPARTMENTCOURSE:         Vitals:    Vitals:    12/04/22 1142 12/04/22 1519 12/04/22 1521 12/04/22 1627   BP: (!) 150/73  108/72    Pulse: (!) 136 (!) 121 (!) 117 (!) 107   Resp: 20  16    Temp: 99.6 °F (37.6 °C)      SpO2: 97% 96% 99% 99%   Weight: 207 lb (93.9 kg)      Height: 5' 3\" (1.6 m)          The patient was given the following medications while in the emergency department:  Orders Placed This Encounter   Medications    0.9 % sodium chloride bolus    ondansetron (ZOFRAN) injection 4 mg    morphine sulfate (PF) injection 4 mg    iopamidol (ISOVUE-370) 76 % injection 75 mL    DISCONTD: sodium chloride flush 0.9 % injection 10 mL    0.9 % sodium chloride bolus    ketorolac (TORADOL) injection 30 mg    0.9 % sodium chloride bolus    magnesium oxide (MAG-OX) tablet 400 mg    cephALEXin (KEFLEX) 500 MG capsule     Sig: Take 1 capsule by mouth 4 times daily for 10 days     Dispense:  40 capsule     Refill:  0    ondansetron (ZOFRAN-ODT) 4 MG disintegrating tablet     Sig: Take 1 tablet by mouth every 8 hours as needed for Nausea or Vomiting     Dispense:  20 tablet     Refill:  0    ibuprofen (ADVIL;MOTRIN) 600 MG tablet     Sig: Take 1 tablet by mouth every 6 hours as needed for Pain     Dispense:  20 tablet     Refill:  0     CONSULTS:  None    FINAL IMPRESSION      1.  Acute cystitis with hematuria          DISPOSITION/PLAN   DISPOSITION Decision To Discharge 12/04/2022 04:29:35 PM      PATIENT REFERRED TO:  Sophie Swanson MD  [address] [phone]    Schedule an appointment as soon as possible for a visit       [facility] ED  [address] [address] [phone]    As needed, If symptoms worsen  DISCHARGE MEDICATIONS:  Discharge Medication List as of 12/4/2022  4:32 PM        START taking these medications    Details   cephALEXin (KEFLEX) 500 MG capsule Take 1 capsule by mouth 4 times daily for 10 days, Disp-40 capsule,

## 2022-12-05 LAB
CULTURE: ABNORMAL
SPECIMEN DESCRIPTION: ABNORMAL

## 2022-12-06 LAB
EKG ATRIAL RATE: 123 BPM
EKG P AXIS: 59 DEGREES
EKG P-R INTERVAL: 144 MS
EKG Q-T INTERVAL: 280 MS
EKG QRS DURATION: 82 MS
EKG QTC CALCULATION (BAZETT): 400 MS
EKG R AXIS: -2 DEGREES
EKG T AXIS: 75 DEGREES
EKG VENTRICULAR RATE: 123 BPM

## 2022-12-06 PROCEDURE — 93010 ELECTROCARDIOGRAM REPORT: CPT | Performed by: INTERNAL MEDICINE

## 2022-12-28 DIAGNOSIS — E11.42 TYPE 2 DIABETES MELLITUS WITH DIABETIC POLYNEUROPATHY, WITH LONG-TERM CURRENT USE OF INSULIN (HCC): ICD-10-CM

## 2022-12-28 DIAGNOSIS — Z79.4 TYPE 2 DIABETES MELLITUS WITH DIABETIC POLYNEUROPATHY, WITH LONG-TERM CURRENT USE OF INSULIN (HCC): ICD-10-CM

## 2022-12-28 RX ORDER — GABAPENTIN 300 MG/1
CAPSULE ORAL
Qty: 90 CAPSULE | Refills: 0 | Status: SHIPPED | OUTPATIENT
Start: 2022-12-28 | End: 2022-12-29 | Stop reason: SDUPTHER

## 2022-12-28 NOTE — TELEPHONE ENCOUNTER
Please Approve or Refuse.   Send to Pharmacy per Pt's Request: [facility]      Next Visit Date:  2/21/2023   Last Visit Date: 11/22/2022    Hemoglobin A1C (%)   Date Value   11/22/2022 11.3   02/19/2022 11.1 (H)   06/19/2021 7.8 (H)             ( goal A1C is < 7)   BP Readings from Last 3 Encounters:   12/04/22 108/72   12/03/22 124/81   11/22/22 124/72          (goal 120/80)  BUN   Date Value Ref Range Status   12/04/2022 7 6 - 20 mg/dL Final     Creatinine   Date Value Ref Range Status   12/04/2022 0.54 0.50 - 0.90 mg/dL Final     Potassium   Date Value Ref Range Status   12/04/2022 3.5 (L) 3.7 - 5.3 mmol/L Final

## 2022-12-29 DIAGNOSIS — Z79.4 TYPE 2 DIABETES MELLITUS WITH DIABETIC POLYNEUROPATHY, WITH LONG-TERM CURRENT USE OF INSULIN (HCC): ICD-10-CM

## 2022-12-29 DIAGNOSIS — E11.42 TYPE 2 DIABETES MELLITUS WITH DIABETIC POLYNEUROPATHY, WITH LONG-TERM CURRENT USE OF INSULIN (HCC): ICD-10-CM

## 2022-12-30 DIAGNOSIS — E11.42 DIABETIC POLYNEUROPATHY ASSOCIATED WITH TYPE 2 DIABETES MELLITUS (HCC): ICD-10-CM

## 2022-12-30 DIAGNOSIS — Z79.4 TYPE 2 DIABETES MELLITUS WITH DIABETIC POLYNEUROPATHY, WITH LONG-TERM CURRENT USE OF INSULIN (HCC): ICD-10-CM

## 2022-12-30 DIAGNOSIS — E11.42 TYPE 2 DIABETES MELLITUS WITH DIABETIC POLYNEUROPATHY, WITH LONG-TERM CURRENT USE OF INSULIN (HCC): ICD-10-CM

## 2022-12-30 RX ORDER — GABAPENTIN 300 MG/1
300 CAPSULE ORAL 3 TIMES DAILY
Qty: 90 CAPSULE | Refills: 0 | Status: SHIPPED | OUTPATIENT
Start: 2022-12-30 | End: 2023-01-29

## 2022-12-30 RX ORDER — INSULIN HUMAN 100 [IU]/ML
INJECTION, SUSPENSION SUBCUTANEOUS
Qty: 30 ML | Refills: 0 | Status: SHIPPED | OUTPATIENT
Start: 2022-12-30

## 2022-12-30 RX ORDER — INSULIN HUMAN 100 [IU]/ML
INJECTION, SUSPENSION SUBCUTANEOUS
Qty: 30 ML | Refills: 0 | OUTPATIENT
Start: 2022-12-30

## 2022-12-30 RX ORDER — GLYBURIDE 5 MG/1
TABLET ORAL
Qty: 180 TABLET | Refills: 1 | Status: SHIPPED | OUTPATIENT
Start: 2022-12-30 | End: 2023-02-21 | Stop reason: ALTCHOICE

## 2023-01-14 DIAGNOSIS — Z79.4 TYPE 2 DIABETES MELLITUS WITH DIABETIC POLYNEUROPATHY, WITH LONG-TERM CURRENT USE OF INSULIN (HCC): ICD-10-CM

## 2023-01-14 DIAGNOSIS — E11.42 TYPE 2 DIABETES MELLITUS WITH DIABETIC POLYNEUROPATHY, WITH LONG-TERM CURRENT USE OF INSULIN (HCC): ICD-10-CM

## 2023-01-16 RX ORDER — INSULIN HUMAN 100 [IU]/ML
INJECTION, SUSPENSION SUBCUTANEOUS
Qty: 90 ML | Refills: 1 | Status: SHIPPED | OUTPATIENT
Start: 2023-01-16

## 2023-01-21 DIAGNOSIS — F41.9 ANXIETY: ICD-10-CM

## 2023-01-21 DIAGNOSIS — F33.41 RECURRENT MAJOR DEPRESSIVE DISORDER, IN PARTIAL REMISSION (HCC): ICD-10-CM

## 2023-01-23 RX ORDER — CITALOPRAM 40 MG/1
TABLET ORAL
Qty: 30 TABLET | Refills: 2 | Status: SHIPPED | OUTPATIENT
Start: 2023-01-23 | End: 2023-01-26 | Stop reason: SDUPTHER

## 2023-01-26 DIAGNOSIS — E11.42 TYPE 2 DIABETES MELLITUS WITH DIABETIC POLYNEUROPATHY, WITH LONG-TERM CURRENT USE OF INSULIN (HCC): ICD-10-CM

## 2023-01-26 DIAGNOSIS — Z79.4 TYPE 2 DIABETES MELLITUS WITH DIABETIC POLYNEUROPATHY, WITH LONG-TERM CURRENT USE OF INSULIN (HCC): ICD-10-CM

## 2023-01-26 DIAGNOSIS — F33.41 RECURRENT MAJOR DEPRESSIVE DISORDER, IN PARTIAL REMISSION (HCC): ICD-10-CM

## 2023-01-26 DIAGNOSIS — F41.9 ANXIETY: ICD-10-CM

## 2023-01-27 RX ORDER — GABAPENTIN 300 MG/1
300 CAPSULE ORAL 3 TIMES DAILY
Qty: 90 CAPSULE | Refills: 0 | Status: SHIPPED | OUTPATIENT
Start: 2023-01-27 | End: 2023-02-26

## 2023-01-27 RX ORDER — CITALOPRAM 40 MG/1
40 TABLET ORAL DAILY
Qty: 90 TABLET | Refills: 2 | Status: SHIPPED | OUTPATIENT
Start: 2023-01-27

## 2023-02-20 ENCOUNTER — HOSPITAL ENCOUNTER (OUTPATIENT)
Age: 38
Discharge: HOME | End: 2023-02-20
Payer: COMMERCIAL

## 2023-02-20 DIAGNOSIS — Z79.4 TYPE 2 DIABETES MELLITUS WITH DIABETIC POLYNEUROPATHY, WITH LONG-TERM CURRENT USE OF INSULIN (HCC): ICD-10-CM

## 2023-02-20 DIAGNOSIS — E55.9 VITAMIN D DEFICIENCY: ICD-10-CM

## 2023-02-20 DIAGNOSIS — E11.42 TYPE 2 DIABETES MELLITUS WITH DIABETIC POLYNEUROPATHY, WITH LONG-TERM CURRENT USE OF INSULIN (HCC): ICD-10-CM

## 2023-02-20 DIAGNOSIS — R81 GLUCOSURIA: ICD-10-CM

## 2023-02-20 DIAGNOSIS — E78.2 MIXED HYPERLIPIDEMIA: ICD-10-CM

## 2023-02-20 DIAGNOSIS — Z11.59 ENCOUNTER FOR SCREENING FOR OTHER VIRAL DISEASES: ICD-10-CM

## 2023-02-20 DIAGNOSIS — R30.0 DYSURIA: ICD-10-CM

## 2023-02-20 DIAGNOSIS — R10.9 FLANK PAIN: ICD-10-CM

## 2023-02-20 LAB
25(OH)D3 SERPL-MCNC: 6.4 NG/ML
ABSOLUTE EOS #: 0.1 K/UL (ref 0–0.4)
ABSOLUTE LYMPH #: 1.6 K/UL (ref 1–4.8)
ABSOLUTE MONO #: 0.5 K/UL (ref 0.1–1.3)
ALBUMIN SERPL-MCNC: 4 G/DL (ref 3.5–5.2)
ALP SERPL-CCNC: 150 U/L (ref 35–104)
ALT SERPL-CCNC: 35 U/L (ref 5–33)
ANION GAP SERPL CALCULATED.3IONS-SCNC: 9 MMOL/L (ref 9–17)
AST SERPL-CCNC: 25 U/L
BASOPHILS # BLD: 1 % (ref 0–2)
BASOPHILS ABSOLUTE: 0 K/UL (ref 0–0.2)
BILIRUB SERPL-MCNC: 0.2 MG/DL (ref 0.3–1.2)
BILIRUBIN URINE: NEGATIVE
BUN SERPL-MCNC: 6 MG/DL (ref 6–20)
CALCIUM SERPL-MCNC: 8.3 MG/DL (ref 8.6–10.4)
CHLORIDE SERPL-SCNC: 103 MMOL/L (ref 98–107)
CHOLEST SERPL-MCNC: 145 MG/DL
CHOLESTEROL/HDL RATIO: 5.6
CO2 SERPL-SCNC: 24 MMOL/L (ref 20–31)
COLOR: YELLOW
COMMENT UA: ABNORMAL
CREAT SERPL-MCNC: 0.54 MG/DL (ref 0.5–0.9)
CREATININE URINE: 82.1 MG/DL (ref 28–217)
EOSINOPHILS RELATIVE PERCENT: 2 % (ref 0–4)
GFR SERPL CREATININE-BSD FRML MDRD: >60 ML/MIN/1.73M2
GLUCOSE SERPL-MCNC: 333 MG/DL (ref 70–99)
GLUCOSE UR STRIP.AUTO-MCNC: ABNORMAL MG/DL
HCT VFR BLD AUTO: 41.9 % (ref 36–46)
HCV AB SER QL: NONREACTIVE
HDLC SERPL-MCNC: 26 MG/DL
HGB BLD-MCNC: 13.8 G/DL (ref 12–16)
KETONES UR STRIP.AUTO-MCNC: NEGATIVE MG/DL
LDLC SERPL CALC-MCNC: 75 MG/DL (ref 0–130)
LEUKOCYTE ESTERASE UR QL STRIP.AUTO: NEGATIVE
LYMPHOCYTES # BLD: 22 % (ref 24–44)
MCH RBC QN AUTO: 31.9 PG (ref 26–34)
MCHC RBC AUTO-ENTMCNC: 33 G/DL (ref 31–37)
MCV RBC AUTO: 96.7 FL (ref 80–100)
MICROALBUMIN/CREAT 24H UR: <12 MG/L
MICROALBUMIN/CREAT UR-RTO: NORMAL MCG/MG CREAT
MONOCYTES # BLD: 7 % (ref 1–7)
NITRITE UR QL STRIP.AUTO: NEGATIVE
PDW BLD-RTO: 13.9 % (ref 11.5–14.9)
PLATELET # BLD AUTO: 191 K/UL (ref 150–450)
PMV BLD AUTO: 10.4 FL (ref 6–12)
POTASSIUM SERPL-SCNC: 3.6 MMOL/L (ref 3.7–5.3)
PROT SERPL-MCNC: 7.4 G/DL (ref 6.4–8.3)
PROT UR STRIP.AUTO-MCNC: 5.5 MG/DL (ref 5–8)
PROT UR STRIP.AUTO-MCNC: NEGATIVE MG/DL
RBC # BLD: 4.33 M/UL (ref 4–5.2)
SEG NEUTROPHILS: 68 % (ref 36–66)
SEGMENTED NEUTROPHILS ABSOLUTE COUNT: 5.2 K/UL (ref 1.3–9.1)
SODIUM SERPL-SCNC: 136 MMOL/L (ref 135–144)
SPECIFIC GRAVITY UA: 1.03 (ref 1–1.03)
TRIGL SERPL-MCNC: 221 MG/DL
TSH SERPL-ACNC: 0.41 UIU/ML (ref 0.3–5)
TURBIDITY: CLEAR
URINE HGB: NEGATIVE
UROBILINOGEN, URINE: NORMAL
WBC # BLD AUTO: 7.5 K/UL (ref 3.5–11)

## 2023-02-20 PROCEDURE — 36415 COLL VENOUS BLD VENIPUNCTURE: CPT

## 2023-02-20 PROCEDURE — 84443 ASSAY THYROID STIM HORMONE: CPT

## 2023-02-20 PROCEDURE — 80061 LIPID PANEL: CPT

## 2023-02-20 PROCEDURE — 82570 ASSAY OF URINE CREATININE: CPT

## 2023-02-20 PROCEDURE — 82043 UR ALBUMIN QUANTITATIVE: CPT

## 2023-02-20 PROCEDURE — 81003 URINALYSIS AUTO W/O SCOPE: CPT

## 2023-02-20 PROCEDURE — 86803 HEPATITIS C AB TEST: CPT

## 2023-02-20 PROCEDURE — 82306 VITAMIN D 25 HYDROXY: CPT

## 2023-02-20 PROCEDURE — 80053 COMPREHEN METABOLIC PANEL: CPT

## 2023-02-20 PROCEDURE — 85025 COMPLETE CBC W/AUTO DIFF WBC: CPT

## 2023-02-21 ENCOUNTER — OFFICE VISIT (OUTPATIENT)
Dept: FAMILY MEDICINE CLINIC | Age: 38
End: 2023-02-21

## 2023-02-21 VITALS
TEMPERATURE: 98 F | SYSTOLIC BLOOD PRESSURE: 110 MMHG | DIASTOLIC BLOOD PRESSURE: 80 MMHG | OXYGEN SATURATION: 98 % | BODY MASS INDEX: 37.74 KG/M2 | HEIGHT: 63 IN | WEIGHT: 213 LBS | HEART RATE: 76 BPM

## 2023-02-21 DIAGNOSIS — E55.9 VITAMIN D DEFICIENCY: ICD-10-CM

## 2023-02-21 DIAGNOSIS — E78.2 MIXED HYPERLIPIDEMIA: ICD-10-CM

## 2023-02-21 DIAGNOSIS — E11.42 TYPE 2 DIABETES MELLITUS WITH DIABETIC POLYNEUROPATHY, WITH LONG-TERM CURRENT USE OF INSULIN (HCC): Primary | ICD-10-CM

## 2023-02-21 DIAGNOSIS — Z12.31 SCREENING MAMMOGRAM FOR HIGH-RISK PATIENT: ICD-10-CM

## 2023-02-21 DIAGNOSIS — E11.42 DIABETIC POLYNEUROPATHY ASSOCIATED WITH TYPE 2 DIABETES MELLITUS (HCC): ICD-10-CM

## 2023-02-21 DIAGNOSIS — F43.23 ACUTE ADJUSTMENT DISORDER WITH MIXED ANXIETY AND DEPRESSED MOOD: ICD-10-CM

## 2023-02-21 DIAGNOSIS — E87.6 HYPOKALEMIA: ICD-10-CM

## 2023-02-21 DIAGNOSIS — I10 ESSENTIAL HYPERTENSION: ICD-10-CM

## 2023-02-21 DIAGNOSIS — Z79.4 TYPE 2 DIABETES MELLITUS WITH DIABETIC POLYNEUROPATHY, WITH LONG-TERM CURRENT USE OF INSULIN (HCC): Primary | ICD-10-CM

## 2023-02-21 DIAGNOSIS — Z87.891 PERSONAL HISTORY OF TOBACCO USE: ICD-10-CM

## 2023-02-21 PROBLEM — D72.829 LEUKOCYTOSIS: Status: RESOLVED | Noted: 2021-09-22 | Resolved: 2023-02-21

## 2023-02-21 LAB — HBA1C MFR BLD: 9.6 %

## 2023-02-21 RX ORDER — BUPROPION HYDROCHLORIDE 150 MG/1
150 TABLET ORAL EVERY MORNING
Qty: 90 TABLET | Refills: 3 | Status: SHIPPED | OUTPATIENT
Start: 2023-02-21

## 2023-02-21 RX ORDER — ERGOCALCIFEROL 1.25 MG/1
50000 CAPSULE ORAL WEEKLY
Qty: 12 CAPSULE | Refills: 1 | Status: SHIPPED | OUTPATIENT
Start: 2023-02-21

## 2023-02-21 RX ORDER — GLIMEPIRIDE 4 MG/1
4 TABLET ORAL 2 TIMES DAILY
Qty: 180 TABLET | Refills: 1 | Status: SHIPPED | OUTPATIENT
Start: 2023-02-21

## 2023-02-21 RX ORDER — GLUCOSAMINE HCL/CHONDROITIN SU 500-400 MG
CAPSULE ORAL
Qty: 100 STRIP | Refills: 3 | Status: SHIPPED | OUTPATIENT
Start: 2023-02-21

## 2023-02-21 RX ORDER — POTASSIUM CHLORIDE 750 MG/1
10 TABLET, EXTENDED RELEASE ORAL DAILY
Qty: 180 TABLET | Refills: 1 | Status: SHIPPED | OUTPATIENT
Start: 2023-02-21

## 2023-02-21 RX ORDER — LANCETS 30 GAUGE
EACH MISCELLANEOUS
Qty: 100 EACH | Refills: 3 | Status: SHIPPED | OUTPATIENT
Start: 2023-02-21

## 2023-02-21 SDOH — ECONOMIC STABILITY: FOOD INSECURITY: WITHIN THE PAST 12 MONTHS, THE FOOD YOU BOUGHT JUST DIDN'T LAST AND YOU DIDN'T HAVE MONEY TO GET MORE.: NEVER TRUE

## 2023-02-21 SDOH — ECONOMIC STABILITY: FOOD INSECURITY: WITHIN THE PAST 12 MONTHS, YOU WORRIED THAT YOUR FOOD WOULD RUN OUT BEFORE YOU GOT MONEY TO BUY MORE.: NEVER TRUE

## 2023-02-21 SDOH — ECONOMIC STABILITY: INCOME INSECURITY: HOW HARD IS IT FOR YOU TO PAY FOR THE VERY BASICS LIKE FOOD, HOUSING, MEDICAL CARE, AND HEATING?: NOT HARD AT ALL

## 2023-02-21 ASSESSMENT — ENCOUNTER SYMPTOMS
WHEEZING: 0
SINUS PRESSURE: 0
BACK PAIN: 1
STRIDOR: 0
COUGH: 0
ABDOMINAL DISTENTION: 0
RECTAL PAIN: 0
CONSTIPATION: 0
NAUSEA: 0
EYE REDNESS: 0
RHINORRHEA: 0
TROUBLE SWALLOWING: 0
DIARRHEA: 0
SHORTNESS OF BREATH: 0
CHEST TIGHTNESS: 0
SORE THROAT: 0
BLOOD IN STOOL: 0
VOMITING: 0
ABDOMINAL PAIN: 0
COLOR CHANGE: 0

## 2023-02-21 ASSESSMENT — PATIENT HEALTH QUESTIONNAIRE - PHQ9
7. TROUBLE CONCENTRATING ON THINGS, SUCH AS READING THE NEWSPAPER OR WATCHING TELEVISION: 0
10. IF YOU CHECKED OFF ANY PROBLEMS, HOW DIFFICULT HAVE THESE PROBLEMS MADE IT FOR YOU TO DO YOUR WORK, TAKE CARE OF THINGS AT HOME, OR GET ALONG WITH OTHER PEOPLE: 0
6. FEELING BAD ABOUT YOURSELF - OR THAT YOU ARE A FAILURE OR HAVE LET YOURSELF OR YOUR FAMILY DOWN: 0
2. FEELING DOWN, DEPRESSED OR HOPELESS: 0
1. LITTLE INTEREST OR PLEASURE IN DOING THINGS: 0
4. FEELING TIRED OR HAVING LITTLE ENERGY: 0
SUM OF ALL RESPONSES TO PHQ9 QUESTIONS 1 & 2: 0
SUM OF ALL RESPONSES TO PHQ QUESTIONS 1-9: 0
5. POOR APPETITE OR OVEREATING: 0
9. THOUGHTS THAT YOU WOULD BE BETTER OFF DEAD, OR OF HURTING YOURSELF: 0
SUM OF ALL RESPONSES TO PHQ QUESTIONS 1-9: 0
SUM OF ALL RESPONSES TO PHQ QUESTIONS 1-9: 0
3. TROUBLE FALLING OR STAYING ASLEEP: 0
SUM OF ALL RESPONSES TO PHQ QUESTIONS 1-9: 0
8. MOVING OR SPEAKING SO SLOWLY THAT OTHER PEOPLE COULD HAVE NOTICED. OR THE OPPOSITE, BEING SO FIGETY OR RESTLESS THAT YOU HAVE BEEN MOVING AROUND A LOT MORE THAN USUAL: 0

## 2023-02-21 NOTE — PROGRESS NOTES
Visit Information    Have you changed or started any medications since your last visit including any over-the-counter medicines, vitamins, or herbal medicines? no   Are you having any side effects from any of your medications? -  no  Have you stopped taking any of your medications? Is so, why? -  no    Have you seen any other physician or provider since your last visit? No  Have you had any other diagnostic tests since your last visit? Yes - Records Obtained  Have you been seen in the emergency room and/or had an admission to a hospital since we last saw you? No  Have you had your routine dental cleaning in the past 6 months? yes     Have you activated your [employer] account? If not, what are your barriers?  Yes     Patient Care Team:  Amena Ochoa MD as PCP - General (Family Medicine)  Amena Ochoa MD as PCP - Empaneled Provider    Medical History Review  Past Medical, Family, and Social History reviewed and does contribute to the patient presenting condition    Health Maintenance   Topic Date Due    Pneumococcal 0-64 years Vaccine (1 - PCV) Never done    Hepatitis B vaccine (1 of 3 - Risk 3-dose series) Never done    DTaP/Tdap/Td vaccine (1 - Tdap) Never done    Diabetic retinal exam  06/10/2016    Cervical cancer screen  06/16/2018    COVID-19 Vaccine (3 - Booster for Pfizer series) 10/27/2021    Flu vaccine (1) 08/01/2022    Depression Monitoring  02/21/2023    A1C test (Diabetic or Prediabetic)  02/22/2023    Diabetic foot exam  11/22/2023    Diabetic Alb to Cr ratio (uACR) test  02/20/2024    Lipids  02/20/2024    GFR test (Diabetes, CKD 3-4, OR last GFR 15-59)  02/20/2024    Hepatitis C screen  Completed    HIV screen  Completed    Hepatitis A vaccine  Aged Out    Hib vaccine  Aged Out    Meningococcal (ACWY) vaccine  Aged Out    Varicella vaccine  Discontinued

## 2023-02-21 NOTE — PROGRESS NOTES
Chief Complaint   Patient presents with    Diabetes     Refused vaccines          Moriah Araya  here today for follow up on chronic medical problems, go over labs and/or diagnostic studies, and medication refills. Diabetes (Refused vaccines )      HPI: Patient is scheduled for follow-up on chronic medical conditions. Diabetes uncontrolled, A1c has mildly improved to 9.6 from 11.3. Patient is currently on Januvia, insulin and also glipizide. Patient reports she was taking glimepiride in the past and that was working better for her. Patient reports blood sugars have mildly improved but still high. She cannot afford to see ophthalmologist reports she is busy and has anxiety which is not under control. Patient refuses all vaccinations. Hypertension controlled currently not on any medications. She was started in the past..  Blood pressures running normal.    Hyperlipidemia with high triglycerides, improved from previous. She is on statins as well as fenofibrate's. Patient reports she is working on diet as well. The 10-year CVD risk score (WENCESLAO'Jeremiino, et al., 2008) is: 6.7%    Values used to calculate the score:      Age: 34 years      Sex: Female      Diabetic: Yes      Tobacco smoker: Yes      Systolic Blood Pressure: 142 mmHg      Is BP treated: No      HDL Cholesterol: 26 mg/dL      Total Cholesterol: 145 mg/dL      Patient had blood work done that showed severe vitamin D deficiency, not on any supplements. Discussed with patient. Hypokalemia on blood work and also hypercalcemia, started on supplements patient is complaining of fatigue and tiredness. Diabetic polyneuropathy, is on Neurontin denies any illicit drug use. Patient reports pain is fairly controlled. Patient has history of anxiety and depression, not under good control. Patient reports she has severe anxiety while going out. Currently she is on Celexa 40 mg, also tried BuSpar in the past that did not work.   Patient would like to add some other medication. She denies any suicidal thoughts or attempts. SOL 7 SCORE 9/16/2020   SOL-7 Total Score 19     Interpretation of SOL-7 score: 5-9 = mild anxiety, 10-14 = moderate anxiety, 15+ = severe anxiety. Recommend referral to behavioral health for scores 10 or greater. Patient still smokes, not ready to quit. Patient reports when she is ready she will quit at that time. /80   Pulse 76   Temp 98 °F (36.7 °C)   Ht 5' 3\" (1.6 m)   Wt 213 lb (96.6 kg)   LMP 02/12/2023 (Approximate)   SpO2 98%   BMI 37.73 kg/m²    Body mass index is 37.73 kg/m². Wt Readings from Last 3 Encounters:   02/21/23 213 lb (96.6 kg)   12/04/22 207 lb (93.9 kg)   12/03/22 207 lb (93.9 kg)        [x]Negative depression screening. PHQ Scores 2/21/2023 2/21/2022 12/28/2021 9/21/2021 6/21/2021 9/16/2020 2/3/2017   PHQ2 Score 0 3 0 0 2 0 0   PHQ9 Score 0 9 0 0 2 0 0      []1-4 = Minimal depression   []5-9 = Milddepression   []10-14 = Moderate depression   []15-19 = Moderately severe depression   []20-27 = Severe depression    Discussed testing with the patient and all questions fully answered. Hospital Outpatient Visit on 02/20/2023   Component Date Value Ref Range Status    Glucose 02/20/2023 333 (A)  70 - 99 mg/dL Final    BUN 02/20/2023 6  6 - 20 mg/dL Final    Creatinine 02/20/2023 0.54  0.50 - 0.90 mg/dL Final    Est, Glom Filt Rate 02/20/2023 >60  >60 mL/min/1.73m2 Final    Comment:       These results are not intended for use in patients <25years of age. eGFR results are calculated without a race factor using the 2021 CKD-EPI equation. Careful clinical correlation is recommended, particularly when comparing to results   calculated using previous equations. The CKD-EPI equation is less accurate in patients with extremes of muscle mass, extra-renal   metabolism of creatine, excessive creatine ingestion, or following therapy that affects   renal tubular secretion.       Calcium 02/20/2023 8.3 (A)  8.6 - 10.4 mg/dL Final    Sodium 02/20/2023 136  135 - 144 mmol/L Final    Potassium 02/20/2023 3.6 (A)  3.7 - 5.3 mmol/L Final    Chloride 02/20/2023 103  98 - 107 mmol/L Final    CO2 02/20/2023 24  20 - 31 mmol/L Final    Anion Gap 02/20/2023 9  9 - 17 mmol/L Final    Alkaline Phosphatase 02/20/2023 150 (A)  35 - 104 U/L Final    ALT 02/20/2023 35 (A)  5 - 33 U/L Final    AST 02/20/2023 25  <32 U/L Final    Total Bilirubin 02/20/2023 0.2 (A)  0.3 - 1.2 mg/dL Final    Total Protein 02/20/2023 7.4  6.4 - 8.3 g/dL Final    Albumin 02/20/2023 4.0  3.5 - 5.2 g/dL Final    WBC 02/20/2023 7.5  3.5 - 11.0 k/uL Final    RBC 02/20/2023 4.33  4.0 - 5.2 m/uL Final    Hemoglobin 02/20/2023 13.8  12.0 - 16.0 g/dL Final    Hematocrit 02/20/2023 41.9  36 - 46 % Final    MCV 02/20/2023 96.7  80 - 100 fL Final    MCH 02/20/2023 31.9  26 - 34 pg Final    MCHC 02/20/2023 33.0  31 - 37 g/dL Final    RDW 02/20/2023 13.9  11.5 - 14.9 % Final    Platelets 94/29/0224 191  150 - 450 k/uL Final    MPV 02/20/2023 10.4  6.0 - 12.0 fL Final    Seg Neutrophils 02/20/2023 68 (A)  36 - 66 % Final    Lymphocytes 02/20/2023 22 (A)  24 - 44 % Final    Monocytes 02/20/2023 7  1 - 7 % Final    Eosinophils % 02/20/2023 2  0 - 4 % Final    Basophils 02/20/2023 1  0 - 2 % Final    Segs Absolute 02/20/2023 5.20  1.3 - 9.1 k/uL Final    Absolute Lymph # 02/20/2023 1.60  1.0 - 4.8 k/uL Final    Absolute Mono # 02/20/2023 0.50  0.1 - 1.3 k/uL Final    Absolute Eos # 02/20/2023 0.10  0.0 - 0.4 k/uL Final    Basophils Absolute 02/20/2023 0.00  0.0 - 0.2 k/uL Final    Cholesterol 02/20/2023 145  <200 mg/dL Final    Comment:    Cholesterol Guidelines:      <200  Desirable   200-240  Borderline      >240  Undesirable         HDL 02/20/2023 26 (A)  >40 mg/dL Final    Comment:    HDL Guidelines:    <40     Undesirable   40-59    Borderline    >59     Desirable         LDL Cholesterol 02/20/2023 75  0 - 130 mg/dL Final    Comment:    LDL Guidelines:     <100    Desirable   100-129   Near to/above Desirable   130-159   Borderline      >159   Undesirable     Direct (measured) LDL and calculated LDL are not interchangeable tests. Chol/HDL Ratio 02/20/2023 5.6 (A)  <5 Final            Triglycerides 02/20/2023 221 (A)  <150 mg/dL Final    Comment:    Triglyceride Guidelines:     <150   Desirable   150-199  Borderline   200-499  High     >499   Very high   Based on AHA Guidelines for fasting triglyceride, October 2012. Vit D, 25-Hydroxy 02/20/2023 6.4 (A)  >29.9 ng/mL Final    Comment:    Reference Range:  Vitamin D status         Range   Deficiency              <20 ng/mL   Mild Deficiency       20-30 ng/mL   Sufficiency           ng/mL   Toxicity               >100 ng/mL      TSH 02/20/2023 0.41  0.30 - 5.00 uIU/mL Final    Hepatitis C Ab 02/20/2023 NONREACTIVE  NONREACTIVE Final    Comment:       The hepatitis C procedure used in our laboratory is a Chemiluminescent test specific for   three recombinant HCV antigens. A negative anti-HCV result indicates that the antibodies to   hepatitis C virus are not present at this time. Individuals with reactive anti-HCV should be considered infected and infectious until proven   otherwise. Confirmation of all equivocal or reactive results is recommended by ordering   HCV RNA by PCR.       Color, UA 02/20/2023 Yellow  Yellow Final    Turbidity UA 02/20/2023 Clear  Clear Final    Glucose, Ur 02/20/2023 MOD (A)  NEGATIVE Final    Bilirubin Urine 02/20/2023 NEGATIVE  NEGATIVE Final    Ketones, Urine 02/20/2023 NEGATIVE  NEGATIVE Final    Specific Gravity, UA 02/20/2023 1.032 (A)  1.000 - 1.030 Final    Urine Hgb 02/20/2023 NEGATIVE  NEGATIVE Final    pH, UA 02/20/2023 5.5  5.0 - 8.0 Final    Protein, UA 02/20/2023 NEGATIVE  NEGATIVE Final    Urobilinogen, Urine 02/20/2023 Normal  Normal Final    Nitrite, Urine 02/20/2023 NEGATIVE  NEGATIVE Final    Leukocyte Esterase, Urine 02/20/2023 NEGATIVE NEGATIVE Final    Urinalysis Comments 02/20/2023 Microscopic exam not performed based on chemical results unless requested in original order. Final    Microalb, Ur 02/20/2023 <12  <21 mg/L Final    Creatinine, Ur 02/20/2023 82.1  28.0 - 217.0 mg/dL Final    Microalb/Crt.  Ratio 02/20/2023 Can not be calculated  <25 mcg/mg creat Final         Most recent labs reviewed:     Lab Results   Component Value Date    WBC 7.5 02/20/2023    HGB 13.8 02/20/2023    HCT 41.9 02/20/2023    MCV 96.7 02/20/2023     02/20/2023       @BRIEFLAB(NA,K,CL,CO2,BUN,CREATININE,GLUCOSE,CALCIUM)@     Lab Results   Component Value Date    ALT 35 (H) 02/20/2023    AST 25 02/20/2023    ALKPHOS 150 (H) 02/20/2023    BILITOT 0.2 (L) 02/20/2023       Lab Results   Component Value Date    TSH 0.41 02/20/2023       Lab Results   Component Value Date    CHOL 145 02/20/2023    CHOL 167 01/18/2021    CHOL 150 04/05/2016     Lab Results   Component Value Date    TRIG 221 (H) 02/20/2023    TRIG 454 (H) 01/18/2021    TRIG 228 04/05/2016     Lab Results   Component Value Date    HDL 26 (L) 02/20/2023    HDL 25 (L) 01/18/2021    HDL 36 (L) 03/31/2020     Lab Results   Component Value Date    LDLCALC 78 04/05/2016    LDLCALC 150 06/04/2015    LDLCHOLESTEROL 75 02/20/2023    LDLCHOLESTEROL      01/18/2021    LDLCHOLESTEROL 171 (H) 03/31/2020     Lab Results   Component Value Date    VLDL NOT REPORTED 01/18/2021    VLDL NOT REPORTED (H) 03/31/2020    VLDL 46 04/05/2016     Lab Results   Component Value Date    CHOLHDLRATIO 5.6 (H) 02/20/2023    CHOLHDLRATIO 6.7 (H) 01/18/2021    CHOLHDLRATIO 6.7 (H) 03/31/2020       Lab Results   Component Value Date    LABA1C 9.6 02/21/2023       No results found for: [id number]    No results found for: FOLATE    No results found for: IRON, TIBC, FERRITIN    Lab Results   Component Value Date    VITD25 6.4 (L) 02/20/2023             Current Outpatient Medications   Medication Sig Dispense Refill    vitamin D (ERGOCALCIFEROL) 1.25 MG (63774 UT) CAPS capsule Take 1 capsule by mouth once a week 12 capsule 1    potassium chloride (KLOR-CON M) 10 MEQ extended release tablet Take 1 tablet by mouth daily 180 tablet 1    glimepiride (AMARYL) 4 MG tablet Take 1 tablet by mouth 2 times daily 180 tablet 1    blood glucose monitor strips Testing once a day. Please dispense according to patients insurance. 100 strip 3    Lancets MISC Testing once a day. Please dispense according to patients insurance. 100 each 3    buPROPion (WELLBUTRIN XL) 150 MG extended release tablet Take 1 tablet by mouth every morning 90 tablet 3    gabapentin (NEURONTIN) 300 MG capsule Take 1 capsule by mouth 3 times daily for 30 days.  take 1 capsule by mouth three times a day 90 capsule 0    citalopram (CELEXA) 40 MG tablet Take 1 tablet by mouth daily take 1 tablet by mouth once daily 90 tablet 2    Insulin NPH Isophane & Regular (HUMULIN 70/30 KWIKPEN) (70-30) 100 UNIT per ML injection pen inject 75 units subcutaneously twice a day 90 mL 1    SITagliptin (JANUVIA) 100 MG tablet Take 1 tablet by mouth daily 90 tablet 1    Insulin Pen Needle 29G X 12.7MM MISC 1 each by Does not apply route daily To use with insulin 120 each 1    Insulin NPH Isophane & Regular (HUMULIN 70/30 KWIKPEN) (70-30) 100 UNIT per ML injection pen inject 75 units subcutaneously twice a day 30 mL 0    ibuprofen (ADVIL;MOTRIN) 600 MG tablet Take 1 tablet by mouth every 6 hours as needed for Pain 20 tablet 0    atorvastatin (LIPITOR) 40 MG tablet take 1 tablet by mouth once daily 90 tablet 3    fenofibrate (TRICOR) 145 MG tablet Take 1 tablet by mouth daily take 1 tablet by mouth once daily 90 tablet 2    Insulin Pen Needle 31G X 8 MM MISC 1 each by Does not apply route daily 100 each 1    B-D UF III MINI PEN NEEDLES 31G X 5 MM MISC Inject 1 each into the skin 2 times daily 100 each 1    glucose blood VI test strips (ONE TOUCH ULTRA TEST) strip TEST twice a day 100 strip 2    Lancets MISC One Touch. Patient is to test blood sugars twice a day. 100 each 2    aspirin EC 81 MG EC tablet Take 1 tablet by mouth daily (Patient not taking: No sig reported) 90 tablet 1     No current facility-administered medications for this visit. Social History     Socioeconomic History    Marital status:      Spouse name: Not on file    Number of children: Not on file    Years of education: Not on file    Highest education level: Not on file   Occupational History    Not on file   Tobacco Use    Smoking status: Every Day     Packs/day: 2.00     Years: 23.00     Pack years: 46.00     Types: Cigarettes    Smokeless tobacco: Never   Substance and Sexual Activity    Alcohol use: Yes     Alcohol/week: 0.0 standard drinks    Drug use: Not Currently     Comment: previous IV heroin abuse; clean since 10/26/2012    Sexual activity: Not on file   Other Topics Concern    Not on file   Social History Narrative    Not on file     Social Determinants of Health     Financial Resource Strain: Low Risk     Difficulty of Paying Living Expenses: Not hard at all   Food Insecurity: No Food Insecurity    Worried About [address] [employer] in the Last Year: Never true    [address] [employer] in the Last Year: Never true   Transportation Needs: Unknown    Lack of Transportation (Medical): Not on file    Lack of Transportation (Non-Medical):  No   Physical Activity: Not on file   Stress: Not on file   Social Connections: Not on file   Intimate Partner Violence: Not on file   Housing Stability: Unknown    Unable to Pay for Housing in the Last Year: Not on file    Number of Places Lived in the Last Year: Not on file    Unstable Housing in the Last Year: No     Ready to quit: Not Answered  Counseling given: Not Answered        Family History   Problem Relation Age of Onset    Other Mother         non alcoholic cirrhorsis liver-stage 4,fatty liver    Diabetes Mother     High Blood Pressure Mother     High Cholesterol Mother Depression Father     Anxiety Disorder Father     High Blood Pressure Father     High Cholesterol Father     Diabetes Father     Diabetes Paternal Grandmother              -rest of complaints with corresponding details per ROS    The patient's past medical, surgical, social, and family history as well as her current medications and allergies were reviewed as documented intoday's encounter. Review of Systems   Constitutional:  Positive for activity change. Negative for appetite change, fatigue, fever and unexpected weight change. HENT:  Negative for congestion, ear pain, postnasal drip, rhinorrhea, sinus pressure, sore throat and trouble swallowing. Eyes:  Negative for redness and visual disturbance. Respiratory:  Negative for cough, chest tightness, shortness of breath, wheezing and stridor. Cardiovascular:  Negative for chest pain, palpitations and leg swelling. Gastrointestinal:  Negative for abdominal distention, abdominal pain, blood in stool, constipation, diarrhea, nausea, rectal pain and vomiting. Endocrine: Negative for polydipsia, polyphagia and polyuria. Genitourinary:  Negative for difficulty urinating, flank pain, frequency, urgency and vaginal pain. Musculoskeletal:  Positive for arthralgias and back pain. Negative for gait problem, joint swelling, myalgias and neck pain. Skin:  Negative for color change, rash and wound. Allergic/Immunologic: Negative for food allergies and immunocompromised state. Neurological:  Positive for weakness and numbness. Negative for dizziness, speech difficulty, light-headedness and headaches. Psychiatric/Behavioral:  Positive for decreased concentration and dysphoric mood. Negative for agitation, behavioral problems, hallucinations, sleep disturbance and suicidal ideas. The patient is nervous/anxious. Physical Exam  Vitals and nursing note reviewed. Constitutional:       General: She is not in acute distress.      Appearance: Normal appearance. She is well-developed. She is obese. She is not diaphoretic. HENT:      Head: Normocephalic and atraumatic. Nose: Nose normal.   Eyes:      General:         Right eye: No discharge. Left eye: No discharge. Extraocular Movements: Extraocular movements intact. Conjunctiva/sclera: Conjunctivae normal.      Pupils: Pupils are equal, round, and reactive to light. Neck:      Thyroid: No thyromegaly. Cardiovascular:      Rate and Rhythm: Normal rate and regular rhythm. Heart sounds: Normal heart sounds. No murmur heard. Pulmonary:      Effort: Pulmonary effort is normal. No respiratory distress. Breath sounds: Normal breath sounds. No wheezing or rhonchi. Abdominal:      General: Bowel sounds are normal. There is no distension. Palpations: Abdomen is soft. There is no mass. Tenderness: There is no abdominal tenderness. There is no right CVA tenderness, left CVA tenderness, guarding or rebound. Musculoskeletal:         General: No tenderness. Cervical back: Normal range of motion and neck supple. No rigidity or spasms. Thoracic back: No spasms. Normal range of motion. Lumbar back: Spasms present. No deformity or tenderness. Decreased range of motion. Right lower leg: No edema. Left lower leg: No edema. Lymphadenopathy:      Cervical: No cervical adenopathy. Skin:     Coloration: Skin is not jaundiced or pale. Findings: No bruising, erythema or rash. Neurological:      General: No focal deficit present. Mental Status: She is alert and oriented to person, place, and time. Cranial Nerves: No cranial nerve deficit. Sensory: No sensory deficit. Motor: No weakness or tremor. Coordination: Coordination normal.      Gait: Gait and tandem walk normal.      Deep Tendon Reflexes: Reflexes are normal and symmetric.    Psychiatric:         Attention and Perception: Attention and perception normal. She is attentive. Mood and Affect: Mood is anxious. Mood is not depressed. Affect is not tearful. Speech: She is communicative. Speech is not rapid and pressured, delayed or slurred. Behavior: Behavior normal. Behavior is not agitated or slowed. Thought Content: Thought content normal.         Judgment: Judgment normal.           ASSESSMENT AND PLAN      1. Type 2 diabetes mellitus with diabetic polyneuropathy, with long-term current use of insulin (HCC)  Uncontrolled but improving, discontinue glyburide, start on glimepiride as that has worked good for patient in the past, start milligrams twice a day continue Januvia insulin. Continue to monitor blood sugars.  - POCT glycosylated hemoglobin (Hb A1C)  - glimepiride (AMARYL) 4 MG tablet; Take 1 tablet by mouth 2 times daily  Dispense: 180 tablet; Refill: 1  - blood glucose monitor strips; Testing once a day. Please dispense according to patients insurance. Dispense: 100 strip; Refill: 3  - Lancets MISC; Testing once a day. Please dispense according to patients insurance. Dispense: 100 each; Refill: 3    2. Essential hypertension  Controlled off of medications. Continue to monitor  Discussed and advised  Low-salt diet  Home monitoring of blood pressure    3. Mixed hyperlipidemia  Improving still high triglycerides continue fenofibrate statins  Discussed and advised  Low-carb low-fat diet    4. Vitamin D deficiency  Severely low start on vitamin D therapy higher dose for 1 year. Discussed to take diet rich in vitamin D  - vitamin D (ERGOCALCIFEROL) 1.25 MG (39243 UT) CAPS capsule; Take 1 capsule by mouth once a week  Dispense: 12 capsule; Refill: 1    5. Diabetic polyneuropathy associated with type 2 diabetes mellitus (HCC)  Chronic problem fairly stable continue Neurontin. Continue to have control on diabetes.     6. Acute adjustment disorder with mixed anxiety and depressed mood  Uncontrolled continue Celexa start on Wellbutrin that will also help with smoking. Continue counseling  - buPROPion (WELLBUTRIN XL) 150 MG extended release tablet; Take 1 tablet by mouth every morning  Dispense: 90 tablet; Refill: 3    7. Hypokalemia  Mild hypokalemia start on potassium supplements  - potassium chloride (KLOR-CON M) 10 MEQ extended release tablet; Take 1 tablet by mouth daily  Dispense: 180 tablet; Refill: 1    8. Personal history of tobacco use  Counseling education provided patient is not willing to quit smoking at this time. 9. Screening mammogram for high-risk patient    - MICHAEL DIGITAL SCREEN W OR WO CAD BILATERAL; Future      Orders Placed This Encounter   Procedures    MICHAEL DIGITAL SCREEN W OR WO CAD BILATERAL     Standing Status:   Future     Standing Expiration Date:   4/23/2024     Order Specific Question:   Reason for exam:     Answer:   screening mammogram    POCT glycosylated hemoglobin (Hb A1C)         Medications Discontinued During This Encounter   Medication Reason    dapagliflozin (FARXIGA) 10 MG tablet     ondansetron (ZOFRAN-ODT) 4 MG disintegrating tablet Therapy completed    glyBURIDE (DIABETA) 5 MG tablet Alternate therapy       Al Ventura received counseling on the following healthy behaviors: nutrition, exercise, medication adherence, and tobacco cessation  Reviewed prior labs and health maintenance  Continue current medications, diet and exercise. Discussed use, benefit, and side effects of prescribed medications. Barriers to medication compliance addressed. Patient given educational materials - see patient instructions  Was a self-tracking handout given in paper form or via [facility]?  Yes    Requested Prescriptions     Signed Prescriptions Disp Refills    vitamin D (ERGOCALCIFEROL) 1.25 MG (30575 UT) CAPS capsule 12 capsule 1     Sig: Take 1 capsule by mouth once a week    potassium chloride (KLOR-CON M) 10 MEQ extended release tablet 180 tablet 1     Sig: Take 1 tablet by mouth daily    glimepiride (AMARYL) 4 MG tablet 180 tablet 1     Sig: Take 1 tablet by mouth 2 times daily    blood glucose monitor strips 100 strip 3     Sig: Testing once a day. Please dispense according to patients insurance. Lancets MISC 100 each 3     Sig: Testing once a day. Please dispense according to patients insurance. buPROPion (WELLBUTRIN XL) 150 MG extended release tablet 90 tablet 3     Sig: Take 1 tablet by mouth every morning       All patient questions answered. Patient voiced understanding. Quality Measures    Body mass index is 37.73 kg/m². Elevated. Weight control planned discussed daily exercise regimen and Healthy diet and regular exercise. BP: 110/80 Blood pressure is Normal. Treatment plan consists of Weight Reduction, DASH Eating Plan, Dietary Sodium Restriction, Increased Physical Activity, Moderation in Alcohol Consumption, Avoid Tobacco and Second-hand Smoke, and No treatment change needed. Lab Results   Component Value Date    LDLCALC 78 04/05/2016    LDLCHOLESTEROL 75 02/20/2023    LDLDIRECT 89 01/18/2021    (goal LDL reduction with dx if diabetes is 50% LDL reduction)      PHQ Scores 2/21/2023 2/21/2022 12/28/2021 9/21/2021 6/21/2021 9/16/2020 2/3/2017   PHQ2 Score 0 3 0 0 2 0 0   PHQ9 Score 0 9 0 0 2 0 0     Interpretation of Total Score Depression Severity: 1-4 = Minimal depression, 5-9 = Mild depression, 10-14 = Moderate depression, 15-19 = Moderately severe depression, 20-27 = Severe depression    The patient'spast medical, surgical, social, and family history as well as her   current medications and allergies were reviewed as documented in today's encounter. Medications, labs, diagnostic studies, consultations andfollow-up as documented in this encounter. Return in about 3 months (around 5/21/2023) for dm ,htn, hld. Patient wasseen with total face to face time of 35 minutes. More than 50% of this visit was counseling and education.        Future Appointments   Date Time Provider Alex Carl 5/23/2023 10:00 AM Jimbo Victoria MD [email or web address]     This note was completed by using the assistance of a speech-recognition program. However, inadvertent computerized transcription errors may be present. Althoughevery effort was made to ensure accuracy, no guarantees can be provided that every mistake has been identified and corrected by editing.   Electronically signed by Jimbo Victoria MD on 2/21/2023  9:33 AM

## 2023-02-21 NOTE — PATIENT INSTRUCTIONS
New Updates for [facility] MyChart/ [address] BASIM    Thank you for choosing US to give you the best care! [address] is always trying to think of new ways to help their patients. We are asking all patients to try out the new digital registration that is now available through your [facility] account or the new BASIM, [address] [employer]. Via the basim you're now able to update your personal and registration information prior to your upcoming appointment. This will save you time once you arrive at the office to check-in, not to mention your information remains safe!! Many other perks come from signing up for an account, such as:  Requesting refills  Scheduling an appointment  Completing an E-Visit  Sending a message to the office/provider  Having access to your medication list  Paying your bill/copay prior to your appointment  Scheduling your yearly mammogram  Review your test results    If you are not familiar with [facility] or the [address] [employer] BASIM, please ask one of us and we will be happy to answer any questions or help you set-up your account.       Your [facility] office,  Rajinder

## 2023-02-27 DIAGNOSIS — F33.41 RECURRENT MAJOR DEPRESSIVE DISORDER, IN PARTIAL REMISSION (HCC): ICD-10-CM

## 2023-02-27 DIAGNOSIS — E11.42 TYPE 2 DIABETES MELLITUS WITH DIABETIC POLYNEUROPATHY, WITH LONG-TERM CURRENT USE OF INSULIN (HCC): ICD-10-CM

## 2023-02-27 DIAGNOSIS — F41.9 ANXIETY: ICD-10-CM

## 2023-02-27 DIAGNOSIS — Z79.4 TYPE 2 DIABETES MELLITUS WITH DIABETIC POLYNEUROPATHY, WITH LONG-TERM CURRENT USE OF INSULIN (HCC): ICD-10-CM

## 2023-02-27 RX ORDER — GABAPENTIN 300 MG/1
300 CAPSULE ORAL 3 TIMES DAILY
Qty: 90 CAPSULE | Refills: 0 | Status: SHIPPED | OUTPATIENT
Start: 2023-02-27 | End: 2023-03-29

## 2023-02-27 NOTE — TELEPHONE ENCOUNTER
Please Approve or Refuse.   Send to Pharmacy per Pt's Request:        Next Visit Date:  5/23/2023   Last Visit Date: 2/21/2023    Hemoglobin A1C (%)   Date Value   02/21/2023 9.6   11/22/2022 11.3   02/19/2022 11.1 (H)             ( goal A1C is < 7)   BP Readings from Last 3 Encounters:   02/21/23 110/80   12/04/22 108/72   12/03/22 124/81          (goal 120/80)  BUN   Date Value Ref Range Status   02/20/2023 6 6 - 20 mg/dL Final     Creatinine   Date Value Ref Range Status   02/20/2023 0.54 0.50 - 0.90 mg/dL Final     Potassium   Date Value Ref Range Status   02/20/2023 3.6 (L) 3.7 - 5.3 mmol/L Final

## 2023-02-28 RX ORDER — CITALOPRAM 40 MG/1
40 TABLET ORAL DAILY
Qty: 90 TABLET | Refills: 2 | Status: SHIPPED | OUTPATIENT
Start: 2023-02-28

## 2023-02-28 NOTE — TELEPHONE ENCOUNTER
Please Approve or Refuse.   Send to Pharmacy per Pt's Request: [facility]     Next Visit Date:  5/23/2023   Last Visit Date: 2/21/2023    Hemoglobin A1C (%)   Date Value   02/21/2023 9.6   11/22/2022 11.3   02/19/2022 11.1 (H)             ( goal A1C is < 7)   BP Readings from Last 3 Encounters:   02/21/23 110/80   12/04/22 108/72   12/03/22 124/81          (goal 120/80)  BUN   Date Value Ref Range Status   02/20/2023 6 6 - 20 mg/dL Final     Creatinine   Date Value Ref Range Status   02/20/2023 0.54 0.50 - 0.90 mg/dL Final     Potassium   Date Value Ref Range Status   02/20/2023 3.6 (L) 3.7 - 5.3 mmol/L Final

## 2023-03-26 DIAGNOSIS — Z79.4 TYPE 2 DIABETES MELLITUS WITH DIABETIC POLYNEUROPATHY, WITH LONG-TERM CURRENT USE OF INSULIN (HCC): ICD-10-CM

## 2023-03-26 DIAGNOSIS — E11.42 TYPE 2 DIABETES MELLITUS WITH DIABETIC POLYNEUROPATHY, WITH LONG-TERM CURRENT USE OF INSULIN (HCC): ICD-10-CM

## 2023-03-27 RX ORDER — GABAPENTIN 300 MG/1
300 CAPSULE ORAL 3 TIMES DAILY
Qty: 90 CAPSULE | Refills: 0 | Status: SHIPPED | OUTPATIENT
Start: 2023-03-27 | End: 2023-04-26

## 2023-04-27 DIAGNOSIS — E11.42 TYPE 2 DIABETES MELLITUS WITH DIABETIC POLYNEUROPATHY, WITH LONG-TERM CURRENT USE OF INSULIN (HCC): ICD-10-CM

## 2023-04-27 DIAGNOSIS — Z79.4 TYPE 2 DIABETES MELLITUS WITH DIABETIC POLYNEUROPATHY, WITH LONG-TERM CURRENT USE OF INSULIN (HCC): ICD-10-CM

## 2023-04-27 RX ORDER — GABAPENTIN 300 MG/1
300 CAPSULE ORAL 3 TIMES DAILY
Qty: 90 CAPSULE | Refills: 0 | Status: SHIPPED | OUTPATIENT
Start: 2023-04-27 | End: 2023-05-27

## 2023-05-05 DIAGNOSIS — E78.2 MIXED HYPERLIPIDEMIA: ICD-10-CM

## 2023-05-05 DIAGNOSIS — E11.42 TYPE 2 DIABETES MELLITUS WITH DIABETIC POLYNEUROPATHY, WITH LONG-TERM CURRENT USE OF INSULIN (HCC): ICD-10-CM

## 2023-05-05 DIAGNOSIS — Z79.4 TYPE 2 DIABETES MELLITUS WITH DIABETIC POLYNEUROPATHY, WITH LONG-TERM CURRENT USE OF INSULIN (HCC): ICD-10-CM

## 2023-05-05 NOTE — TELEPHONE ENCOUNTER
Please Approve or Refuse.   Send to Pharmacy per Pt's Request:     Call received from pharmacy stating that pts insurance is requesting a 90 day supply order for patients Insulin NPH Isophane & Regular (HUMULIN 70/30 KWIKPEN) (70-30) 100 UNIT per ML injection pen       Next Visit Date:  5/23/2023   Last Visit Date: 2/21/2023    Hemoglobin A1C (%)   Date Value   02/21/2023 9.6   11/22/2022 11.3   02/19/2022 11.1 (H)             ( goal A1C is < 7)   BP Readings from Last 3 Encounters:   02/21/23 110/80   12/04/22 108/72   12/03/22 124/81          (goal 120/80)  BUN   Date Value Ref Range Status   02/20/2023 6 6 - 20 mg/dL Final     Creatinine   Date Value Ref Range Status   02/20/2023 0.54 0.50 - 0.90 mg/dL Final     Potassium   Date Value Ref Range Status   02/20/2023 3.6 (L) 3.7 - 5.3 mmol/L Final

## 2023-05-06 RX ORDER — INSULIN HUMAN 100 [IU]/ML
INJECTION, SUSPENSION SUBCUTANEOUS
Qty: 90 ML | Refills: 1 | Status: SHIPPED | OUTPATIENT
Start: 2023-05-06

## 2023-05-06 RX ORDER — FENOFIBRATE 145 MG/1
TABLET, COATED ORAL
Qty: 90 TABLET | Refills: 2 | Status: SHIPPED | OUTPATIENT
Start: 2023-05-06

## 2023-05-06 RX ORDER — ATORVASTATIN CALCIUM 40 MG/1
TABLET, FILM COATED ORAL
Qty: 90 TABLET | Refills: 3 | Status: SHIPPED | OUTPATIENT
Start: 2023-05-06

## 2023-05-08 ENCOUNTER — TELEPHONE (OUTPATIENT)
Dept: FAMILY MEDICINE CLINIC | Age: 38
End: 2023-05-08

## 2023-05-08 ENCOUNTER — PATIENT MESSAGE (OUTPATIENT)
Dept: FAMILY MEDICINE CLINIC | Age: 38
End: 2023-05-08

## 2023-05-08 DIAGNOSIS — Z79.4 TYPE 2 DIABETES MELLITUS WITH DIABETIC POLYNEUROPATHY, WITH LONG-TERM CURRENT USE OF INSULIN (HCC): ICD-10-CM

## 2023-05-08 DIAGNOSIS — E11.42 TYPE 2 DIABETES MELLITUS WITH DIABETIC POLYNEUROPATHY, WITH LONG-TERM CURRENT USE OF INSULIN (HCC): ICD-10-CM

## 2023-05-08 RX ORDER — INSULIN HUMAN 100 [IU]/ML
INJECTION, SUSPENSION SUBCUTANEOUS
Qty: 90 ML | Refills: 3 | Status: SHIPPED | OUTPATIENT
Start: 2023-05-08

## 2023-05-08 RX ORDER — INSULIN HUMAN 100 [IU]/ML
INJECTION, SUSPENSION SUBCUTANEOUS
Qty: 30 ML | Refills: 0 | Status: SHIPPED | OUTPATIENT
Start: 2023-05-08 | End: 2023-05-08

## 2023-05-08 NOTE — TELEPHONE ENCOUNTER
Kyle Michele RN notified of critical troponin 38. 1. Pt called in stating that she is on 80 units twice a day of her insulin and pt adv that she needs a 90 day supply submitted or her insurance will not cover it. Pt is contacting [facility] to confirm if they will fill a 90 day supply if not she will have to call around to see who will. Pt states that she cannot go through [facility] because she doesn't have a credit card or a bank account to set up a payment.      Call received from pharmacy for clarification    Pharmacy needs order to read as follow depending on how many units per day pt is to take:     138 ML (90 day supply for 75 units twice daily)    147 ml (90 day supply for 80 units twice a daily)

## 2023-05-08 NOTE — TELEPHONE ENCOUNTER
From: Dinora Brito  To: Dr. Arcelia Jeans: 5/8/2023 6:43 AM EDT  Subject: Mariposa Class dr. Eve Osullivan im out of my insulin and my insurance will only pay for a 90 day supply can you send a script to [facility] [place] thank you.

## 2023-05-08 NOTE — TELEPHONE ENCOUNTER
Please Approve or Refuse.   Send to Pharmacy per Pt's Request: [facility] [address] 90 day supply     Next Visit Date:  5/23/2023   Last Visit Date: 2/21/2023    Hemoglobin A1C (%)   Date Value   02/21/2023 9.6   11/22/2022 11.3   02/19/2022 11.1 (H)             ( goal A1C is < 7)   BP Readings from Last 3 Encounters:   02/21/23 110/80   12/04/22 108/72   12/03/22 124/81          (goal 120/80)  BUN   Date Value Ref Range Status   02/20/2023 6 6 - 20 mg/dL Final     Creatinine   Date Value Ref Range Status   02/20/2023 0.54 0.50 - 0.90 mg/dL Final     Potassium   Date Value Ref Range Status   02/20/2023 3.6 (L) 3.7 - 5.3 mmol/L Final

## 2023-05-19 DIAGNOSIS — E11.42 TYPE 2 DIABETES MELLITUS WITH DIABETIC POLYNEUROPATHY, WITH LONG-TERM CURRENT USE OF INSULIN (HCC): ICD-10-CM

## 2023-05-19 DIAGNOSIS — Z79.4 TYPE 2 DIABETES MELLITUS WITH DIABETIC POLYNEUROPATHY, WITH LONG-TERM CURRENT USE OF INSULIN (HCC): ICD-10-CM

## 2023-05-22 RX ORDER — GLIMEPIRIDE 4 MG/1
TABLET ORAL
Qty: 180 TABLET | Refills: 1 | Status: SHIPPED | OUTPATIENT
Start: 2023-05-22

## 2023-05-22 NOTE — TELEPHONE ENCOUNTER
Please Approve or Refuse.   Send to Pharmacy per Pt's Request: [facility]     Next Visit Date:  5/23/2023   Last Visit Date: 2/21/2023    Hemoglobin A1C (%)   Date Value   02/21/2023 9.6   11/22/2022 11.3   02/19/2022 11.1 (H)             ( goal A1C is < 7)   BP Readings from Last 3 Encounters:   02/21/23 110/80   12/04/22 108/72   12/03/22 124/81          (goal 120/80)  BUN   Date Value Ref Range Status   02/20/2023 6 6 - 20 mg/dL Final     Creatinine   Date Value Ref Range Status   02/20/2023 0.54 0.50 - 0.90 mg/dL Final     Potassium   Date Value Ref Range Status   02/20/2023 3.6 (L) 3.7 - 5.3 mmol/L Final

## 2023-05-23 ENCOUNTER — OFFICE VISIT (OUTPATIENT)
Dept: FAMILY MEDICINE CLINIC | Age: 38
End: 2023-05-23
Payer: COMMERCIAL

## 2023-05-23 VITALS
SYSTOLIC BLOOD PRESSURE: 100 MMHG | DIASTOLIC BLOOD PRESSURE: 60 MMHG | HEIGHT: 63 IN | TEMPERATURE: 97.2 F | WEIGHT: 206 LBS | OXYGEN SATURATION: 99 % | HEART RATE: 78 BPM | BODY MASS INDEX: 36.5 KG/M2

## 2023-05-23 DIAGNOSIS — E55.9 VITAMIN D DEFICIENCY: ICD-10-CM

## 2023-05-23 DIAGNOSIS — E66.09 CLASS 1 OBESITY DUE TO EXCESS CALORIES WITH SERIOUS COMORBIDITY AND BODY MASS INDEX (BMI) OF 33.0 TO 33.9 IN ADULT: ICD-10-CM

## 2023-05-23 DIAGNOSIS — Z79.4 TYPE 2 DIABETES MELLITUS WITH DIABETIC POLYNEUROPATHY, WITH LONG-TERM CURRENT USE OF INSULIN (HCC): Primary | ICD-10-CM

## 2023-05-23 DIAGNOSIS — E11.42 DIABETIC POLYNEUROPATHY ASSOCIATED WITH TYPE 2 DIABETES MELLITUS (HCC): ICD-10-CM

## 2023-05-23 DIAGNOSIS — E87.6 HYPOKALEMIA: ICD-10-CM

## 2023-05-23 DIAGNOSIS — Z87.891 PERSONAL HISTORY OF TOBACCO USE: ICD-10-CM

## 2023-05-23 DIAGNOSIS — E11.42 TYPE 2 DIABETES MELLITUS WITH DIABETIC POLYNEUROPATHY, WITH LONG-TERM CURRENT USE OF INSULIN (HCC): Primary | ICD-10-CM

## 2023-05-23 DIAGNOSIS — F32.4 MAJOR DEPRESSIVE DISORDER WITH SINGLE EPISODE, IN PARTIAL REMISSION (HCC): ICD-10-CM

## 2023-05-23 DIAGNOSIS — E78.2 MIXED HYPERLIPIDEMIA: ICD-10-CM

## 2023-05-23 DIAGNOSIS — I10 ESSENTIAL HYPERTENSION: ICD-10-CM

## 2023-05-23 LAB — HBA1C MFR BLD: 10.3 %

## 2023-05-23 PROCEDURE — 99214 OFFICE O/P EST MOD 30 MIN: CPT | Performed by: FAMILY MEDICINE

## 2023-05-23 PROCEDURE — 83036 HEMOGLOBIN GLYCOSYLATED A1C: CPT | Performed by: FAMILY MEDICINE

## 2023-05-23 PROCEDURE — G8417 CALC BMI ABV UP PARAM F/U: HCPCS | Performed by: FAMILY MEDICINE

## 2023-05-23 PROCEDURE — G8427 DOCREV CUR MEDS BY ELIG CLIN: HCPCS | Performed by: FAMILY MEDICINE

## 2023-05-23 PROCEDURE — 3074F SYST BP LT 130 MM HG: CPT | Performed by: FAMILY MEDICINE

## 2023-05-23 PROCEDURE — 4004F PT TOBACCO SCREEN RCVD TLK: CPT | Performed by: FAMILY MEDICINE

## 2023-05-23 PROCEDURE — 2022F DILAT RTA XM EVC RTNOPTHY: CPT | Performed by: FAMILY MEDICINE

## 2023-05-23 PROCEDURE — 3046F HEMOGLOBIN A1C LEVEL >9.0%: CPT | Performed by: FAMILY MEDICINE

## 2023-05-23 PROCEDURE — 3078F DIAST BP <80 MM HG: CPT | Performed by: FAMILY MEDICINE

## 2023-05-23 RX ORDER — FLURBIPROFEN SODIUM 0.3 MG/ML
1 SOLUTION/ DROPS OPHTHALMIC 2 TIMES DAILY
Qty: 100 EACH | Refills: 1 | Status: SHIPPED | OUTPATIENT
Start: 2023-05-23

## 2023-05-23 RX ORDER — ASPIRIN 81 MG/1
81 TABLET ORAL DAILY
Qty: 90 TABLET | Refills: 1 | Status: SHIPPED | OUTPATIENT
Start: 2023-05-23

## 2023-05-23 ASSESSMENT — ENCOUNTER SYMPTOMS
SORE THROAT: 0
RECTAL PAIN: 0
RHINORRHEA: 0
DIARRHEA: 0
CONSTIPATION: 0
NAUSEA: 0
ABDOMINAL PAIN: 0
STRIDOR: 0
EYE REDNESS: 0
WHEEZING: 0
COUGH: 0
COLOR CHANGE: 0
TROUBLE SWALLOWING: 0
SHORTNESS OF BREATH: 0
BLOOD IN STOOL: 0
ABDOMINAL DISTENTION: 0
CHEST TIGHTNESS: 0
VOMITING: 0
SINUS PRESSURE: 0
BACK PAIN: 1

## 2023-05-23 NOTE — PROGRESS NOTES
Chief Complaint   Patient presents with    Diabetes    Cough    Shortness of Breath     Symptoms started last week     Congestion     Yellow mucous          Ellie Garcia  here today for follow up on chronic medical problems, go over labs and/or diagnostic studies, and medication refills. Diabetes, Cough, Shortness of Breath (Symptoms started last week ), and Congestion (Yellow mucous )      HPI: Patient is scheduled for follow-up on chronic medical conditions and diabetes. Diabetes uncontrolled A1c is increased to 10.3, patient is currently on Januvia and glipizide and is also on Humalog insulin. Patient reports she has not received her medications in last 12 months because of her insurance. Patient reports her insurance was not refilling her insulin, they want her to get 90-day supply and patient was working with pharmacy and her insurance to get this approved. Patient reports she received her medications about 2 weeks before. She does admit that her sugars are running high more than 200. Patient has start taking all her medications. She is currently on 8 units of Humalog insulin twice a day. And also on oral hypoglycemic agents. Hypertension controlled, patient is currently not on any medications. Blood pressures running normal.      Diabetic neuropathy is currently on gabapentin reports compliance denies any side effects from medication. Hyperlipidemia stable on recent blood work. Patient is on Lipitor 40 mg, patient denies any side effects. She denies any myalgias.       The 10-year CVD risk score (D'Agostino, et al., 2008) is: 5.2%    Values used to calculate the score:      Age: 34 years      Sex: Female      Diabetic: Yes      Tobacco smoker: Yes      Systolic Blood Pressure: 401 mmHg      Is BP treated: No      HDL Cholesterol: 26 mg/dL      Total Cholesterol: 145 mg/dL          Obesity worsening, patient has gained weight in the last few months, reports she is trying to watch

## 2023-05-23 NOTE — PROGRESS NOTES
Visit Information    Have you changed or started any medications since your last visit including any over-the-counter medicines, vitamins, or herbal medicines? no   Are you having any side effects from any of your medications? -  no  Have you stopped taking any of your medications? Is so, why? -  no    Have you seen any other physician or provider since your last visit? No  Have you had any other diagnostic tests since your last visit? No  Have you been seen in the emergency room and/or had an admission to a hospital since we last saw you? No  Have you had your routine dental cleaning in the past 6 months? No     Have you activated your [employer] account? If not, what are your barriers?  Yes     Patient Care Team:  Harlan Hubbard MD as PCP - General (Family Medicine)  [facility] Hubbard [facility]    Medical History Review  Past Medical, Family, and Social History reviewed and does contribute to the patient presenting condition    Health Maintenance   Topic Date Due    Pneumococcal 0-64 years Vaccine (1 - PCV) Never done    Hepatitis B vaccine (1 of 3 - Risk 3-dose series) Never done    DTaP/Tdap/Td vaccine (1 - Tdap) Never done    Diabetic retinal exam  06/10/2016    Cervical cancer screen  06/16/2018    COVID-19 Vaccine (3 - Booster for Pfizer series) 10/27/2021    A1C test (Diabetic or Prediabetic)  05/21/2023    Flu vaccine (Season Ended) 08/01/2023    Diabetic foot exam  11/22/2023    Diabetic Alb to Cr ratio (uACR) test  02/20/2024    Lipids  02/20/2024    GFR test (Diabetes, CKD 3-4, OR last GFR 15-59)  02/20/2024    Depression Monitoring  02/21/2024    Hepatitis C screen  Completed    HIV screen  Completed    Hepatitis A vaccine  Aged Out    Hib vaccine  Aged Out    Meningococcal (ACWY) vaccine  Aged Out    Varicella vaccine  Discontinued

## 2023-05-23 NOTE — PATIENT INSTRUCTIONS
Thank you for choosing [facility]) as your healthcare provider as your care is important to us. Please arrive at your appointment on time. If you are unable to make your appointment, please call our office as soon as possible so that we may reschedule your appointment. Missing 3 appointments in a calendar year without notifying the office may lead to dismissal from the practice. We appreciate you calling at least 24 hours in advance, when possible. Thank you. New Updates for [facility]    Thank you for choosing [facility] to give you the best care! [facility]) is always trying to think of new ways to help their patients. We are asking all patients to try out the new digital registration that is now available through your [facility] account Via the basim you're now able to update your personal and registration information prior to your upcoming appointment. This will save you time once you arrive at the office to check-in, not to mention your information remains safe!! Many other perks come from signing up for an account, such as:  Requesting refills  Scheduling an appointment  Completing an E-Visit  Sending a message to the office/provider  Having access to your medication list  Paying your bill/copay prior to your appointment  Scheduling your yearly mammogram  Review your test results    If you are not familiar with [facility] please ask one of us and we will be happy to answer any questions or help you set-up your account.       Your [employer],

## 2023-05-27 DIAGNOSIS — Z79.4 TYPE 2 DIABETES MELLITUS WITH DIABETIC POLYNEUROPATHY, WITH LONG-TERM CURRENT USE OF INSULIN (HCC): ICD-10-CM

## 2023-05-27 DIAGNOSIS — E11.42 TYPE 2 DIABETES MELLITUS WITH DIABETIC POLYNEUROPATHY, WITH LONG-TERM CURRENT USE OF INSULIN (HCC): ICD-10-CM

## 2023-05-30 RX ORDER — GABAPENTIN 300 MG/1
300 CAPSULE ORAL 3 TIMES DAILY
Qty: 90 CAPSULE | Refills: 0 | Status: SHIPPED | OUTPATIENT
Start: 2023-05-30 | End: 2023-06-26

## 2023-06-26 DIAGNOSIS — E11.42 TYPE 2 DIABETES MELLITUS WITH DIABETIC POLYNEUROPATHY, WITH LONG-TERM CURRENT USE OF INSULIN (HCC): ICD-10-CM

## 2023-06-26 DIAGNOSIS — Z79.4 TYPE 2 DIABETES MELLITUS WITH DIABETIC POLYNEUROPATHY, WITH LONG-TERM CURRENT USE OF INSULIN (HCC): ICD-10-CM

## 2023-06-26 RX ORDER — GABAPENTIN 300 MG/1
CAPSULE ORAL
Qty: 90 CAPSULE | Refills: 0 | Status: SHIPPED | OUTPATIENT
Start: 2023-06-26 | End: 2023-08-26

## 2023-06-29 DIAGNOSIS — Z79.4 TYPE 2 DIABETES MELLITUS WITH DIABETIC POLYNEUROPATHY, WITH LONG-TERM CURRENT USE OF INSULIN (HCC): ICD-10-CM

## 2023-06-29 DIAGNOSIS — E11.42 TYPE 2 DIABETES MELLITUS WITH DIABETIC POLYNEUROPATHY, WITH LONG-TERM CURRENT USE OF INSULIN (HCC): ICD-10-CM

## 2023-06-29 RX ORDER — GABAPENTIN 300 MG/1
300 CAPSULE ORAL 3 TIMES DAILY
Qty: 90 CAPSULE | Refills: 0 | OUTPATIENT
Start: 2023-06-29 | End: 2023-08-29

## 2023-06-29 NOTE — TELEPHONE ENCOUNTER
Please Approve or Refuse.   Send to Pharmacy per Pt's Request:      Next Visit Date:  9/12/2023   Last Visit Date: 5/23/2023    Hemoglobin A1C (%)   Date Value   05/23/2023 10.3   02/21/2023 9.6   11/22/2022 11.3             ( goal A1C is < 7)   BP Readings from Last 3 Encounters:   05/23/23 100/60   02/21/23 110/80   12/04/22 108/72          (goal 120/80)  BUN   Date Value Ref Range Status   02/20/2023 6 6 - 20 mg/dL Final     Creatinine   Date Value Ref Range Status   02/20/2023 0.54 0.50 - 0.90 mg/dL Final     Potassium   Date Value Ref Range Status   02/20/2023 3.6 (L) 3.7 - 5.3 mmol/L Final

## 2023-07-05 DIAGNOSIS — Z79.4 TYPE 2 DIABETES MELLITUS WITH DIABETIC POLYNEUROPATHY, WITH LONG-TERM CURRENT USE OF INSULIN (HCC): ICD-10-CM

## 2023-07-05 DIAGNOSIS — E11.42 TYPE 2 DIABETES MELLITUS WITH DIABETIC POLYNEUROPATHY, WITH LONG-TERM CURRENT USE OF INSULIN (HCC): ICD-10-CM

## 2023-07-05 RX ORDER — GABAPENTIN 300 MG/1
300 CAPSULE ORAL 3 TIMES DAILY
Qty: 90 CAPSULE | Refills: 0 | Status: SHIPPED | OUTPATIENT
Start: 2023-07-05 | End: 2023-09-04

## 2023-08-03 DIAGNOSIS — Z79.4 TYPE 2 DIABETES MELLITUS WITH DIABETIC POLYNEUROPATHY, WITH LONG-TERM CURRENT USE OF INSULIN (HCC): ICD-10-CM

## 2023-08-03 DIAGNOSIS — E11.42 TYPE 2 DIABETES MELLITUS WITH DIABETIC POLYNEUROPATHY, WITH LONG-TERM CURRENT USE OF INSULIN (HCC): ICD-10-CM

## 2023-08-04 RX ORDER — GABAPENTIN 300 MG/1
300 CAPSULE ORAL 3 TIMES DAILY
Qty: 90 CAPSULE | Refills: 0 | Status: SHIPPED | OUTPATIENT
Start: 2023-08-04 | End: 2023-10-04

## 2023-08-27 DIAGNOSIS — Z79.4 TYPE 2 DIABETES MELLITUS WITH DIABETIC POLYNEUROPATHY, WITH LONG-TERM CURRENT USE OF INSULIN (HCC): ICD-10-CM

## 2023-08-27 DIAGNOSIS — E11.42 TYPE 2 DIABETES MELLITUS WITH DIABETIC POLYNEUROPATHY, WITH LONG-TERM CURRENT USE OF INSULIN (HCC): ICD-10-CM

## 2023-08-27 DIAGNOSIS — E87.6 HYPOKALEMIA: ICD-10-CM

## 2023-08-28 RX ORDER — POTASSIUM CHLORIDE 750 MG/1
10 TABLET, EXTENDED RELEASE ORAL DAILY
Qty: 180 TABLET | Refills: 1 | Status: SHIPPED | OUTPATIENT
Start: 2023-08-28

## 2023-08-28 RX ORDER — INSULIN HUMAN 100 [IU]/ML
INJECTION, SUSPENSION SUBCUTANEOUS
Qty: 90 ML | Refills: 3 | Status: SHIPPED | OUTPATIENT
Start: 2023-08-28

## 2023-09-08 DIAGNOSIS — E11.42 TYPE 2 DIABETES MELLITUS WITH DIABETIC POLYNEUROPATHY, WITH LONG-TERM CURRENT USE OF INSULIN (HCC): ICD-10-CM

## 2023-09-08 DIAGNOSIS — Z79.4 TYPE 2 DIABETES MELLITUS WITH DIABETIC POLYNEUROPATHY, WITH LONG-TERM CURRENT USE OF INSULIN (HCC): ICD-10-CM

## 2023-09-08 RX ORDER — GABAPENTIN 300 MG/1
300 CAPSULE ORAL 3 TIMES DAILY
Qty: 90 CAPSULE | Refills: 0 | Status: SHIPPED | OUTPATIENT
Start: 2023-09-08 | End: 2023-11-08

## 2023-09-08 NOTE — TELEPHONE ENCOUNTER
Please Approve or Refuse.   Send to Pharmacy per Pt's Request:      Next Visit Date:  10/27/2023   Last Visit Date: 5/23/2023    Hemoglobin A1C (%)   Date Value   05/23/2023 10.3   02/21/2023 9.6   11/22/2022 11.3             ( goal A1C is < 7)   BP Readings from Last 3 Encounters:   05/23/23 100/60   02/21/23 110/80   12/04/22 108/72          (goal 120/80)  BUN   Date Value Ref Range Status   02/20/2023 6 6 - 20 mg/dL Final     Creatinine   Date Value Ref Range Status   02/20/2023 0.54 0.50 - 0.90 mg/dL Final     Potassium   Date Value Ref Range Status   02/20/2023 3.6 (L) 3.7 - 5.3 mmol/L Final

## 2023-10-13 DIAGNOSIS — Z79.4 TYPE 2 DIABETES MELLITUS WITH DIABETIC POLYNEUROPATHY, WITH LONG-TERM CURRENT USE OF INSULIN (HCC): ICD-10-CM

## 2023-10-13 DIAGNOSIS — E11.42 TYPE 2 DIABETES MELLITUS WITH DIABETIC POLYNEUROPATHY, WITH LONG-TERM CURRENT USE OF INSULIN (HCC): ICD-10-CM

## 2023-10-13 RX ORDER — GABAPENTIN 300 MG/1
CAPSULE ORAL
Qty: 90 CAPSULE | Refills: 0 | Status: SHIPPED | OUTPATIENT
Start: 2023-10-13 | End: 2023-10-13 | Stop reason: SDUPTHER

## 2023-10-13 NOTE — TELEPHONE ENCOUNTER
Please Approve or Refuse.   Send to Pharmacy per Pt's Request: [facility]     Next Visit Date:  10/27/2023   Last Visit Date: 5/23/2023    Hemoglobin A1C (%)   Date Value   05/23/2023 10.3   02/21/2023 9.6   11/22/2022 11.3             ( goal A1C is < 7)   BP Readings from Last 3 Encounters:   05/23/23 100/60   02/21/23 110/80   12/04/22 108/72          (goal 120/80)  BUN   Date Value Ref Range Status   02/20/2023 6 6 - 20 mg/dL Final     Creatinine   Date Value Ref Range Status   02/20/2023 0.54 0.50 - 0.90 mg/dL Final     Potassium   Date Value Ref Range Status   02/20/2023 3.6 (L) 3.7 - 5.3 mmol/L Final

## 2023-10-15 RX ORDER — GABAPENTIN 300 MG/1
CAPSULE ORAL
Qty: 90 CAPSULE | Refills: 0 | Status: SHIPPED | OUTPATIENT
Start: 2023-10-15 | End: 2023-11-15

## 2023-10-27 ENCOUNTER — OFFICE VISIT (OUTPATIENT)
Dept: FAMILY MEDICINE CLINIC | Age: 38
End: 2023-10-27
Payer: COMMERCIAL

## 2023-10-27 VITALS
HEART RATE: 96 BPM | BODY MASS INDEX: 39.9 KG/M2 | HEIGHT: 63 IN | OXYGEN SATURATION: 98 % | SYSTOLIC BLOOD PRESSURE: 118 MMHG | WEIGHT: 225.2 LBS | DIASTOLIC BLOOD PRESSURE: 76 MMHG | TEMPERATURE: 97 F

## 2023-10-27 DIAGNOSIS — Z87.891 PERSONAL HISTORY OF TOBACCO USE: ICD-10-CM

## 2023-10-27 DIAGNOSIS — E11.42 TYPE 2 DIABETES MELLITUS WITH DIABETIC POLYNEUROPATHY, WITH LONG-TERM CURRENT USE OF INSULIN (HCC): Primary | ICD-10-CM

## 2023-10-27 DIAGNOSIS — E66.09 CLASS 1 OBESITY DUE TO EXCESS CALORIES WITH SERIOUS COMORBIDITY AND BODY MASS INDEX (BMI) OF 33.0 TO 33.9 IN ADULT: ICD-10-CM

## 2023-10-27 DIAGNOSIS — R60.9 PERIPHERAL EDEMA: ICD-10-CM

## 2023-10-27 DIAGNOSIS — E11.42 DIABETIC POLYNEUROPATHY ASSOCIATED WITH TYPE 2 DIABETES MELLITUS (HCC): ICD-10-CM

## 2023-10-27 DIAGNOSIS — E78.2 MIXED HYPERLIPIDEMIA: ICD-10-CM

## 2023-10-27 DIAGNOSIS — E87.6 HYPOKALEMIA: ICD-10-CM

## 2023-10-27 DIAGNOSIS — Z79.4 TYPE 2 DIABETES MELLITUS WITH DIABETIC POLYNEUROPATHY, WITH LONG-TERM CURRENT USE OF INSULIN (HCC): Primary | ICD-10-CM

## 2023-10-27 DIAGNOSIS — E55.9 VITAMIN D DEFICIENCY: ICD-10-CM

## 2023-10-27 LAB — HBA1C MFR BLD: 10.1 %

## 2023-10-27 PROCEDURE — 4004F PT TOBACCO SCREEN RCVD TLK: CPT | Performed by: FAMILY MEDICINE

## 2023-10-27 PROCEDURE — 3074F SYST BP LT 130 MM HG: CPT | Performed by: FAMILY MEDICINE

## 2023-10-27 PROCEDURE — 83036 HEMOGLOBIN GLYCOSYLATED A1C: CPT | Performed by: FAMILY MEDICINE

## 2023-10-27 PROCEDURE — G8417 CALC BMI ABV UP PARAM F/U: HCPCS | Performed by: FAMILY MEDICINE

## 2023-10-27 PROCEDURE — 99214 OFFICE O/P EST MOD 30 MIN: CPT | Performed by: FAMILY MEDICINE

## 2023-10-27 PROCEDURE — 3046F HEMOGLOBIN A1C LEVEL >9.0%: CPT | Performed by: FAMILY MEDICINE

## 2023-10-27 PROCEDURE — 2022F DILAT RTA XM EVC RTNOPTHY: CPT | Performed by: FAMILY MEDICINE

## 2023-10-27 PROCEDURE — G8427 DOCREV CUR MEDS BY ELIG CLIN: HCPCS | Performed by: FAMILY MEDICINE

## 2023-10-27 PROCEDURE — G8484 FLU IMMUNIZE NO ADMIN: HCPCS | Performed by: FAMILY MEDICINE

## 2023-10-27 PROCEDURE — 3078F DIAST BP <80 MM HG: CPT | Performed by: FAMILY MEDICINE

## 2023-10-27 RX ORDER — GLIMEPIRIDE 4 MG/1
4 TABLET ORAL 2 TIMES DAILY
Qty: 180 TABLET | Refills: 1 | Status: SHIPPED | OUTPATIENT
Start: 2023-10-27

## 2023-10-27 RX ORDER — ERGOCALCIFEROL 1.25 MG/1
50000 CAPSULE ORAL WEEKLY
Qty: 12 CAPSULE | Refills: 1 | Status: SHIPPED | OUTPATIENT
Start: 2023-10-27

## 2023-10-27 RX ORDER — FUROSEMIDE 20 MG/1
20 TABLET ORAL DAILY
Qty: 60 TABLET | Refills: 3 | Status: SHIPPED | OUTPATIENT
Start: 2023-10-27

## 2023-10-27 SDOH — ECONOMIC STABILITY: FOOD INSECURITY: WITHIN THE PAST 12 MONTHS, YOU WORRIED THAT YOUR FOOD WOULD RUN OUT BEFORE YOU GOT MONEY TO BUY MORE.: SOMETIMES TRUE

## 2023-10-27 SDOH — ECONOMIC STABILITY: FOOD INSECURITY: WITHIN THE PAST 12 MONTHS, THE FOOD YOU BOUGHT JUST DIDN'T LAST AND YOU DIDN'T HAVE MONEY TO GET MORE.: SOMETIMES TRUE

## 2023-10-27 SDOH — ECONOMIC STABILITY: INCOME INSECURITY: HOW HARD IS IT FOR YOU TO PAY FOR THE VERY BASICS LIKE FOOD, HOUSING, MEDICAL CARE, AND HEATING?: SOMEWHAT HARD

## 2023-10-27 ASSESSMENT — PATIENT HEALTH QUESTIONNAIRE - PHQ9
4. FEELING TIRED OR HAVING LITTLE ENERGY: 3
1. LITTLE INTEREST OR PLEASURE IN DOING THINGS: 0
8. MOVING OR SPEAKING SO SLOWLY THAT OTHER PEOPLE COULD HAVE NOTICED. OR THE OPPOSITE, BEING SO FIGETY OR RESTLESS THAT YOU HAVE BEEN MOVING AROUND A LOT MORE THAN USUAL: 0
3. TROUBLE FALLING OR STAYING ASLEEP: 3
9. THOUGHTS THAT YOU WOULD BE BETTER OFF DEAD, OR OF HURTING YOURSELF: 0
SUM OF ALL RESPONSES TO PHQ QUESTIONS 1-9: 7
5. POOR APPETITE OR OVEREATING: 1
SUM OF ALL RESPONSES TO PHQ QUESTIONS 1-9: 7
7. TROUBLE CONCENTRATING ON THINGS, SUCH AS READING THE NEWSPAPER OR WATCHING TELEVISION: 0
SUM OF ALL RESPONSES TO PHQ QUESTIONS 1-9: 7
SUM OF ALL RESPONSES TO PHQ9 QUESTIONS 1 & 2: 0
SUM OF ALL RESPONSES TO PHQ QUESTIONS 1-9: 7
2. FEELING DOWN, DEPRESSED OR HOPELESS: 0
10. IF YOU CHECKED OFF ANY PROBLEMS, HOW DIFFICULT HAVE THESE PROBLEMS MADE IT FOR YOU TO DO YOUR WORK, TAKE CARE OF THINGS AT HOME, OR GET ALONG WITH OTHER PEOPLE: 2
6. FEELING BAD ABOUT YOURSELF - OR THAT YOU ARE A FAILURE OR HAVE LET YOURSELF OR YOUR FAMILY DOWN: 0

## 2023-10-27 ASSESSMENT — ENCOUNTER SYMPTOMS
DIARRHEA: 0
RHINORRHEA: 0
BACK PAIN: 0
CHEST TIGHTNESS: 0
BLOOD IN STOOL: 0
ABDOMINAL DISTENTION: 0
VOMITING: 0
STRIDOR: 0
SORE THROAT: 0
NAUSEA: 0
CONSTIPATION: 0
TROUBLE SWALLOWING: 0
ABDOMINAL PAIN: 0
COLOR CHANGE: 0
EYE REDNESS: 0
WHEEZING: 0
RECTAL PAIN: 0
SINUS PRESSURE: 0
COUGH: 0
SHORTNESS OF BREATH: 0

## 2023-10-27 ASSESSMENT — COLUMBIA-SUICIDE SEVERITY RATING SCALE - C-SSRS
5. HAVE YOU STARTED TO WORK OUT OR WORKED OUT THE DETAILS OF HOW TO KILL YOURSELF? DO YOU INTEND TO CARRY OUT THIS PLAN?: NO
7. DID THIS OCCUR IN THE LAST THREE MONTHS: NO
3. HAVE YOU BEEN THINKING ABOUT HOW YOU MIGHT KILL YOURSELF?: NO
4. HAVE YOU HAD THESE THOUGHTS AND HAD SOME INTENTION OF ACTING ON THEM?: NO

## 2023-10-27 NOTE — PROGRESS NOTES
Visit Information    Have you changed or started any medications since your last visit including any over-the-counter medicines, vitamins, or herbal medicines? no   Have you stopped taking any of your medications? Is so, why? -  no  Are you having any side effects from any of your medications? - no    Have you seen any other physician or provider since your last visit?  no   Have you had any other diagnostic tests since your last visit?  no   Have you been seen in the emergency room and/or had an admission in a hospital since we last saw you?  no   Have you had your routine dental cleaning in the past 6 months?  no     Do you have an active MyChart account? If no, what is the barrier?   Yes    Patient Care Team:  Kasandra Walker MD as PCP - General (Family Medicine)  Kasandra Walker MD as PCP - Empaneled Provider    Medical History Review  Past Medical, Family, and Social History reviewed and does contribute to the patient presenting condition    Health Maintenance   Topic Date Due    Hepatitis B vaccine (1 of 3 - 3-dose series) Never done    Pneumococcal 0-64 years Vaccine (1 - PCV) Never done    DTaP/Tdap/Td vaccine (1 - Tdap) Never done    Diabetic retinal exam  06/10/2016    Cervical cancer screen  06/16/2018    COVID-19 Vaccine (3 - Pfizer series) 10/27/2021    Flu vaccine (1) 08/01/2023    A1C test (Diabetic or Prediabetic)  08/23/2023    Diabetic foot exam  11/22/2023    Diabetic Alb to Cr ratio (uACR) test  02/20/2024    Lipids  02/20/2024    GFR test (Diabetes, CKD 3-4, OR last GFR 15-59)  02/20/2024    Depression Monitoring  02/21/2024    Hepatitis C screen  Completed    HIV screen  Completed    Hepatitis A vaccine  Aged Out    Hib vaccine  Aged Out    HPV vaccine  Aged Out    Meningococcal (ACWY) vaccine  Aged Out    Varicella vaccine  Discontinued
PM 12/28/2021    10:05 AM 9/21/2021     2:36 PM 6/21/2021    11:24 AM 9/16/2020    11:57 AM   PHQ Scores   PHQ2 Score 0 0 3 0 0 2 0   PHQ9 Score 7 0 9 0 0 2 0     Interpretation of Total Score Depression Severity: 1-4 = Minimal depression, 5-9 = Mild depression, 10-14 = Moderate depression, 15-19 = Moderately severe depression, 20-27 = Severe depression    The patient'spast medical, surgical, social, and family history as well as her   current medications and allergies were reviewed as documented in today's encounter. Medications, labs, diagnostic studies, consultations andfollow-up as documented in this encounter. Return in about 4 weeks (around 11/24/2023) for lab work, leg swelling . Patient wasseen with total face to face time of 35 minutes. More than 50% of this visit was counseling and education. No future appointments. This note was completed by using the assistance of a speech-recognition program. However, inadvertent computerized transcription errors may be present. Althoughevery effort was made to ensure accuracy, no guarantees can be provided that every mistake has been identified and corrected by editing.   Electronically signed by Asa Macario MD on 10/27/2023  1:51 PM

## 2023-10-27 NOTE — PATIENT INSTRUCTIONS
Thank you for choosing [facility]) as your healthcare provider as your care is important to us. Please arrive at your appointment on time. If you are unable to make your appointment, please call our office as soon as possible so that we may reschedule your appointment. Missing 3 appointments in a calendar year without notifying the office may lead to dismissal from the practice. We appreciate you calling at least 24 hours in advance, when possible. Thank you. New Updates for [facility]    Thank you for choosing [facility] to give you the best care! [facility]) is always trying to think of new ways to help their patients. We are asking all patients to try out the new digital registration that is now available through your [facility] account Via the basim you're now able to update your personal and registration information prior to your upcoming appointment. This will save you time once you arrive at the office to check-in, not to mention your information remains safe!! Many other perks come from signing up for an account, such as:  Requesting refills  Scheduling an appointment  Completing an E-Visit  Sending a message to the office/provider  Having access to your medication list  Paying your bill/copay prior to your appointment  Scheduling your yearly mammogram  Review your test results    If you are not familiar with [facility] please ask one of us and we will be happy to answer any questions or help you set-up your account.       Your [employer],

## 2023-12-14 DIAGNOSIS — E11.42 TYPE 2 DIABETES MELLITUS WITH DIABETIC POLYNEUROPATHY, WITH LONG-TERM CURRENT USE OF INSULIN (HCC): ICD-10-CM

## 2023-12-14 DIAGNOSIS — Z79.4 TYPE 2 DIABETES MELLITUS WITH DIABETIC POLYNEUROPATHY, WITH LONG-TERM CURRENT USE OF INSULIN (HCC): ICD-10-CM

## 2023-12-14 RX ORDER — INSULIN HUMAN 100 [IU]/ML
INJECTION, SUSPENSION SUBCUTANEOUS
Qty: 90 ML | Refills: 3 | Status: SHIPPED | OUTPATIENT
Start: 2023-12-14

## 2023-12-14 NOTE — TELEPHONE ENCOUNTER
Please Approve or Refuse.   Send to Pharmacy per Pt's Request:      Next Visit Date:  Visit date not found   Last Visit Date: 10/27/2023    Hemoglobin A1C (%)   Date Value   10/27/2023 10.1   05/23/2023 10.3   02/21/2023 9.6             ( goal A1C is < 7)   BP Readings from Last 3 Encounters:   10/27/23 118/76   05/23/23 100/60   02/21/23 110/80          (goal 120/80)  BUN   Date Value Ref Range Status   02/20/2023 6 6 - 20 mg/dL Final     Creatinine   Date Value Ref Range Status   02/20/2023 0.54 0.50 - 0.90 mg/dL Final     Potassium   Date Value Ref Range Status   02/20/2023 3.6 (L) 3.7 - 5.3 mmol/L Final

## 2024-01-24 DIAGNOSIS — E11.42 TYPE 2 DIABETES MELLITUS WITH DIABETIC POLYNEUROPATHY, WITH LONG-TERM CURRENT USE OF INSULIN (HCC): ICD-10-CM

## 2024-01-24 DIAGNOSIS — Z79.4 TYPE 2 DIABETES MELLITUS WITH DIABETIC POLYNEUROPATHY, WITH LONG-TERM CURRENT USE OF INSULIN (HCC): ICD-10-CM

## 2024-01-24 RX ORDER — GABAPENTIN 300 MG/1
CAPSULE ORAL
Qty: 90 CAPSULE | Refills: 0 | Status: SHIPPED | OUTPATIENT
Start: 2024-01-24 | End: 2024-01-25 | Stop reason: SDUPTHER

## 2024-01-24 NOTE — TELEPHONE ENCOUNTER
Please Approve or Refuse.  Send to Pharmacy per Pt's Request: [facility]     Next Visit Date:  called pt lvm to call office back to schedule visit  Last Visit Date: 10/27/2023    Hemoglobin A1C (%)   Date Value   10/27/2023 10.1   05/23/2023 10.3   02/21/2023 9.6             ( goal A1C is < 7)   BP Readings from Last 3 Encounters:   10/27/23 118/76   05/23/23 100/60   02/21/23 110/80          (goal 120/80)  BUN   Date Value Ref Range Status   02/20/2023 6 6 - 20 mg/dL Final     Creatinine   Date Value Ref Range Status   02/20/2023 0.54 0.50 - 0.90 mg/dL Final     Potassium   Date Value Ref Range Status   02/20/2023 3.6 (L) 3.7 - 5.3 mmol/L Final              Hemostasis: Aluminum Chloride

## 2024-01-25 DIAGNOSIS — E11.42 TYPE 2 DIABETES MELLITUS WITH DIABETIC POLYNEUROPATHY, WITH LONG-TERM CURRENT USE OF INSULIN (HCC): ICD-10-CM

## 2024-01-25 DIAGNOSIS — Z79.4 TYPE 2 DIABETES MELLITUS WITH DIABETIC POLYNEUROPATHY, WITH LONG-TERM CURRENT USE OF INSULIN (HCC): ICD-10-CM

## 2024-01-25 RX ORDER — GABAPENTIN 300 MG/1
300 CAPSULE ORAL 3 TIMES DAILY
Qty: 90 CAPSULE | Refills: 0 | Status: SHIPPED | OUTPATIENT
Start: 2024-01-25 | End: 2024-02-24

## 2024-02-01 ENCOUNTER — TELEPHONE (OUTPATIENT)
Dept: FAMILY MEDICINE CLINIC | Age: 39
End: 2024-02-01

## 2024-02-01 NOTE — TELEPHONE ENCOUNTER
Patient can increase her insulin Levemir to 35 to 40 units, she is already on glimepiride.  She cannot tolerate metformin, she has refused that in the past.  Patient needs appointment for A1c recheck

## 2024-02-01 NOTE — TELEPHONE ENCOUNTER
Januvia is no longer covered by patients insurance. What is covered: Metformin, Metformin ER, Glipizide-Metformin, Glyburide-Metformin.

## 2024-02-02 NOTE — TELEPHONE ENCOUNTER
PT STATES SHE DOES NOT TAKE THE LEVIMER ONLY LANTUS SHE DOES HAVE AN APPOINTMENT AND STATES SHE WILL DISCUSS THIS THEN

## 2024-04-15 ASSESSMENT — PATIENT HEALTH QUESTIONNAIRE - PHQ9
SUM OF ALL RESPONSES TO PHQ QUESTIONS 1-9: 12
3. TROUBLE FALLING OR STAYING ASLEEP: NEARLY EVERY DAY
4. FEELING TIRED OR HAVING LITTLE ENERGY: NEARLY EVERY DAY
SUM OF ALL RESPONSES TO PHQ QUESTIONS 1-9: 12
5. POOR APPETITE OR OVEREATING: NEARLY EVERY DAY
8. MOVING OR SPEAKING SO SLOWLY THAT OTHER PEOPLE COULD HAVE NOTICED. OR THE OPPOSITE, BEING SO FIGETY OR RESTLESS THAT YOU HAVE BEEN MOVING AROUND A LOT MORE THAN USUAL: NOT AT ALL
1. LITTLE INTEREST OR PLEASURE IN DOING THINGS: SEVERAL DAYS
6. FEELING BAD ABOUT YOURSELF - OR THAT YOU ARE A FAILURE OR HAVE LET YOURSELF OR YOUR FAMILY DOWN: NOT AT ALL
3. TROUBLE FALLING OR STAYING ASLEEP: NEARLY EVERY DAY
9. THOUGHTS THAT YOU WOULD BE BETTER OFF DEAD, OR OF HURTING YOURSELF: NOT AT ALL
SUM OF ALL RESPONSES TO PHQ QUESTIONS 1-9: 12
9. THOUGHTS THAT YOU WOULD BE BETTER OFF DEAD, OR OF HURTING YOURSELF: NOT AT ALL
10. IF YOU CHECKED OFF ANY PROBLEMS, HOW DIFFICULT HAVE THESE PROBLEMS MADE IT FOR YOU TO DO YOUR WORK, TAKE CARE OF THINGS AT HOME, OR GET ALONG WITH OTHER PEOPLE: SOMEWHAT DIFFICULT
6. FEELING BAD ABOUT YOURSELF - OR THAT YOU ARE A FAILURE OR HAVE LET YOURSELF OR YOUR FAMILY DOWN: NOT AT ALL
10. IF YOU CHECKED OFF ANY PROBLEMS, HOW DIFFICULT HAVE THESE PROBLEMS MADE IT FOR YOU TO DO YOUR WORK, TAKE CARE OF THINGS AT HOME, OR GET ALONG WITH OTHER PEOPLE: SOMEWHAT DIFFICULT
2. FEELING DOWN, DEPRESSED OR HOPELESS: SEVERAL DAYS
1. LITTLE INTEREST OR PLEASURE IN DOING THINGS: SEVERAL DAYS
4. FEELING TIRED OR HAVING LITTLE ENERGY: NEARLY EVERY DAY
SUM OF ALL RESPONSES TO PHQ9 QUESTIONS 1 & 2: 2
5. POOR APPETITE OR OVEREATING: NEARLY EVERY DAY
7. TROUBLE CONCENTRATING ON THINGS, SUCH AS READING THE NEWSPAPER OR WATCHING TELEVISION: SEVERAL DAYS
SUM OF ALL RESPONSES TO PHQ QUESTIONS 1-9: 12
2. FEELING DOWN, DEPRESSED OR HOPELESS: SEVERAL DAYS
7. TROUBLE CONCENTRATING ON THINGS, SUCH AS READING THE NEWSPAPER OR WATCHING TELEVISION: SEVERAL DAYS
8. MOVING OR SPEAKING SO SLOWLY THAT OTHER PEOPLE COULD HAVE NOTICED. OR THE OPPOSITE - BEING SO FIDGETY OR RESTLESS THAT YOU HAVE BEEN MOVING AROUND A LOT MORE THAN USUAL: NOT AT ALL
SUM OF ALL RESPONSES TO PHQ QUESTIONS 1-9: 12

## 2024-04-16 ENCOUNTER — TELEPHONE (OUTPATIENT)
Dept: FAMILY MEDICINE CLINIC | Age: 39
End: 2024-04-16

## 2024-04-16 DIAGNOSIS — E11.42 TYPE 2 DIABETES MELLITUS WITH DIABETIC POLYNEUROPATHY, WITH LONG-TERM CURRENT USE OF INSULIN (HCC): ICD-10-CM

## 2024-04-16 DIAGNOSIS — Z79.4 TYPE 2 DIABETES MELLITUS WITH DIABETIC POLYNEUROPATHY, WITH LONG-TERM CURRENT USE OF INSULIN (HCC): ICD-10-CM

## 2024-04-16 NOTE — TELEPHONE ENCOUNTER
Pharmacy med-[facility] called and they will like a  *PRIOR AUTHORIZATION* started in regard to medication listed below. I DID start *PRIOR AUTHORIZATION* through *[employer]* to get medication approved through insurance.    Also will like an alternative, is requesting an alternative medication in the mean time. Until we get a clarification on a approval/denial from insurance company for medication.     Please call into pharmacy: [facility] .  Thank you!

## 2024-04-17 ENCOUNTER — TELEPHONE (OUTPATIENT)
Dept: FAMILY MEDICINE CLINIC | Age: 39
End: 2024-04-17

## 2024-04-17 ENCOUNTER — OFFICE VISIT (OUTPATIENT)
Dept: FAMILY MEDICINE CLINIC | Age: 39
End: 2024-04-17
Payer: COMMERCIAL

## 2024-04-17 VITALS
OXYGEN SATURATION: 99 % | DIASTOLIC BLOOD PRESSURE: 60 MMHG | SYSTOLIC BLOOD PRESSURE: 110 MMHG | HEART RATE: 98 BPM | TEMPERATURE: 97.5 F | HEIGHT: 63 IN | WEIGHT: 207 LBS | BODY MASS INDEX: 36.68 KG/M2

## 2024-04-17 DIAGNOSIS — R60.0 PERIPHERAL EDEMA: ICD-10-CM

## 2024-04-17 DIAGNOSIS — E78.2 MIXED HYPERLIPIDEMIA: ICD-10-CM

## 2024-04-17 DIAGNOSIS — F41.9 ANXIETY: ICD-10-CM

## 2024-04-17 DIAGNOSIS — F51.02 ADJUSTMENT INSOMNIA: ICD-10-CM

## 2024-04-17 DIAGNOSIS — Z79.4 TYPE 2 DIABETES MELLITUS WITH DIABETIC POLYNEUROPATHY, WITH LONG-TERM CURRENT USE OF INSULIN (HCC): Primary | ICD-10-CM

## 2024-04-17 DIAGNOSIS — E11.42 DIABETIC POLYNEUROPATHY ASSOCIATED WITH TYPE 2 DIABETES MELLITUS (HCC): ICD-10-CM

## 2024-04-17 DIAGNOSIS — I10 ESSENTIAL HYPERTENSION: ICD-10-CM

## 2024-04-17 DIAGNOSIS — F33.41 RECURRENT MAJOR DEPRESSIVE DISORDER, IN PARTIAL REMISSION (HCC): ICD-10-CM

## 2024-04-17 DIAGNOSIS — E55.9 VITAMIN D DEFICIENCY: ICD-10-CM

## 2024-04-17 DIAGNOSIS — E87.6 HYPOKALEMIA: ICD-10-CM

## 2024-04-17 DIAGNOSIS — E11.42 TYPE 2 DIABETES MELLITUS WITH DIABETIC POLYNEUROPATHY, WITH LONG-TERM CURRENT USE OF INSULIN (HCC): Primary | ICD-10-CM

## 2024-04-17 LAB — HBA1C MFR BLD: 11.4 %

## 2024-04-17 PROCEDURE — 2022F DILAT RTA XM EVC RTNOPTHY: CPT | Performed by: FAMILY MEDICINE

## 2024-04-17 PROCEDURE — G8417 CALC BMI ABV UP PARAM F/U: HCPCS | Performed by: FAMILY MEDICINE

## 2024-04-17 PROCEDURE — 3046F HEMOGLOBIN A1C LEVEL >9.0%: CPT | Performed by: FAMILY MEDICINE

## 2024-04-17 PROCEDURE — G8427 DOCREV CUR MEDS BY ELIG CLIN: HCPCS | Performed by: FAMILY MEDICINE

## 2024-04-17 PROCEDURE — 83036 HEMOGLOBIN GLYCOSYLATED A1C: CPT | Performed by: FAMILY MEDICINE

## 2024-04-17 PROCEDURE — 4004F PT TOBACCO SCREEN RCVD TLK: CPT | Performed by: FAMILY MEDICINE

## 2024-04-17 PROCEDURE — 99214 OFFICE O/P EST MOD 30 MIN: CPT | Performed by: FAMILY MEDICINE

## 2024-04-17 PROCEDURE — 3078F DIAST BP <80 MM HG: CPT | Performed by: FAMILY MEDICINE

## 2024-04-17 PROCEDURE — 3074F SYST BP LT 130 MM HG: CPT | Performed by: FAMILY MEDICINE

## 2024-04-17 RX ORDER — GLIMEPIRIDE 4 MG/1
4 TABLET ORAL 2 TIMES DAILY
Qty: 180 TABLET | Refills: 1 | Status: SHIPPED | OUTPATIENT
Start: 2024-04-17

## 2024-04-17 RX ORDER — CITALOPRAM 40 MG/1
40 TABLET ORAL DAILY
Qty: 90 TABLET | Refills: 2 | Status: SHIPPED | OUTPATIENT
Start: 2024-04-17

## 2024-04-17 RX ORDER — MIRTAZAPINE 15 MG/1
15 TABLET, FILM COATED ORAL NIGHTLY
Qty: 30 TABLET | Refills: 3 | Status: SHIPPED | OUTPATIENT
Start: 2024-04-17

## 2024-04-17 RX ORDER — ERGOCALCIFEROL 1.25 MG/1
50000 CAPSULE ORAL WEEKLY
Qty: 12 CAPSULE | Refills: 1 | Status: SHIPPED | OUTPATIENT
Start: 2024-04-17

## 2024-04-17 RX ORDER — DAPAGLIFLOZIN 10 MG/1
10 TABLET, FILM COATED ORAL EVERY MORNING
Qty: 90 TABLET | Refills: 1 | Status: SHIPPED | OUTPATIENT
Start: 2024-04-17

## 2024-04-17 RX ORDER — HYDROXYZINE PAMOATE 25 MG/1
25 CAPSULE ORAL 3 TIMES DAILY PRN
Qty: 90 CAPSULE | Refills: 1 | Status: SHIPPED | OUTPATIENT
Start: 2024-04-17 | End: 2024-06-16

## 2024-04-17 RX ORDER — FENOFIBRATE 145 MG/1
145 TABLET, COATED ORAL DAILY
Qty: 90 TABLET | Refills: 2 | Status: SHIPPED | OUTPATIENT
Start: 2024-04-17

## 2024-04-17 RX ORDER — ATORVASTATIN CALCIUM 40 MG/1
40 TABLET, FILM COATED ORAL DAILY
Qty: 90 TABLET | Refills: 3 | Status: SHIPPED | OUTPATIENT
Start: 2024-04-17

## 2024-04-17 SDOH — ECONOMIC STABILITY: FOOD INSECURITY: WITHIN THE PAST 12 MONTHS, YOU WORRIED THAT YOUR FOOD WOULD RUN OUT BEFORE YOU GOT MONEY TO BUY MORE.: NEVER TRUE

## 2024-04-17 SDOH — ECONOMIC STABILITY: FOOD INSECURITY: WITHIN THE PAST 12 MONTHS, THE FOOD YOU BOUGHT JUST DIDN'T LAST AND YOU DIDN'T HAVE MONEY TO GET MORE.: NEVER TRUE

## 2024-04-17 SDOH — ECONOMIC STABILITY: INCOME INSECURITY: HOW HARD IS IT FOR YOU TO PAY FOR THE VERY BASICS LIKE FOOD, HOUSING, MEDICAL CARE, AND HEATING?: NOT HARD AT ALL

## 2024-04-17 ASSESSMENT — ENCOUNTER SYMPTOMS
CONSTIPATION: 0
VOMITING: 0
SINUS PRESSURE: 0
SORE THROAT: 0
TROUBLE SWALLOWING: 0
RECTAL PAIN: 0
COLOR CHANGE: 0
SHORTNESS OF BREATH: 0
DIARRHEA: 0
WHEEZING: 0
STRIDOR: 0
ABDOMINAL PAIN: 0
NAUSEA: 0
RHINORRHEA: 0
BACK PAIN: 1
EYE REDNESS: 0
ABDOMINAL DISTENTION: 0
CHEST TIGHTNESS: 0
BLOOD IN STOOL: 0
COUGH: 0

## 2024-04-17 NOTE — PROGRESS NOTES
Visit Information    Have you changed or started any medications since your last visit including any over-the-counter medicines, vitamins, or herbal medicines? no   Are you having any side effects from any of your medications? -  no  Have you stopped taking any of your medications? Is so, why? -  no    Have you seen any other physician or provider since your last visit? No  Have you had any other diagnostic tests since your last visit? No  Have you been seen in the emergency room and/or had an admission to a hospital since we last saw you? No  Have you had your routine dental cleaning in the past 6 months? yes -     Have you activated your [employer] account? If not, what are your barriers? Yes     Patient Care Team:  Chris Celis MD as PCP - General (Family Medicine)  Chris Celis MD as PCP - Empaneled Provider    Medical History Review  Past Medical, Family, and Social History reviewed and does contribute to the patient presenting condition    Health Maintenance   Topic Date Due    Hepatitis B vaccine (1 of 3 - 3-dose series) Never done    Pneumococcal 0-64 years Vaccine (1 of 2 - PCV) Never done    DTaP/Tdap/Td vaccine (1 - Tdap) Never done    Diabetic retinal exam  06/10/2016    Cervical cancer screen  06/16/2018    COVID-19 Vaccine (3 - 2023-24 season) 09/01/2023    A1C test (Diabetic or Prediabetic)  01/27/2024    Diabetic Alb to Cr ratio (uACR) test  02/20/2024    Lipids  02/20/2024    GFR test (Diabetes, CKD 3-4, OR last GFR 15-59)  02/20/2024    Flu vaccine (Season Ended) 08/01/2024    Diabetic foot exam  10/27/2024    Depression Monitoring  04/15/2025    Hepatitis C screen  Completed    HIV screen  Completed    Hepatitis A vaccine  Aged Out    Hib vaccine  Aged Out    HPV vaccine  Aged Out    Polio vaccine  Aged Out    Meningococcal (ACWY) vaccine  Aged Out    Varicella vaccine  Discontinued

## 2024-04-17 NOTE — TELEPHONE ENCOUNTER
It looks insurance is not covering Humulin, patient is currently on Levemir so I increase the dose of Levemir to 80 units.

## 2024-04-17 NOTE — TELEPHONE ENCOUNTER
Response to cancel request received from pharmacy.  Received: Yesterday  Lj, Surescripts In  P [facility] Clinical Support Pool  The pharmacy received the electronic cancel request, but could not cancel the prescription. Additional follow up tasks may be necessary based on the pharmacy response noted below.    Pharmacy Note: Some or all of Rx dispensed; Last fill:03/11/24          Pharmacy    [id number] - [place], [place] [facility] [phone] - F [phone]   [address] [place] [id number]   Phone: [phone] Fax: [phone]   Hours: Not open 24 hours     Outpatient Medication Detail     Disp Refills Start End    Insulin NPH Isophane & Regular (HUMULIN 70/30 KWIKPEN) (70-30) 100 UNIT per ML injection pen (Discontinued) 90 mL 3 12/14/2023 4/16/2024    Sig: inject 80 units subcutaneously twice a day    Sent to pharmacy as: HumuLIN 70/30 KwikPen (70-30) 100 UNIT/ML Suspension Pen-injector (Insulin NPH Isophane & Regular)    Reason for Discontinue: Alternate therapy    E-Prescribing Status: Receipt confirmed by pharmacy (12/14/2023  3:08 PM EST)    E-Cancel Status: Request denied by pharmacy (4/16/2024 10:04 PM EDT)       E-Cancel Status Note: Some or all of Rx dispensed; Last fill:03/11/24      Order Providers    Prescribing Provider Encounter Provider   Chris Celis MD Ahmad, Rayeesa, MD       Associated Diagnoses    Type 2 diabetes mellitus with diabetic polyneuropathy, with long-term current use of insulin (HCC)        Encounter    View Encounter

## 2024-04-17 NOTE — PATIENT INSTRUCTIONS
Dear valued patient,    We hope this message finds you in good health. At [ ], we are committed to providing you with the best possible healthcare experience. To further enhance your convenience and streamline your access to our services, we would like to introduce you to the benefits of utilizing direct scheduling through the [employer] Patient Portal.    Direct scheduling is a feature within the [employer] Patient Portal that allows you to schedule appointments with your healthcare provider directly, without the need to make a phone call or wait for a callback. We understand that your time is reagan, and we want to ensure that you have quick and easy access to our services whenever you need them.  Here are some reasons why you should consider utilizing direct scheduling through the [employer] Patient Portal:    1. Convenience: With direct scheduling, you can book appointments at any time that suits you best, 24 hours a day, 7 days a week. No more waiting on hold or playing phone tag with our office staff. It puts you in control of managing your healthcare appointments effortlessly.    2. Accessibility: The [employer] Patient Portal is accessible through your computer, smartphone, or tablet, allowing you to schedule appointments from the comfort of your home, office, or on the go. You can access your medical records, review test results, and communicate with your healthcare provider all in one secure and user-friendly platform.    3. Timesaving: By utilizing direct scheduling, you can avoid unnecessary delays and save reagan time. You can easily browse the available appointment slots and select the one that works best for you, eliminating the back-and-forth communication typically required when scheduling via phone.    4. Reminders and notifications: [employer] Patient Portal sends automatic reminders for upcoming appointments, ensuring that you never miss an important visit. You can also receive notifications about test

## 2024-04-17 NOTE — TELEPHONE ENCOUNTER
Pt states she doesn't take the Levemir and will discuss at appointment today. Pt states she found a pharmacy that her out of pocket cost will be cheaper.

## 2024-04-17 NOTE — PROGRESS NOTES
Chief Complaint   Patient presents with    Diabetes    Insomnia         Sarah Garcia  here today for follow up on chronic medical problems, go over labs and/or diagnostic studies, and medication refills. Diabetes and Insomnia      HPI: Patient is scheduled for follow-up on chronic medical conditions, not seen since more than 6 months.  Patient has missed her appointments and is noncompliant with her medications.      Type 2 diabetes uncontrolled A1c is increased to 11, patient was on Humulin R, reports insurance stopped covering that.  Currently she is not take any medications, she was also on glimepiride Jardiance and Januvia.  Patient reports insurance has stopped all of these medications she does not monitor her blood sugars.    As per pharmacy notes, insurance can cover Tresiba, Basaglar discussed with patient we can change insulin, she had prescribed but she never picked up from the pharmacy.      Hypertension controlled, patient was on Lasix due to peripheral edema, reports she has stopped taking that.  Patient is not currently on any medications and blood pressure was running normal.  She is off of medications.  Patient has not done blood work which was ordered on previous appointment labs reprinted.      Hyperlipidemia was on dual therapy due to high triglycerides, patient was on fenofibrate as well as statins.  Patient reports she does not sleep and drinks pop throughout the night.  Patient reports she has problems with anxiety and depression she is not able to come out from the home.  Patient reports she does not sleep she was taking Remeron or trazodone in the past which was helping.      Patient reports she gets anxious when she goes for the appointments, she is currently on Celexa reports that is helping.      Patient has diabetic neuropathy and is on gabapentin.  She denies any illicit drug use.      Patient was also taking potassium supplements being on diuretics, has stopped taking diuretics as

## 2024-04-22 ENCOUNTER — OFFICE VISIT (OUTPATIENT)
Dept: FAMILY MEDICINE CLINIC | Age: 39
End: 2024-04-22
Payer: COMMERCIAL

## 2024-04-22 VITALS
TEMPERATURE: 97.5 F | BODY MASS INDEX: 36.86 KG/M2 | OXYGEN SATURATION: 99 % | HEIGHT: 63 IN | HEART RATE: 92 BPM | WEIGHT: 208 LBS | SYSTOLIC BLOOD PRESSURE: 120 MMHG | DIASTOLIC BLOOD PRESSURE: 70 MMHG

## 2024-04-22 DIAGNOSIS — H10.32 ACUTE BACTERIAL CONJUNCTIVITIS OF LEFT EYE: Primary | ICD-10-CM

## 2024-04-22 DIAGNOSIS — E11.42 TYPE 2 DIABETES MELLITUS WITH DIABETIC POLYNEUROPATHY, WITH LONG-TERM CURRENT USE OF INSULIN (HCC): ICD-10-CM

## 2024-04-22 DIAGNOSIS — H57.12 LEFT EYE PAIN: ICD-10-CM

## 2024-04-22 DIAGNOSIS — Z79.4 TYPE 2 DIABETES MELLITUS WITH DIABETIC POLYNEUROPATHY, WITH LONG-TERM CURRENT USE OF INSULIN (HCC): ICD-10-CM

## 2024-04-22 PROCEDURE — 99214 OFFICE O/P EST MOD 30 MIN: CPT | Performed by: FAMILY MEDICINE

## 2024-04-22 PROCEDURE — 3074F SYST BP LT 130 MM HG: CPT | Performed by: FAMILY MEDICINE

## 2024-04-22 PROCEDURE — 2022F DILAT RTA XM EVC RTNOPTHY: CPT | Performed by: FAMILY MEDICINE

## 2024-04-22 PROCEDURE — 4004F PT TOBACCO SCREEN RCVD TLK: CPT | Performed by: FAMILY MEDICINE

## 2024-04-22 PROCEDURE — 3078F DIAST BP <80 MM HG: CPT | Performed by: FAMILY MEDICINE

## 2024-04-22 PROCEDURE — G8427 DOCREV CUR MEDS BY ELIG CLIN: HCPCS | Performed by: FAMILY MEDICINE

## 2024-04-22 PROCEDURE — 3046F HEMOGLOBIN A1C LEVEL >9.0%: CPT | Performed by: FAMILY MEDICINE

## 2024-04-22 PROCEDURE — G8417 CALC BMI ABV UP PARAM F/U: HCPCS | Performed by: FAMILY MEDICINE

## 2024-04-22 RX ORDER — AZELASTINE HYDROCHLORIDE 0.5 MG/ML
1 SOLUTION/ DROPS OPHTHALMIC 2 TIMES DAILY
Qty: 1 EACH | Refills: 0 | Status: SHIPPED | OUTPATIENT
Start: 2024-04-22 | End: 2024-05-22

## 2024-04-22 RX ORDER — CIPROFLOXACIN HYDROCHLORIDE 3.5 MG/ML
1 SOLUTION/ DROPS TOPICAL
Qty: 1 EACH | Refills: 0 | Status: SHIPPED | OUTPATIENT
Start: 2024-04-22 | End: 2024-05-02

## 2024-04-22 SDOH — ECONOMIC STABILITY: FOOD INSECURITY: WITHIN THE PAST 12 MONTHS, YOU WORRIED THAT YOUR FOOD WOULD RUN OUT BEFORE YOU GOT MONEY TO BUY MORE.: NEVER TRUE

## 2024-04-22 SDOH — ECONOMIC STABILITY: FOOD INSECURITY: WITHIN THE PAST 12 MONTHS, THE FOOD YOU BOUGHT JUST DIDN'T LAST AND YOU DIDN'T HAVE MONEY TO GET MORE.: NEVER TRUE

## 2024-04-22 SDOH — ECONOMIC STABILITY: INCOME INSECURITY: HOW HARD IS IT FOR YOU TO PAY FOR THE VERY BASICS LIKE FOOD, HOUSING, MEDICAL CARE, AND HEATING?: NOT HARD AT ALL

## 2024-04-22 ASSESSMENT — ENCOUNTER SYMPTOMS
BLOOD IN STOOL: 0
SORE THROAT: 0
PHOTOPHOBIA: 1
BACK PAIN: 0
ABDOMINAL PAIN: 0
WHEEZING: 0
SINUS PRESSURE: 0
RHINORRHEA: 0
DIARRHEA: 0
COLOR CHANGE: 0
EYE DISCHARGE: 1
NAUSEA: 0
EYE REDNESS: 0
STRIDOR: 0
CONSTIPATION: 0
CHEST TIGHTNESS: 0
VOMITING: 0
RECTAL PAIN: 0
COUGH: 0
TROUBLE SWALLOWING: 0
EYE ITCHING: 1
ABDOMINAL DISTENTION: 0
SHORTNESS OF BREATH: 0

## 2024-04-22 ASSESSMENT — VISUAL ACUITY: OU: 1

## 2024-04-22 NOTE — PROGRESS NOTES
Visit Information    Have you changed or started any medications since your last visit including any over-the-counter medicines, vitamins, or herbal medicines? no   Are you having any side effects from any of your medications? -  no  Have you stopped taking any of your medications? Is so, why? -  no    Have you seen any other physician or provider since your last visit? No  Have you had any other diagnostic tests since your last visit? No  Have you been seen in the emergency room and/or had an admission to a hospital since we last saw you? No  Have you had your routine dental cleaning in the past 6 months? yes    Have you activated your [employer] account? If not, what are your barriers? Yes     Patient Care Team:  Chris Celis MD as PCP - General (Family Medicine)  Chris Celis MD as PCP - Empaneled Provider    Medical History Review  Past Medical, Family, and Social History reviewed and does contribute to the patient presenting condition    Health Maintenance   Topic Date Due    Hepatitis B vaccine (1 of 3 - 3-dose series) Never done    Pneumococcal 0-64 years Vaccine (1 of 2 - PCV) Never done    DTaP/Tdap/Td vaccine (1 - Tdap) Never done    Diabetic retinal exam  06/10/2016    Cervical cancer screen  06/16/2018    COVID-19 Vaccine (3 - 2023-24 season) 09/01/2023    Diabetic Alb to Cr ratio (uACR) test  02/20/2024    Lipids  02/20/2024    GFR test (Diabetes, CKD 3-4, OR last GFR 15-59)  02/20/2024    A1C test (Diabetic or Prediabetic)  07/17/2024    Flu vaccine (Season Ended) 08/01/2024    Diabetic foot exam  10/27/2024    Depression Monitoring  04/15/2025    Hepatitis C screen  Completed    HIV screen  Completed    Hepatitis A vaccine  Aged Out    Hib vaccine  Aged Out    HPV vaccine  Aged Out    Polio vaccine  Aged Out    Meningococcal (ACWY) vaccine  Aged Out    Varicella vaccine  Discontinued

## 2024-04-22 NOTE — PATIENT INSTRUCTIONS
Dear valued patient,    We hope this message finds you in good health. At [ ], we are committed to providing you with the best possible healthcare experience. To further enhance your convenience and streamline your access to our services, we would like to introduce you to the benefits of utilizing direct scheduling through the [employer] Patient Portal.    Direct scheduling is a feature within the [employer] Patient Portal that allows you to schedule appointments with your healthcare provider directly, without the need to make a phone call or wait for a callback. We understand that your time is reagan, and we want to ensure that you have quick and easy access to our services whenever you need them.  Here are some reasons why you should consider utilizing direct scheduling through the [employer] Patient Portal:    1. Convenience: With direct scheduling, you can book appointments at any time that suits you best, 24 hours a day, 7 days a week. No more waiting on hold or playing phone tag with our office staff. It puts you in control of managing your healthcare appointments effortlessly.    2. Accessibility: The [employer] Patient Portal is accessible through your computer, smartphone, or tablet, allowing you to schedule appointments from the comfort of your home, office, or on the go. You can access your medical records, review test results, and communicate with your healthcare provider all in one secure and user-friendly platform.    3. Timesaving: By utilizing direct scheduling, you can avoid unnecessary delays and save reagan time. You can easily browse the available appointment slots and select the one that works best for you, eliminating the back-and-forth communication typically required when scheduling via phone.    4. Reminders and notifications: [employer] Patient Portal sends automatic reminders for upcoming appointments, ensuring that you never miss an important visit. You can also receive notifications about test

## 2024-04-22 NOTE — PROGRESS NOTES
Chief Complaint   Patient presents with    Eye Problem     Left eye swelling and irritation started today          Sarah Garcia  here today for follow up on chronic medical problems, go over labs and/or diagnostic studies, and medication refills. Eye Problem (Left eye swelling and irritation started today )      HPI: Patient is scheduled for sick call complaining of left eye redness and pain.  Patient reports it started this morning.  Patient reports she has noticed the irritation on the left eye, redness and watering.  She has photophobia, reports she has decreased vision in both eyes due to dye.  Neuropathy.  She has not seen ophthalmologist since last 1 year.  Patient has been noncompliant due to insurance issues.  Patient denies any trauma, reports she has irritation of the left eye and photophobia and also complains of mild pain.  Patient did miss work today.      Type 2 diabetes, was recently started on insulin reports she has not received that yet.  Patient reports she needs prior authorization for insulin.          /70   Pulse 92   Temp 97.5 °F (36.4 °C)   Ht 1.6 m (5' 2.99\")   Wt 94.3 kg (208 lb)   LMP 04/03/2024 (Approximate)   SpO2 99%   BMI 36.85 kg/m²    Body mass index is 36.85 kg/m².  Wt Readings from Last 3 Encounters:   04/22/24 94.3 kg (208 lb)   04/17/24 93.9 kg (207 lb)   10/27/23 102.2 kg (225 lb 3.2 oz)        []Negative depression screening.      4/15/2024     6:19 PM 10/27/2023     1:14 PM 2/21/2023     9:05 AM 2/21/2022     2:54 PM 12/28/2021    10:05 AM 9/21/2021     2:36 PM 6/21/2021    11:24 AM   PHQ Scores   PHQ2 Score 2 0 0 3 0 0 2   PHQ9 Score 12 7 0 9 0 0 2      []1-4 = Minimal depression   []5-9 = Milddepression   [x]10-14 = Moderate depression   []15-19 = Moderately severe depression   []20-27 = Severe depression    Discussed testing with the patient and all questions fully answered.    Office Visit on 04/17/2024   Component Date Value Ref Range Status    Hemoglobin

## 2024-05-16 DIAGNOSIS — E11.42 TYPE 2 DIABETES MELLITUS WITH DIABETIC POLYNEUROPATHY, WITH LONG-TERM CURRENT USE OF INSULIN (HCC): ICD-10-CM

## 2024-05-16 DIAGNOSIS — Z79.4 TYPE 2 DIABETES MELLITUS WITH DIABETIC POLYNEUROPATHY, WITH LONG-TERM CURRENT USE OF INSULIN (HCC): ICD-10-CM

## 2024-05-16 RX ORDER — GABAPENTIN 300 MG/1
300 CAPSULE ORAL 3 TIMES DAILY
Qty: 90 CAPSULE | Refills: 0 | Status: SHIPPED | OUTPATIENT
Start: 2024-05-16 | End: 2024-06-15

## 2024-05-18 DIAGNOSIS — F33.41 RECURRENT MAJOR DEPRESSIVE DISORDER, IN PARTIAL REMISSION (HCC): ICD-10-CM

## 2024-05-20 RX ORDER — HYDROXYZINE PAMOATE 25 MG/1
CAPSULE ORAL
Qty: 90 CAPSULE | Refills: 1 | Status: SHIPPED | OUTPATIENT
Start: 2024-05-20

## 2024-05-20 NOTE — TELEPHONE ENCOUNTER
Please Approve or Refuse.  Send to Pharmacy per Pt's Request:      Next Visit Date:  7/19/2024   Last Visit Date: 4/22/2024    Hemoglobin A1C (%)   Date Value   04/17/2024 11.4   10/27/2023 10.1   05/23/2023 10.3             ( goal A1C is < 7)   BP Readings from Last 3 Encounters:   04/22/24 120/70   04/17/24 110/60   10/27/23 118/76          (goal 120/80)  BUN   Date Value Ref Range Status   02/20/2023 6 6 - 20 mg/dL Final     Creatinine   Date Value Ref Range Status   02/20/2023 0.54 0.50 - 0.90 mg/dL Final     Potassium   Date Value Ref Range Status   02/20/2023 3.6 (L) 3.7 - 5.3 mmol/L Final

## 2024-06-14 DIAGNOSIS — Z79.4 TYPE 2 DIABETES MELLITUS WITH DIABETIC POLYNEUROPATHY, WITH LONG-TERM CURRENT USE OF INSULIN (HCC): ICD-10-CM

## 2024-06-14 DIAGNOSIS — E11.42 TYPE 2 DIABETES MELLITUS WITH DIABETIC POLYNEUROPATHY, WITH LONG-TERM CURRENT USE OF INSULIN (HCC): ICD-10-CM

## 2024-06-14 RX ORDER — GABAPENTIN 300 MG/1
300 CAPSULE ORAL 3 TIMES DAILY
Qty: 90 CAPSULE | Refills: 0 | Status: SHIPPED | OUTPATIENT
Start: 2024-06-14 | End: 2024-07-14

## 2024-06-14 NOTE — TELEPHONE ENCOUNTER
Please Approve or Refuse.  Send to Pharmacy per Pt's Request: [facility]     Next Visit Date:  7/19/2024   Last Visit Date: 4/22/2024    Hemoglobin A1C (%)   Date Value   04/17/2024 11.4   10/27/2023 10.1   05/23/2023 10.3             ( goal A1C is < 7)   BP Readings from Last 3 Encounters:   04/22/24 120/70   04/17/24 110/60   10/27/23 118/76          (goal 120/80)  BUN   Date Value Ref Range Status   02/20/2023 6 6 - 20 mg/dL Final     Creatinine   Date Value Ref Range Status   02/20/2023 0.54 0.50 - 0.90 mg/dL Final     Potassium   Date Value Ref Range Status   02/20/2023 3.6 (L) 3.7 - 5.3 mmol/L Final

## 2024-06-18 DIAGNOSIS — Z79.4 TYPE 2 DIABETES MELLITUS WITH DIABETIC POLYNEUROPATHY, WITH LONG-TERM CURRENT USE OF INSULIN (HCC): ICD-10-CM

## 2024-06-18 DIAGNOSIS — E11.42 TYPE 2 DIABETES MELLITUS WITH DIABETIC POLYNEUROPATHY, WITH LONG-TERM CURRENT USE OF INSULIN (HCC): ICD-10-CM

## 2024-06-18 DIAGNOSIS — F33.41 RECURRENT MAJOR DEPRESSIVE DISORDER, IN PARTIAL REMISSION (HCC): ICD-10-CM

## 2024-06-18 RX ORDER — GABAPENTIN 300 MG/1
300 CAPSULE ORAL 3 TIMES DAILY
Qty: 90 CAPSULE | Refills: 0 | OUTPATIENT
Start: 2024-06-18 | End: 2024-07-18

## 2024-06-18 RX ORDER — HYDROXYZINE PAMOATE 25 MG/1
CAPSULE ORAL
Qty: 90 CAPSULE | Refills: 0 | Status: SHIPPED | OUTPATIENT
Start: 2024-06-18

## 2024-06-26 ENCOUNTER — TELEPHONE (OUTPATIENT)
Dept: FAMILY MEDICINE CLINIC | Age: 39
End: 2024-06-26

## 2024-06-26 NOTE — TELEPHONE ENCOUNTER
Is patient taking alogliptin?  Her insurance is questioning her compliance with medications      FYI    his patient is past due for the following medication(s) as of 20240623: ALOGLIPTIN (8 days late). Please evaluate this member's refill history and discuss the importance of medication adherence if appropriate.   [phone]   The chart below details the patient's pharmacy claims used to identify the above drug therapy opportunity. The medication list is current as of 20240623.    Patient Late to Refill Medication  Date Filled Drug Name / Strength Dispensed Quantity Days Supply Pharmacy Name Pharmacy Phone Prescriber of Record Prescriber Phone Number   [phone] ALOGLIPTIN 30 30 [facility] [phone] [facility] [phone]   [id number] ALOGLIPTIN 25 MG TABLET 30 30 [facility] [phone] [facility] [phone]     Confidential/Protected Health Information (PHI) Provider Follow-Up    Privacy Notice: This facsimile message and any attachments are intended for the exclusive use of the addressee(s) and may contain information that is proprietary, confidential and/or exempt from disclosure and may be Protected Health Information. If you are not the intended recipient, please notify us immediately by calling the toll free number below and ask for customer service. If you are a regular recipient of our faxes, please notify us if you change your fax number. Thank you. [employer] is the brand name used for products and services provided by one or more of the wholly owned subsidiaries of [employer], who are Qualified Health Plan issuers in the states indicated at www.[employer]. ©2024 [employer]. www.[employer]. All rights reserved.         Future Appointments   Date Time Provider Department Center   7/19/2024 11:00 AM Chris Celis MD fp [facility]

## 2024-07-03 NOTE — TELEPHONE ENCOUNTER
Noted  Future Appointments   Date Time Provider Department Center   7/19/2024 11:00 AM [facility]

## 2024-07-10 DIAGNOSIS — E55.9 VITAMIN D DEFICIENCY: ICD-10-CM

## 2024-07-11 RX ORDER — ERGOCALCIFEROL 1.25 MG/1
CAPSULE ORAL
Qty: 12 CAPSULE | Refills: 1 | Status: SHIPPED | OUTPATIENT
Start: 2024-07-11

## 2024-07-11 RX ORDER — POTASSIUM CHLORIDE 750 MG/1
10 TABLET, EXTENDED RELEASE ORAL DAILY
Qty: 180 TABLET | Refills: 0 | Status: SHIPPED | OUTPATIENT
Start: 2024-07-11

## 2024-08-09 DIAGNOSIS — F33.41 RECURRENT MAJOR DEPRESSIVE DISORDER, IN PARTIAL REMISSION (HCC): ICD-10-CM

## 2024-08-09 RX ORDER — MIRTAZAPINE 15 MG/1
TABLET, FILM COATED ORAL
Qty: 30 TABLET | Refills: 3 | Status: SHIPPED | OUTPATIENT
Start: 2024-08-09

## 2024-08-09 NOTE — TELEPHONE ENCOUNTER
Please Approve or Refuse.  Send to Pharmacy per Pt's Request:      Next Visit Date:  Visit date not found   Last Visit Date: 4/22/2024    Hemoglobin A1C (%)   Date Value   04/17/2024 11.4   10/27/2023 10.1   05/23/2023 10.3             ( goal A1C is < 7)   BP Readings from Last 3 Encounters:   04/22/24 120/70   04/17/24 110/60   10/27/23 118/76          (goal 120/80)  BUN   Date Value Ref Range Status   02/20/2023 6 6 - 20 mg/dL Final     Creatinine   Date Value Ref Range Status   02/20/2023 0.54 0.50 - 0.90 mg/dL Final     Potassium   Date Value Ref Range Status   02/20/2023 3.6 (L) 3.7 - 5.3 mmol/L Final

## 2024-11-26 ENCOUNTER — TELEPHONE (OUTPATIENT)
Dept: FAMILY MEDICINE CLINIC | Age: 39
End: 2024-11-26

## 2024-11-26 NOTE — TELEPHONE ENCOUNTER
I received a letter from her insurance [employer], is she taking Lipitor 40 Mg daily?  It says in this letter to switch to lovastatin which might be cheaper for her.  Please verify with patient    Please see the letter I placed back in the box

## 2025-04-12 ENCOUNTER — APPOINTMENT (OUTPATIENT)
Dept: CT IMAGING | Age: 40
End: 2025-04-12
Payer: COMMERCIAL

## 2025-04-12 ENCOUNTER — HOSPITAL ENCOUNTER (EMERGENCY)
Age: 40
Discharge: HOME | End: 2025-04-12
Attending: EMERGENCY MEDICINE
Payer: COMMERCIAL

## 2025-04-12 VITALS
WEIGHT: 200 LBS | DIASTOLIC BLOOD PRESSURE: 87 MMHG | HEART RATE: 75 BPM | HEIGHT: 63 IN | BODY MASS INDEX: 35.44 KG/M2 | OXYGEN SATURATION: 97 % | RESPIRATION RATE: 18 BRPM | SYSTOLIC BLOOD PRESSURE: 130 MMHG | TEMPERATURE: 98.1 F

## 2025-04-12 DIAGNOSIS — K52.9 COLITIS: Primary | ICD-10-CM

## 2025-04-12 LAB
ALBUMIN SERPL-MCNC: 4.4 G/DL (ref 3.5–5.2)
ALP SERPL-CCNC: 144 U/L (ref 35–104)
ALT SERPL-CCNC: 51 U/L (ref 10–35)
ANION GAP SERPL CALCULATED.3IONS-SCNC: 17 MMOL/L (ref 9–16)
AST SERPL-CCNC: 50 U/L (ref 10–35)
BASOPHILS # BLD: 0.1 K/UL (ref 0–0.2)
BASOPHILS NFR BLD: 1 % (ref 0–2)
BILIRUB SERPL-MCNC: 0.7 MG/DL (ref 0–1.2)
BUN SERPL-MCNC: 12 MG/DL (ref 6–20)
CALCIUM SERPL-MCNC: 9.4 MG/DL (ref 8.6–10.4)
CHLORIDE SERPL-SCNC: 101 MMOL/L (ref 98–107)
CO2 SERPL-SCNC: 18 MMOL/L (ref 20–31)
CREAT SERPL-MCNC: 0.7 MG/DL (ref 0.7–1.2)
EOSINOPHIL # BLD: 0 K/UL (ref 0–0.4)
EOSINOPHILS RELATIVE PERCENT: 0 % (ref 0–4)
ERYTHROCYTE [DISTWIDTH] IN BLOOD BY AUTOMATED COUNT: 13.5 % (ref 11.5–14.9)
GFR, ESTIMATED: >90 ML/MIN/1.73M2
GLUCOSE BLD-MCNC: 211 MG/DL (ref 65–105)
GLUCOSE SERPL-MCNC: 198 MG/DL (ref 74–99)
HCG SERPL QL: NEGATIVE
HCT VFR BLD AUTO: 41.8 % (ref 36–46)
HGB BLD-MCNC: 14.7 G/DL (ref 12–16)
LIPASE SERPL-CCNC: 22 U/L (ref 13–60)
LYMPHOCYTES NFR BLD: 2.2 K/UL (ref 1–4.8)
LYMPHOCYTES RELATIVE PERCENT: 21 % (ref 24–44)
MCH RBC QN AUTO: 32.8 PG (ref 26–34)
MCHC RBC AUTO-ENTMCNC: 35.2 G/DL (ref 31–37)
MCV RBC AUTO: 93.2 FL (ref 80–100)
MONOCYTES NFR BLD: 0.5 K/UL (ref 0.1–1.3)
MONOCYTES NFR BLD: 5 % (ref 1–7)
NEUTROPHILS NFR BLD: 73 % (ref 36–66)
NEUTS SEG NFR BLD: 7.9 K/UL (ref 1.3–9.1)
PLATELET # BLD AUTO: 249 K/UL (ref 150–450)
PMV BLD AUTO: 9.6 FL (ref 6–12)
POTASSIUM SERPL-SCNC: 3.8 MMOL/L (ref 3.7–5.3)
PROT SERPL-MCNC: 7.9 G/DL (ref 6.6–8.7)
RBC # BLD AUTO: 4.49 M/UL (ref 4–5.2)
SODIUM SERPL-SCNC: 136 MMOL/L (ref 136–145)
WBC OTHER # BLD: 10.7 K/UL (ref 3.5–11)

## 2025-04-12 PROCEDURE — 6360000004 HC RX CONTRAST MEDICATION: Performed by: EMERGENCY MEDICINE

## 2025-04-12 PROCEDURE — 83690 ASSAY OF LIPASE: CPT

## 2025-04-12 PROCEDURE — 6360000002 HC RX W HCPCS: Performed by: EMERGENCY MEDICINE

## 2025-04-12 PROCEDURE — 80053 COMPREHEN METABOLIC PANEL: CPT

## 2025-04-12 PROCEDURE — 99285 EMERGENCY DEPT VISIT HI MDM: CPT

## 2025-04-12 PROCEDURE — 74177 CT ABD & PELVIS W/CONTRAST: CPT

## 2025-04-12 PROCEDURE — 82947 ASSAY GLUCOSE BLOOD QUANT: CPT

## 2025-04-12 PROCEDURE — 96361 HYDRATE IV INFUSION ADD-ON: CPT

## 2025-04-12 PROCEDURE — 96374 THER/PROPH/DIAG INJ IV PUSH: CPT

## 2025-04-12 PROCEDURE — 2500000003 HC RX 250 WO HCPCS: Performed by: EMERGENCY MEDICINE

## 2025-04-12 PROCEDURE — 2580000003 HC RX 258: Performed by: EMERGENCY MEDICINE

## 2025-04-12 PROCEDURE — 84703 CHORIONIC GONADOTROPIN ASSAY: CPT

## 2025-04-12 PROCEDURE — 96375 TX/PRO/DX INJ NEW DRUG ADDON: CPT

## 2025-04-12 PROCEDURE — 85025 COMPLETE CBC W/AUTO DIFF WBC: CPT

## 2025-04-12 PROCEDURE — 6370000000 HC RX 637 (ALT 250 FOR IP): Performed by: EMERGENCY MEDICINE

## 2025-04-12 PROCEDURE — 36415 COLL VENOUS BLD VENIPUNCTURE: CPT

## 2025-04-12 RX ORDER — KETOROLAC TROMETHAMINE 30 MG/ML
30 INJECTION, SOLUTION INTRAMUSCULAR; INTRAVENOUS ONCE
Status: COMPLETED | OUTPATIENT
Start: 2025-04-12 | End: 2025-04-12

## 2025-04-12 RX ORDER — 0.9 % SODIUM CHLORIDE 0.9 %
100 INTRAVENOUS SOLUTION INTRAVENOUS ONCE
Status: COMPLETED | OUTPATIENT
Start: 2025-04-12 | End: 2025-04-12

## 2025-04-12 RX ORDER — IOPAMIDOL 755 MG/ML
75 INJECTION, SOLUTION INTRAVASCULAR
Status: COMPLETED | OUTPATIENT
Start: 2025-04-12 | End: 2025-04-12

## 2025-04-12 RX ORDER — ONDANSETRON 4 MG/1
4 TABLET, FILM COATED ORAL EVERY 8 HOURS PRN
Qty: 20 TABLET | Refills: 0 | Status: SHIPPED | OUTPATIENT
Start: 2025-04-12

## 2025-04-12 RX ORDER — LEVOFLOXACIN 500 MG/1
500 TABLET, FILM COATED ORAL ONCE
Status: COMPLETED | OUTPATIENT
Start: 2025-04-12 | End: 2025-04-12

## 2025-04-12 RX ORDER — 0.9 % SODIUM CHLORIDE 0.9 %
1000 INTRAVENOUS SOLUTION INTRAVENOUS ONCE
Status: COMPLETED | OUTPATIENT
Start: 2025-04-12 | End: 2025-04-12

## 2025-04-12 RX ORDER — SODIUM CHLORIDE 0.9 % (FLUSH) 0.9 %
10 SYRINGE (ML) INJECTION PRN
Status: DISCONTINUED | OUTPATIENT
Start: 2025-04-12 | End: 2025-04-12 | Stop reason: HOSPADM

## 2025-04-12 RX ORDER — ONDANSETRON 2 MG/ML
4 INJECTION INTRAMUSCULAR; INTRAVENOUS ONCE
Status: COMPLETED | OUTPATIENT
Start: 2025-04-12 | End: 2025-04-12

## 2025-04-12 RX ORDER — LEVOFLOXACIN 500 MG/1
500 TABLET, FILM COATED ORAL DAILY
Qty: 5 TABLET | Refills: 0 | Status: SHIPPED | OUTPATIENT
Start: 2025-04-12 | End: 2025-04-17

## 2025-04-12 RX ORDER — KETOROLAC TROMETHAMINE 30 MG/ML
30 INJECTION, SOLUTION INTRAMUSCULAR; INTRAVENOUS ONCE
Status: DISCONTINUED | OUTPATIENT
Start: 2025-04-12 | End: 2025-04-12

## 2025-04-12 RX ADMIN — ONDANSETRON 4 MG: 2 INJECTION, SOLUTION INTRAMUSCULAR; INTRAVENOUS at 15:39

## 2025-04-12 RX ADMIN — SODIUM CHLORIDE, PRESERVATIVE FREE 10 ML: 5 INJECTION INTRAVENOUS at 16:37

## 2025-04-12 RX ADMIN — LEVOFLOXACIN 500 MG: 500 TABLET, FILM COATED ORAL at 18:32

## 2025-04-12 RX ADMIN — SODIUM CHLORIDE 100 ML: 9 INJECTION, SOLUTION INTRAVENOUS at 16:38

## 2025-04-12 RX ADMIN — SODIUM CHLORIDE 1000 ML: 0.9 INJECTION, SOLUTION INTRAVENOUS at 15:38

## 2025-04-12 RX ADMIN — IOPAMIDOL 75 ML: 755 INJECTION, SOLUTION INTRAVENOUS at 16:37

## 2025-04-12 RX ADMIN — KETOROLAC TROMETHAMINE 30 MG: 30 INJECTION, SOLUTION INTRAMUSCULAR at 17:25

## 2025-04-12 ASSESSMENT — PAIN DESCRIPTION - ORIENTATION: ORIENTATION: MID

## 2025-04-12 ASSESSMENT — LIFESTYLE VARIABLES
HOW MANY STANDARD DRINKS CONTAINING ALCOHOL DO YOU HAVE ON A TYPICAL DAY: PATIENT DOES NOT DRINK
HOW OFTEN DO YOU HAVE A DRINK CONTAINING ALCOHOL: NEVER

## 2025-04-12 ASSESSMENT — PAIN DESCRIPTION - LOCATION: LOCATION: ABDOMEN

## 2025-04-12 ASSESSMENT — PAIN SCALES - GENERAL: PAINLEVEL_OUTOF10: 7

## 2025-04-12 NOTE — ED PROVIDER NOTES
[facility] EMERGENCY DEPARTMENT  EMERGENCY DEPARTMENT ENCOUNTER      Pt Name: Sarah Garcia  MRN: [id number]  Birthdate 1985  Date of evaluation: 4/12/25      CHIEF COMPLAINT       Chief Complaint   Patient presents with    Vomiting         HISTORY OF PRESENT ILLNESS   HPI 40 y.o. female presents with c/o abdominal pain.  Symptoms started last Friday (4/5/25).  Then she developed nausea and vomtiing.  Since yesterday, she has not been able to eat or drink without vomiting.  Pain is epigastiric to umbilical in location.  No blood in stool.  She has had constipation.  No blood in stool.  .     REVIEW OF SYSTEMS       Review of Systems  10 systems reviewed and negative unless otherwise noted in the HPI.     PAST MEDICAL HISTORY     Past Medical History:   Diagnosis Date    Anxiety     Bipolar disorder (HCC)     disorder #2    Dental decay     Depression     History of heroin abuse (HCC)     previous IV heroin abuse; clean since 10/26/2012    Hyperlipidemia     Hypertension     Neuropathy     Obesity     Type 2 diabetes mellitus without complication ([facility])     Type II or unspecified type diabetes mellitus without mention of complication, not stated as uncontrolled        SURGICAL HISTORY       Past Surgical History:   Procedure Laterality Date    OVARY REMOVAL Right     TONSILLECTOMY      TUBAL LIGATION         CURRENT MEDICATIONS       Discharge Medication List as of 4/12/2025  6:26 PM        CONTINUE these medications which have NOT CHANGED    Details   mirtazapine (REMERON) 15 MG tablet TAKE ONE (1) TABLET BY MOUTH NIGHTLY, Disp-30 tablet, R-3Normal      vitamin D (ERGOCALCIFEROL) 1.25 MG (06131 UT) CAPS capsule TAKE ONE (1) CAPSULE BY MOUTH ONCE A WEEK, Disp-12 capsule, R-1Normal      potassium chloride (KLOR-CON M) 10 MEQ extended release tablet TAKE 1 TABLET BY MOUTH ONCE DAILY, Disp-180 tablet, R-0Normal      hydrOXYzine pamoate (VISTARIL) 25 MG capsule TAKE ONE (1) CAPSULE BY MOUTH THREE (3) TIMES DAILY  components within normal limits   LIPASE   HCG, SERUM, QUALITATIVE       Vitals Reviewed:    Vitals:    04/12/25 1433 04/12/25 1515 04/12/25 1530   BP: (!) 171/66 128/84 130/87   Pulse: 75     Resp: 18     Temp: 98.1 °F (36.7 °C)     TempSrc: Oral     SpO2: 100% 97%    Weight: 90.7 kg (200 lb)     Height: 1.6 m (5' 3\")       MEDICATIONS GIVEN TO PATIENT THIS ENCOUNTER:  Orders Placed This Encounter   Medications    DISCONTD: ketorolac (TORADOL) injection 30 mg    ondansetron (ZOFRAN) injection 4 mg    sodium chloride 0.9 % bolus 1,000 mL    sodium chloride 0.9 % bolus 100 mL    iopamidol (ISOVUE-370) 76 % injection 75 mL    DISCONTD: sodium chloride flush 0.9 % injection 10 mL    ketorolac (TORADOL) injection 30 mg    levoFLOXacin (LEVAQUIN) tablet 500 mg     Antimicrobial Indications:   Intra-Abdominal Infection    levoFLOXacin (LEVAQUIN) 500 MG tablet     Sig: Take 1 tablet by mouth daily for 5 days     Dispense:  5 tablet     Refill:  0    ondansetron (ZOFRAN) 4 MG tablet     Sig: Take 1 tablet by mouth every 8 hours as needed for Nausea     Dispense:  20 tablet     Refill:  0     DISCHARGE PRESCRIPTIONS:  Discharge Medication List as of 4/12/2025  6:26 PM        START taking these medications    Details   levoFLOXacin (LEVAQUIN) 500 MG tablet Take 1 tablet by mouth daily for 5 days, Disp-5 tablet, R-0Normal      ondansetron (ZOFRAN) 4 MG tablet Take 1 tablet by mouth every 8 hours as needed for Nausea, Disp-20 tablet, R-0Normal           PHYSICIAN CONSULTS ORDERED THIS ENCOUNTER:  None     FINAL IMPRESSION      1. Colitis          DISPOSITION/PLAN   DISPOSITION Decision To Discharge 04/12/2025 06:19:00 PM   DISPOSITION CONDITION Stable           PATIENT REFERRED TO:  Chris Celis MD  [address]  [place] [address] [phone]    In 3 days      [facility] Emergency Department  [address] [address] [phone]    If symptoms worsen      DISCHARGE

## 2025-04-12 NOTE — ED TRIAGE NOTES
Mode of arrival (squad #, walk in, police, etc) : walk in        Chief complaint(s): abd pain, vomiting        Arrival Note (brief scenario, treatment PTA, etc).: Pt states on and off abd pain with vomiting for a couple of weeks but today has been the worst of it. Pt is diabetic and has not checked her sugar in awhile due to a broken meter. Pt reports taking famotidine for her stomach. Pt is A&OX4.        C= \"Have you ever felt that you should Cut down on your drinking?\"  No  A= \"Have people Annoyed you by criticizing your drinking?\"  No  G= \"Have you ever felt bad or Guilty about your drinking?\"  No  E= \"Have you ever had a drink as an Eye-opener first thing in the morning to steady your nerves or to help a hangover?\"  No      Deferred []      Reason for deferring: N/A    *If yes to two or more: probable alcohol abuse.*

## 2025-04-14 ENCOUNTER — TELEPHONE (OUTPATIENT)
Dept: FAMILY MEDICINE CLINIC | Age: 40
End: 2025-04-14

## 2025-04-14 NOTE — TELEPHONE ENCOUNTER
[facility] ED Follow up Call    Reason for ED visit:        4/12/2025 (4 hours)  [facility] Emergency Department     Rafael Castaneda MD  Last attending  Treatment team Colitis  Clinical impression Vomiting  Chief complaint       Hi Sarah ,     This is Lois Tran from Chris Cohen's office, just calling to see how you are doing after your recent ED visit.       FU appts/Provider:      No future appointments.    VOICEMAIL DOCUMENTATION - ERASE IF NOT USED  Hi, this message is for  Sarah    This is Lois Tran from Chris [facility] office. Just calling to see how you are doing after your recent visit to the Emergency Room. Chris [facility] wants to make sure you were able to fill any prescriptions and that you understand your discharge instructions. Please return our call if you need to make a follow up appointment with your provider or have any further needs.   Our phone number is [phone].     Have a great day!

## 2025-04-15 ENCOUNTER — TELEPHONE (OUTPATIENT)
Dept: FAMILY MEDICINE CLINIC | Age: 40
End: 2025-04-15

## 2025-04-15 NOTE — TELEPHONE ENCOUNTER
[facility] ED Follow up Call    Reason for ED visit:  COLITIS             FU appts/Provider:    No future appointments.    VOICEMAIL DOCUMENTATION - ERASE IF NOT USED  Hi, this message is for  ELIZABETH  This is CESAR from Chris [facility] office. Just calling to see how you are doing after your recent visit to the Emergency Room. Chris [facility] wants to make sure you were able to fill any prescriptions and that you understand your discharge instructions. Please return our call if you need to make a follow up appointment with your provider or have any further needs.   Our phone number is [phone]*. Have a great day.

## 2025-04-16 ENCOUNTER — TELEPHONE (OUTPATIENT)
Dept: FAMILY MEDICINE CLINIC | Age: 40
End: 2025-04-16

## 2025-04-16 NOTE — TELEPHONE ENCOUNTER
[facility] ED Follow up Call    Reason for ED visit:       4/12/2025 (4 hours)  [facility] Emergency Department     Rafael Castaneda MD  Last attending  Treatment team Colitis  Clinical impression Vomiting  Chief complaint       Hi Sarah ,     This is Lois Tran from Chris Cohen's office, just calling to see how you are doing after your recent ED visit.    FU appts/Provider:      No future appointments.    VOICEMAIL DOCUMENTATION - ERASE IF NOT USED  Hi, this message is for  Sarah    This is Lois Tran from [facility] office. Just calling to see how you are doing after your recent visit to the Emergency Room. Chris [facility] wants to make sure you were able to fill any prescriptions and that you understand your discharge instructions. Please return our call if you need to make a follow up appointment with your provider or have any further needs.   Our phone number is [phone].     Have a great day!

## 2025-06-03 SDOH — ECONOMIC STABILITY: INCOME INSECURITY: IN THE LAST 12 MONTHS, WAS THERE A TIME WHEN YOU WERE NOT ABLE TO PAY THE MORTGAGE OR RENT ON TIME?: NO

## 2025-06-03 SDOH — ECONOMIC STABILITY: FOOD INSECURITY: WITHIN THE PAST 12 MONTHS, THE FOOD YOU BOUGHT JUST DIDN'T LAST AND YOU DIDN'T HAVE MONEY TO GET MORE.: NEVER TRUE

## 2025-06-03 SDOH — ECONOMIC STABILITY: FOOD INSECURITY: WITHIN THE PAST 12 MONTHS, YOU WORRIED THAT YOUR FOOD WOULD RUN OUT BEFORE YOU GOT MONEY TO BUY MORE.: NEVER TRUE

## 2025-06-03 ASSESSMENT — PATIENT HEALTH QUESTIONNAIRE - PHQ9
1. LITTLE INTEREST OR PLEASURE IN DOING THINGS: NOT AT ALL
SUM OF ALL RESPONSES TO PHQ QUESTIONS 1-9: 2
SUM OF ALL RESPONSES TO PHQ QUESTIONS 1-9: 2
6. FEELING BAD ABOUT YOURSELF - OR THAT YOU ARE A FAILURE OR HAVE LET YOURSELF OR YOUR FAMILY DOWN: NOT AT ALL
6. FEELING BAD ABOUT YOURSELF - OR THAT YOU ARE A FAILURE OR HAVE LET YOURSELF OR YOUR FAMILY DOWN: NOT AT ALL
8. MOVING OR SPEAKING SO SLOWLY THAT OTHER PEOPLE COULD HAVE NOTICED. OR THE OPPOSITE - BEING SO FIDGETY OR RESTLESS THAT YOU HAVE BEEN MOVING AROUND A LOT MORE THAN USUAL: NOT AT ALL
SUM OF ALL RESPONSES TO PHQ QUESTIONS 1-9: 2
10. IF YOU CHECKED OFF ANY PROBLEMS, HOW DIFFICULT HAVE THESE PROBLEMS MADE IT FOR YOU TO DO YOUR WORK, TAKE CARE OF THINGS AT HOME, OR GET ALONG WITH OTHER PEOPLE: NOT DIFFICULT AT ALL
2. FEELING DOWN, DEPRESSED OR HOPELESS: NOT AT ALL
2. FEELING DOWN, DEPRESSED OR HOPELESS: NOT AT ALL
5. POOR APPETITE OR OVEREATING: NOT AT ALL
5. POOR APPETITE OR OVEREATING: NOT AT ALL
10. IF YOU CHECKED OFF ANY PROBLEMS, HOW DIFFICULT HAVE THESE PROBLEMS MADE IT FOR YOU TO DO YOUR WORK, TAKE CARE OF THINGS AT HOME, OR GET ALONG WITH OTHER PEOPLE: NOT DIFFICULT AT ALL
4. FEELING TIRED OR HAVING LITTLE ENERGY: SEVERAL DAYS
SUM OF ALL RESPONSES TO PHQ QUESTIONS 1-9: 2
SUM OF ALL RESPONSES TO PHQ QUESTIONS 1-9: 2
3. TROUBLE FALLING OR STAYING ASLEEP: SEVERAL DAYS
9. THOUGHTS THAT YOU WOULD BE BETTER OFF DEAD, OR OF HURTING YOURSELF: NOT AT ALL
1. LITTLE INTEREST OR PLEASURE IN DOING THINGS: NOT AT ALL
3. TROUBLE FALLING OR STAYING ASLEEP: SEVERAL DAYS
7. TROUBLE CONCENTRATING ON THINGS, SUCH AS READING THE NEWSPAPER OR WATCHING TELEVISION: NOT AT ALL
8. MOVING OR SPEAKING SO SLOWLY THAT OTHER PEOPLE COULD HAVE NOTICED. OR THE OPPOSITE, BEING SO FIGETY OR RESTLESS THAT YOU HAVE BEEN MOVING AROUND A LOT MORE THAN USUAL: NOT AT ALL
9. THOUGHTS THAT YOU WOULD BE BETTER OFF DEAD, OR OF HURTING YOURSELF: NOT AT ALL
7. TROUBLE CONCENTRATING ON THINGS, SUCH AS READING THE NEWSPAPER OR WATCHING TELEVISION: NOT AT ALL
4. FEELING TIRED OR HAVING LITTLE ENERGY: SEVERAL DAYS

## 2025-06-04 ENCOUNTER — OFFICE VISIT (OUTPATIENT)
Dept: FAMILY MEDICINE CLINIC | Age: 40
End: 2025-06-04
Payer: COMMERCIAL

## 2025-06-04 VITALS
OXYGEN SATURATION: 98 % | WEIGHT: 219 LBS | HEIGHT: 63 IN | HEART RATE: 82 BPM | SYSTOLIC BLOOD PRESSURE: 130 MMHG | DIASTOLIC BLOOD PRESSURE: 80 MMHG | BODY MASS INDEX: 38.8 KG/M2

## 2025-06-04 DIAGNOSIS — E11.42 DIABETIC POLYNEUROPATHY ASSOCIATED WITH TYPE 2 DIABETES MELLITUS (HCC): Primary | ICD-10-CM

## 2025-06-04 DIAGNOSIS — F41.9 ANXIETY: ICD-10-CM

## 2025-06-04 DIAGNOSIS — F51.02 ADJUSTMENT INSOMNIA: ICD-10-CM

## 2025-06-04 DIAGNOSIS — Z87.891 PERSONAL HISTORY OF TOBACCO USE: ICD-10-CM

## 2025-06-04 DIAGNOSIS — E66.812 CLASS 2 SEVERE OBESITY DUE TO EXCESS CALORIES WITH SERIOUS COMORBIDITY AND BODY MASS INDEX (BMI) OF 38.0 TO 38.9 IN ADULT (HCC): ICD-10-CM

## 2025-06-04 DIAGNOSIS — Z79.4 TYPE 2 DIABETES MELLITUS WITH DIABETIC POLYNEUROPATHY, WITH LONG-TERM CURRENT USE OF INSULIN (HCC): ICD-10-CM

## 2025-06-04 DIAGNOSIS — E11.42 TYPE 2 DIABETES MELLITUS WITH DIABETIC POLYNEUROPATHY, WITH LONG-TERM CURRENT USE OF INSULIN (HCC): ICD-10-CM

## 2025-06-04 DIAGNOSIS — E55.9 VITAMIN D DEFICIENCY: ICD-10-CM

## 2025-06-04 DIAGNOSIS — F33.41 RECURRENT MAJOR DEPRESSIVE DISORDER, IN PARTIAL REMISSION: ICD-10-CM

## 2025-06-04 DIAGNOSIS — E78.2 MIXED HYPERLIPIDEMIA: ICD-10-CM

## 2025-06-04 DIAGNOSIS — E66.01 CLASS 2 SEVERE OBESITY DUE TO EXCESS CALORIES WITH SERIOUS COMORBIDITY AND BODY MASS INDEX (BMI) OF 38.0 TO 38.9 IN ADULT (HCC): ICD-10-CM

## 2025-06-04 DIAGNOSIS — Z12.31 ENCOUNTER FOR SCREENING MAMMOGRAM FOR HIGH-RISK PATIENT: ICD-10-CM

## 2025-06-04 PROBLEM — M25.472 LEFT ANKLE SWELLING: Status: RESOLVED | Noted: 2020-02-26 | Resolved: 2025-06-04

## 2025-06-04 PROBLEM — E87.6 HYPOKALEMIA: Status: RESOLVED | Noted: 2023-02-21 | Resolved: 2025-06-04

## 2025-06-04 LAB — HBA1C MFR BLD: 7.4 %

## 2025-06-04 PROCEDURE — 3051F HG A1C>EQUAL 7.0%<8.0%: CPT | Performed by: FAMILY MEDICINE

## 2025-06-04 PROCEDURE — 3079F DIAST BP 80-89 MM HG: CPT | Performed by: FAMILY MEDICINE

## 2025-06-04 PROCEDURE — 2022F DILAT RTA XM EVC RTNOPTHY: CPT | Performed by: FAMILY MEDICINE

## 2025-06-04 PROCEDURE — 99214 OFFICE O/P EST MOD 30 MIN: CPT | Performed by: FAMILY MEDICINE

## 2025-06-04 PROCEDURE — 4004F PT TOBACCO SCREEN RCVD TLK: CPT | Performed by: FAMILY MEDICINE

## 2025-06-04 PROCEDURE — G8417 CALC BMI ABV UP PARAM F/U: HCPCS | Performed by: FAMILY MEDICINE

## 2025-06-04 PROCEDURE — 83036 HEMOGLOBIN GLYCOSYLATED A1C: CPT | Performed by: FAMILY MEDICINE

## 2025-06-04 PROCEDURE — 3075F SYST BP GE 130 - 139MM HG: CPT | Performed by: FAMILY MEDICINE

## 2025-06-04 PROCEDURE — G8427 DOCREV CUR MEDS BY ELIG CLIN: HCPCS | Performed by: FAMILY MEDICINE

## 2025-06-04 RX ORDER — CITALOPRAM HYDROBROMIDE 40 MG/1
40 TABLET ORAL DAILY
Qty: 90 TABLET | Refills: 2 | Status: SHIPPED | OUTPATIENT
Start: 2025-06-04

## 2025-06-04 RX ORDER — ATORVASTATIN CALCIUM 40 MG/1
40 TABLET, FILM COATED ORAL DAILY
Qty: 90 TABLET | Refills: 3 | Status: SHIPPED | OUTPATIENT
Start: 2025-06-04

## 2025-06-04 RX ORDER — ASPIRIN 81 MG/1
81 TABLET, COATED ORAL DAILY
Qty: 90 TABLET | Refills: 1 | Status: SHIPPED | OUTPATIENT
Start: 2025-06-04

## 2025-06-04 ASSESSMENT — ENCOUNTER SYMPTOMS
BACK PAIN: 0
COUGH: 0
DIARRHEA: 0
BLOOD IN STOOL: 0
NAUSEA: 0
SINUS PRESSURE: 0
CHEST TIGHTNESS: 0
SORE THROAT: 0
RHINORRHEA: 0
RECTAL PAIN: 0
SHORTNESS OF BREATH: 0
EYE REDNESS: 0
CONSTIPATION: 0
ABDOMINAL DISTENTION: 0
VOMITING: 0
WHEEZING: 0
COLOR CHANGE: 0
ABDOMINAL PAIN: 0
STRIDOR: 0
TROUBLE SWALLOWING: 0

## 2025-06-04 NOTE — PROGRESS NOTES
Visit Information    Have you changed or started any medications since your last visit including any over-the-counter medicines, vitamins, or herbal medicines? no   Are you having any side effects from any of your medications? -  no  Have you stopped taking any of your medications? Is so, why? -  no    Have you seen any other physician or provider since your last visit? No  Have you had any other diagnostic tests since your last visit? No  Have you been seen in the emergency room and/or had an admission to a hospital since we last saw you? No  Have you had your routine dental cleaning in the past 6 months? no    Have you activated your [employer] account? If not, what are your barriers? Yes     Patient Care Team:  Chris Celis MD as PCP - General (Family Medicine)  Chris Celis MD as PCP - Empaneled Provider    Medical History Review  Past Medical, Family, and Social History reviewed and does contribute to the patient presenting condition    Health Maintenance   Topic Date Due    Hepatitis B vaccine (1 of 3 - 19+ 3-dose series) Never done    DTaP/Tdap/Td vaccine (1 - Tdap) Never done    Pneumococcal 0-49 years Vaccine (1 of 2 - PCV) Never done    Diabetic retinal exam  06/10/2016    Cervical cancer screen  06/16/2018    Breast cancer screen  Never done    Diabetic Alb to Cr ratio (uACR) test  02/20/2024    Lipids  02/20/2024    COVID-19 Vaccine (3 - 2024-25 season) 09/01/2024    Diabetic foot exam  10/27/2024    A1C test (Diabetic or Prediabetic)  04/17/2025    Flu vaccine (Season Ended) 08/01/2025    GFR test (Diabetes, CKD 3-4, OR last GFR 15-59)  04/12/2026    Depression Monitoring  06/03/2026    Hepatitis C screen  Completed    HIV screen  Completed    Hepatitis A vaccine  Aged Out    Hib vaccine  Aged Out    HPV vaccine  Aged Out    Polio vaccine  Aged Out    Meningococcal (ACWY) vaccine  Aged Out    Meningococcal B vaccine  Aged Out    Varicella vaccine  Discontinued     
disorder, in partial remission  Recurrent, start on Celexa discontinue Remeron and hydroxyzine.  Patient is doing well on Celexa  - citalopram (CELEXA) 40 MG tablet; Take 1 tablet by mouth daily take 1 tablet by mouth once daily  Dispense: 90 tablet; Refill: 2    5. Adjustment insomnia  Improving, continue current treatment    6. Class 1 obesity due to excess calories with serious comorbidity and body mass index (BMI) of 33.0 to 33.9 in adult  Worsening patient has gained weight since last 1 year    7. Personal history of tobacco use  Patient still smoking not ready to quit    8. Anxiety  Start on Celexa continue current treatment  - citalopram (CELEXA) 40 MG tablet; Take 1 tablet by mouth daily take 1 tablet by mouth once daily  Dispense: 90 tablet; Refill: 2    9. Vitamin D deficiency  Recheck vitamin D levels  - Vitamin D 25 Hydroxy; Future    10. Encounter for screening mammogram for high-risk patient    - [facility] DIGITAL SCREEN W OR WO CAD BILATERAL; Future        Orders Placed This Encounter    [facility] DIGITAL SCREEN W OR WO CAD BILATERAL     Standing Status:   Future     Expected Date:   9/2/2025     Expiration Date:   8/5/2026     Reason for exam::   screening mammogram    Comprehensive Metabolic Panel     Fasting 8 hrs     Standing Status:   Future     Expected Date:   6/4/2025     Expiration Date:   6/4/2026    CBC with Auto Differential     Standing Status:   Future     Expected Date:   6/4/2025     Expiration Date:   6/5/2026    Lipid Panel     Standing Status:   Future     Expected Date:   6/4/2025     Expiration Date:   6/4/2026     Is Patient Fasting?/# of Hours:   10    Magnesium     Standing Status:   Future     Expected Date:   6/4/2025     Expiration Date:   6/4/2026    Albumin/Creatinine Ratio, Urine     Standing Status:   Future     Expected Date:   6/4/2025     Expiration Date:   6/4/2026    TSH     Standing Status:   Future     Expected Date:   9/2/2025     Expiration Date:   6/4/2026    Uric Acid